# Patient Record
Sex: FEMALE | Race: WHITE | Employment: FULL TIME | URBAN - METROPOLITAN AREA
[De-identification: names, ages, dates, MRNs, and addresses within clinical notes are randomized per-mention and may not be internally consistent; named-entity substitution may affect disease eponyms.]

---

## 2017-04-10 ENCOUNTER — HOSPITAL ENCOUNTER (OUTPATIENT)
Dept: OTHER | Age: 32
Discharge: HOME | End: 2017-04-10
Attending: INTERNAL MEDICINE | Admitting: INTERNAL MEDICINE

## 2017-04-10 LAB
A/G RATIO: 0.8 (ref 1.1–2.2)
ALBUMIN SERPL-MCNC: 3.2 G/DL (ref 3.4–5)
ALP BLD-CCNC: 447 U/L (ref 40–129)
ALT SERPL-CCNC: 35 U/L (ref 10–40)
ANION GAP SERPL CALCULATED.3IONS-SCNC: 13 MMOL/L (ref 3–16)
AST SERPL-CCNC: 61 U/L (ref 15–37)
BASOPHILS ABSOLUTE: 0.1 K/UL (ref 0–0.2)
BASOPHILS RELATIVE PERCENT: 0.7 %
BILIRUB SERPL-MCNC: 0.4 MG/DL (ref 0–1)
BUN BLDV-MCNC: 7 MG/DL (ref 7–20)
CALCIUM SERPL-MCNC: 8.4 MG/DL (ref 8.3–10.6)
CHLORIDE BLD-SCNC: 104 MMOL/L (ref 99–110)
CO2: 21 MMOL/L (ref 21–32)
CREAT SERPL-MCNC: <0.5 MG/DL (ref 0.6–1.1)
EOSINOPHILS ABSOLUTE: 0.2 K/UL (ref 0–0.6)
EOSINOPHILS RELATIVE PERCENT: 1.7 %
GFR AFRICAN AMERICAN: >60
GFR NON-AFRICAN AMERICAN: >60
GLOBULIN: 4 G/DL
GLUCOSE BLD-MCNC: 163 MG/DL (ref 70–99)
HCT VFR BLD CALC: 18.6 % (ref 36–48)
HEMOGLOBIN: 5.4 G/DL (ref 12–16)
LYMPHOCYTES ABSOLUTE: 4.7 K/UL (ref 1–5.1)
LYMPHOCYTES RELATIVE PERCENT: 33.5 %
MCH RBC QN AUTO: 20.9 PG (ref 26–34)
MCHC RBC AUTO-ENTMCNC: 28.9 G/DL (ref 31–36)
MCV RBC AUTO: 72.3 FL (ref 80–100)
MONOCYTES ABSOLUTE: 1.2 K/UL (ref 0–1.3)
MONOCYTES RELATIVE PERCENT: 8.8 %
NEUTROPHILS ABSOLUTE: 7.7 K/UL (ref 1.7–7.7)
NEUTROPHILS RELATIVE PERCENT: 55.3 %
PDW BLD-RTO: 19.9 % (ref 12.4–15.4)
PLATELET # BLD: 528 K/UL (ref 135–450)
PMV BLD AUTO: 8.5 FL (ref 5–10.5)
POTASSIUM SERPL-SCNC: 4.1 MMOL/L (ref 3.5–5.1)
RBC # BLD: 2.57 M/UL (ref 4–5.2)
SODIUM BLD-SCNC: 138 MMOL/L (ref 136–145)
T4 TOTAL: 5.8 UG/DL (ref 4.5–10.9)
TOTAL PROTEIN: 7.2 G/DL (ref 6.4–8.2)
TSH SERPL DL<=0.05 MIU/L-ACNC: 1.91 UIU/ML (ref 0.27–4.2)
WBC # BLD: 14 K/UL (ref 4–11)

## 2017-04-11 ENCOUNTER — HOSPITAL ENCOUNTER (OUTPATIENT)
Dept: ONCOLOGY | Age: 32
Discharge: HOME | End: 2017-04-30
Attending: INTERNAL MEDICINE | Admitting: INTERNAL MEDICINE

## 2017-04-11 VITALS
DIASTOLIC BLOOD PRESSURE: 59 MMHG | SYSTOLIC BLOOD PRESSURE: 98 MMHG | HEART RATE: 77 BPM | TEMPERATURE: 98.6 F | RESPIRATION RATE: 15 BRPM

## 2017-04-11 LAB
ABO/RH: NORMAL
ANTIBODY SCREEN: NORMAL
BLOOD BANK DISPENSE STATUS: NORMAL
BLOOD BANK DISPENSE STATUS: NORMAL
BLOOD BANK PRODUCT CODE: NORMAL
BLOOD BANK PRODUCT CODE: NORMAL
BPU ID: NORMAL
BPU ID: NORMAL
DESCRIPTION BLOOD BANK: NORMAL
DESCRIPTION BLOOD BANK: NORMAL
FERRITIN: 12 NG/ML (ref 15–150)
FOLATE: >20 NG/ML (ref 4.78–24.2)
IRON SATURATION: 5 % (ref 15–50)
IRON: 21 UG/DL (ref 37–145)
TOTAL IRON BINDING CAPACITY: 394 UG/DL (ref 260–445)
VITAMIN B-12: 1035 PG/ML (ref 211–911)

## 2017-04-11 RX ORDER — SODIUM CHLORIDE 0.9 % (FLUSH) 0.9 %
10 SYRINGE (ML) INJECTION PRN
Status: DISCONTINUED | OUTPATIENT
Start: 2017-04-11 | End: 2017-04-30 | Stop reason: HOSPADM

## 2017-04-11 RX ORDER — 0.9 % SODIUM CHLORIDE 0.9 %
250 INTRAVENOUS SOLUTION INTRAVENOUS ONCE
Status: DISCONTINUED | OUTPATIENT
Start: 2017-04-11 | End: 2017-04-30 | Stop reason: HOSPADM

## 2017-04-11 RX ORDER — METRONIDAZOLE 250 MG/1
250 TABLET ORAL SEE ADMIN INSTRUCTIONS
COMMUNITY
End: 2017-10-28

## 2017-04-11 RX ORDER — PROMETHAZINE HYDROCHLORIDE 12.5 MG/1
12.5 TABLET ORAL EVERY 6 HOURS PRN
COMMUNITY
End: 2017-10-28

## 2017-04-11 RX ORDER — INSULIN GLARGINE 100 [IU]/ML
5 INJECTION, SOLUTION SUBCUTANEOUS NIGHTLY
Status: ON HOLD | COMMUNITY
End: 2020-05-30 | Stop reason: ALTCHOICE

## 2017-04-11 RX ORDER — ONDANSETRON 4 MG/1
4 TABLET, FILM COATED ORAL EVERY 6 HOURS PRN
COMMUNITY

## 2017-04-11 RX ORDER — ZONISAMIDE 100 MG/1
100 CAPSULE ORAL NIGHTLY
COMMUNITY
End: 2017-11-03

## 2017-04-11 RX ORDER — POLYETHYLENE GLYCOL 3350 17 G/17G
17 POWDER, FOR SOLUTION ORAL NIGHTLY
Status: ON HOLD | COMMUNITY
End: 2017-11-08

## 2017-04-11 RX ORDER — ERGOCALCIFEROL 1.25 MG/1
50000 CAPSULE ORAL WEEKLY
COMMUNITY
End: 2019-11-04

## 2017-04-11 RX ORDER — LAMOTRIGINE 25 MG/1
75 TABLET ORAL 2 TIMES DAILY
COMMUNITY

## 2017-04-11 RX ORDER — METHADONE HYDROCHLORIDE 10 MG/1
10 TABLET ORAL 2 TIMES DAILY
COMMUNITY

## 2017-04-13 ENCOUNTER — HOSPITAL ENCOUNTER (OUTPATIENT)
Dept: OTHER | Age: 32
Discharge: HOME | End: 2017-04-13
Attending: INTERNAL MEDICINE | Admitting: INTERNAL MEDICINE

## 2017-04-13 LAB
BASOPHILS ABSOLUTE: 0.1 K/UL (ref 0–0.2)
BASOPHILS RELATIVE PERCENT: 0.5 %
EOSINOPHILS ABSOLUTE: 0.2 K/UL (ref 0–0.6)
EOSINOPHILS RELATIVE PERCENT: 1.9 %
HCT VFR BLD CALC: 28.4 % (ref 36–48)
HEMOGLOBIN: 8.3 G/DL (ref 12–16)
LYMPHOCYTES ABSOLUTE: 4.6 K/UL (ref 1–5.1)
LYMPHOCYTES RELATIVE PERCENT: 40.7 %
MCH RBC QN AUTO: 22.9 PG (ref 26–34)
MCHC RBC AUTO-ENTMCNC: 29.3 G/DL (ref 31–36)
MCV RBC AUTO: 78.4 FL (ref 80–100)
MONOCYTES ABSOLUTE: 1.2 K/UL (ref 0–1.3)
MONOCYTES RELATIVE PERCENT: 10.3 %
NEUTROPHILS ABSOLUTE: 5.3 K/UL (ref 1.7–7.7)
NEUTROPHILS RELATIVE PERCENT: 46.6 %
PDW BLD-RTO: 22.5 % (ref 12.4–15.4)
PLATELET # BLD: 538 K/UL (ref 135–450)
PMV BLD AUTO: 8.6 FL (ref 5–10.5)
RBC # BLD: 3.62 M/UL (ref 4–5.2)
WBC # BLD: 11.3 K/UL (ref 4–11)

## 2017-04-26 ENCOUNTER — HOSPITAL ENCOUNTER (OUTPATIENT)
Dept: ENDOSCOPY | Age: 32
Discharge: HOME | End: 2017-04-26
Attending: INTERNAL MEDICINE | Admitting: INTERNAL MEDICINE

## 2017-04-26 LAB
GLUCOSE BLD-MCNC: 126 MG/DL (ref 70–99)
GLUCOSE BLD-MCNC: 92 MG/DL (ref 70–99)
HCG(URINE) PREGNANCY TEST: NEGATIVE
PERFORMED ON: ABNORMAL
PERFORMED ON: NORMAL

## 2017-04-26 RX ORDER — SODIUM CHLORIDE 0.9 % (FLUSH) 0.9 %
10 SYRINGE (ML) INJECTION EVERY 12 HOURS SCHEDULED
Status: DISCONTINUED | OUTPATIENT
Start: 2017-04-26 | End: 2017-04-27 | Stop reason: HOSPADM

## 2017-04-26 RX ORDER — MIDAZOLAM HYDROCHLORIDE 1 MG/ML
2 INJECTION INTRAMUSCULAR; INTRAVENOUS ONCE
Status: COMPLETED | OUTPATIENT
Start: 2017-04-26 | End: 2017-04-26

## 2017-04-26 RX ORDER — SODIUM CHLORIDE 9 MG/ML
INJECTION, SOLUTION INTRAVENOUS CONTINUOUS
Status: DISCONTINUED | OUTPATIENT
Start: 2017-04-26 | End: 2017-04-27 | Stop reason: HOSPADM

## 2017-04-26 RX ORDER — SODIUM CHLORIDE 0.9 % (FLUSH) 0.9 %
10 SYRINGE (ML) INJECTION PRN
Status: DISCONTINUED | OUTPATIENT
Start: 2017-04-26 | End: 2017-04-27 | Stop reason: HOSPADM

## 2017-04-26 RX ADMIN — MIDAZOLAM HYDROCHLORIDE 2 MG: 1 INJECTION INTRAMUSCULAR; INTRAVENOUS at 11:50

## 2017-05-17 ENCOUNTER — OFFICE VISIT (OUTPATIENT)
Dept: CARDIOLOGY CLINIC | Age: 32
End: 2017-05-17

## 2017-05-17 VITALS
BODY MASS INDEX: 18.16 KG/M2 | HEART RATE: 86 BPM | WEIGHT: 113 LBS | DIASTOLIC BLOOD PRESSURE: 64 MMHG | SYSTOLIC BLOOD PRESSURE: 122 MMHG | HEIGHT: 66 IN

## 2017-05-17 DIAGNOSIS — R60.1 GENERALIZED EDEMA: Primary | ICD-10-CM

## 2017-05-17 DIAGNOSIS — R77.0 ABNORMALITY OF ALBUMIN: ICD-10-CM

## 2017-05-17 PROBLEM — Z82.49 FAMILY HISTORY OF PREMATURE CAD: Status: ACTIVE | Noted: 2017-05-17

## 2017-05-17 PROCEDURE — G8419 CALC BMI OUT NRM PARAM NOF/U: HCPCS | Performed by: INTERNAL MEDICINE

## 2017-05-17 PROCEDURE — G8427 DOCREV CUR MEDS BY ELIG CLIN: HCPCS | Performed by: INTERNAL MEDICINE

## 2017-05-17 PROCEDURE — 99203 OFFICE O/P NEW LOW 30 MIN: CPT | Performed by: INTERNAL MEDICINE

## 2017-05-17 PROCEDURE — 1036F TOBACCO NON-USER: CPT | Performed by: INTERNAL MEDICINE

## 2017-05-19 ENCOUNTER — HOSPITAL ENCOUNTER (OUTPATIENT)
Dept: OTHER | Age: 32
Discharge: HOME | End: 2017-05-19
Attending: INTERNAL MEDICINE | Admitting: INTERNAL MEDICINE

## 2017-05-19 DIAGNOSIS — R50.9 FEVER AND CHILLS: ICD-10-CM

## 2017-05-19 LAB
A/G RATIO: 0.8 (ref 1.1–2.2)
ALBUMIN SERPL-MCNC: 3.5 G/DL (ref 3.4–5)
ALP BLD-CCNC: 366 U/L (ref 40–129)
ALT SERPL-CCNC: 41 U/L (ref 10–40)
ANION GAP SERPL CALCULATED.3IONS-SCNC: 10 MMOL/L (ref 3–16)
AST SERPL-CCNC: 74 U/L (ref 15–37)
BASOPHILS ABSOLUTE: 0.1 K/UL (ref 0–0.2)
BASOPHILS RELATIVE PERCENT: 0.5 %
BILIRUB SERPL-MCNC: 0.8 MG/DL (ref 0–1)
BUN BLDV-MCNC: 9 MG/DL (ref 7–20)
CALCIUM SERPL-MCNC: 8.6 MG/DL (ref 8.3–10.6)
CHLORIDE BLD-SCNC: 103 MMOL/L (ref 99–110)
CO2: 24 MMOL/L (ref 21–32)
CREAT SERPL-MCNC: <0.5 MG/DL (ref 0.6–1.1)
EOSINOPHILS ABSOLUTE: 0.1 K/UL (ref 0–0.6)
EOSINOPHILS RELATIVE PERCENT: 0.9 %
GFR AFRICAN AMERICAN: >60
GFR NON-AFRICAN AMERICAN: >60
GLOBULIN: 4.2 G/DL
GLUCOSE BLD-MCNC: 170 MG/DL (ref 70–99)
HCT VFR BLD CALC: 27.5 % (ref 36–48)
HEMOGLOBIN: 8.7 G/DL (ref 12–16)
LYMPHOCYTES ABSOLUTE: 2.6 K/UL (ref 1–5.1)
LYMPHOCYTES RELATIVE PERCENT: 20.1 %
MCH RBC QN AUTO: 25.5 PG (ref 26–34)
MCHC RBC AUTO-ENTMCNC: 31.6 G/DL (ref 31–36)
MCV RBC AUTO: 80.5 FL (ref 80–100)
MONOCYTES ABSOLUTE: 1.1 K/UL (ref 0–1.3)
MONOCYTES RELATIVE PERCENT: 8.1 %
NEUTROPHILS ABSOLUTE: 9.2 K/UL (ref 1.7–7.7)
NEUTROPHILS RELATIVE PERCENT: 70.4 %
PDW BLD-RTO: 25.4 % (ref 12.4–15.4)
PLATELET # BLD: 335 K/UL (ref 135–450)
PMV BLD AUTO: 9.1 FL (ref 5–10.5)
POTASSIUM SERPL-SCNC: 3.7 MMOL/L (ref 3.5–5.1)
RAPID INFLUENZA  B AGN: NEGATIVE
RAPID INFLUENZA A AGN: NEGATIVE
RBC # BLD: 3.42 M/UL (ref 4–5.2)
SODIUM BLD-SCNC: 137 MMOL/L (ref 136–145)
TOTAL PROTEIN: 7.7 G/DL (ref 6.4–8.2)
WBC # BLD: 13 K/UL (ref 4–11)

## 2017-05-30 ENCOUNTER — HOSPITAL ENCOUNTER (OUTPATIENT)
Dept: OTHER | Age: 32
Discharge: HOME | End: 2017-05-30
Attending: INTERNAL MEDICINE | Admitting: INTERNAL MEDICINE

## 2017-05-30 LAB
ANION GAP SERPL CALCULATED.3IONS-SCNC: 12 MMOL/L (ref 3–16)
BASOPHILS ABSOLUTE: 0.1 K/UL (ref 0–0.2)
BASOPHILS RELATIVE PERCENT: 0.5 %
BUN BLDV-MCNC: 8 MG/DL (ref 7–20)
CALCIUM SERPL-MCNC: 8.8 MG/DL (ref 8.3–10.6)
CHLORIDE BLD-SCNC: 103 MMOL/L (ref 99–110)
CO2: 25 MMOL/L (ref 21–32)
CREAT SERPL-MCNC: 0.5 MG/DL (ref 0.6–1.1)
EOSINOPHILS ABSOLUTE: 0.2 K/UL (ref 0–0.6)
EOSINOPHILS RELATIVE PERCENT: 2.2 %
GFR AFRICAN AMERICAN: >60
GFR NON-AFRICAN AMERICAN: >60
GLUCOSE BLD-MCNC: 117 MG/DL (ref 70–99)
HCT VFR BLD CALC: 29.2 % (ref 36–48)
HEMOGLOBIN: 9.2 G/DL (ref 12–16)
LYMPHOCYTES ABSOLUTE: 3.6 K/UL (ref 1–5.1)
LYMPHOCYTES RELATIVE PERCENT: 34.4 %
MCH RBC QN AUTO: 28.1 PG (ref 26–34)
MCHC RBC AUTO-ENTMCNC: 31.5 G/DL (ref 31–36)
MCV RBC AUTO: 89 FL (ref 80–100)
MONOCYTES ABSOLUTE: 0.9 K/UL (ref 0–1.3)
MONOCYTES RELATIVE PERCENT: 8.3 %
NEUTROPHILS ABSOLUTE: 5.7 K/UL (ref 1.7–7.7)
NEUTROPHILS RELATIVE PERCENT: 54.6 %
PDW BLD-RTO: 30.6 % (ref 12.4–15.4)
PLATELET # BLD: 410 K/UL (ref 135–450)
PMV BLD AUTO: 9.3 FL (ref 5–10.5)
POTASSIUM SERPL-SCNC: 4.1 MMOL/L (ref 3.5–5.1)
RBC # BLD: 3.28 M/UL (ref 4–5.2)
SODIUM BLD-SCNC: 140 MMOL/L (ref 136–145)
WBC # BLD: 10.5 K/UL (ref 4–11)

## 2017-06-01 ENCOUNTER — HOSPITAL ENCOUNTER (OUTPATIENT)
Dept: OTHER | Age: 32
Discharge: HOME | End: 2017-06-30
Attending: INTERNAL MEDICINE | Admitting: INTERNAL MEDICINE

## 2017-06-08 ENCOUNTER — HOSPITAL ENCOUNTER (OUTPATIENT)
Dept: VASCULAR LAB | Age: 32
Discharge: HOME | End: 2017-06-08
Attending: INTERNAL MEDICINE | Admitting: INTERNAL MEDICINE

## 2017-06-08 ENCOUNTER — HOSPITAL ENCOUNTER (OUTPATIENT)
Dept: NON INVASIVE DIAGNOSTICS | Age: 32
Discharge: HOME | End: 2017-06-08
Attending: INTERNAL MEDICINE | Admitting: INTERNAL MEDICINE

## 2017-06-08 DIAGNOSIS — R60.1 GENERALIZED EDEMA: ICD-10-CM

## 2017-06-08 DIAGNOSIS — R60.0 LOCALIZED EDEMA: ICD-10-CM

## 2017-06-08 LAB
BASOPHILS ABSOLUTE: 0.1 K/UL (ref 0–0.2)
BASOPHILS RELATIVE PERCENT: 1.3 %
EOSINOPHILS ABSOLUTE: 0.1 K/UL (ref 0–0.6)
EOSINOPHILS RELATIVE PERCENT: 1 %
HCT VFR BLD CALC: 33.6 % (ref 36–48)
HEMOGLOBIN: 10.8 G/DL (ref 12–16)
LV EF: 55 %
LVEF MODALITY: NORMAL
LYMPHOCYTES ABSOLUTE: 3.8 K/UL (ref 1–5.1)
LYMPHOCYTES RELATIVE PERCENT: 37.2 %
MCH RBC QN AUTO: 29.3 PG (ref 26–34)
MCHC RBC AUTO-ENTMCNC: 32.2 G/DL (ref 31–36)
MCV RBC AUTO: 90.9 FL (ref 80–100)
MONOCYTES ABSOLUTE: 1 K/UL (ref 0–1.3)
MONOCYTES RELATIVE PERCENT: 9.4 %
NEUTROPHILS ABSOLUTE: 5.2 K/UL (ref 1.7–7.7)
NEUTROPHILS RELATIVE PERCENT: 51.1 %
PDW BLD-RTO: 28.4 % (ref 12.4–15.4)
PLATELET # BLD: 361 K/UL (ref 135–450)
PMV BLD AUTO: 9.8 FL (ref 5–10.5)
RBC # BLD: 3.7 M/UL (ref 4–5.2)
WBC # BLD: 10.2 K/UL (ref 4–11)

## 2017-08-26 ENCOUNTER — HOSPITAL ENCOUNTER (OUTPATIENT)
Dept: OTHER | Age: 32
Discharge: HOME | End: 2017-08-26
Attending: INTERNAL MEDICINE | Admitting: INTERNAL MEDICINE

## 2017-08-26 LAB
A/G RATIO: 0.9 (ref 1.1–2.2)
ALBUMIN SERPL-MCNC: 3.9 G/DL (ref 3.4–5)
ALP BLD-CCNC: 291 U/L (ref 40–129)
ALT SERPL-CCNC: 62 U/L (ref 10–40)
ANION GAP SERPL CALCULATED.3IONS-SCNC: 14 MMOL/L (ref 3–16)
AST SERPL-CCNC: 107 U/L (ref 15–37)
BILIRUB SERPL-MCNC: 0.9 MG/DL (ref 0–1)
BUN BLDV-MCNC: 8 MG/DL (ref 7–20)
CALCIUM SERPL-MCNC: 9.6 MG/DL (ref 8.3–10.6)
CHLORIDE BLD-SCNC: 99 MMOL/L (ref 99–110)
CHOLESTEROL, TOTAL: 98 MG/DL (ref 0–199)
CO2: 25 MMOL/L (ref 21–32)
CREAT SERPL-MCNC: <0.5 MG/DL (ref 0.6–1.1)
FERRITIN: 441.1 NG/ML (ref 15–150)
GFR AFRICAN AMERICAN: >60
GFR NON-AFRICAN AMERICAN: >60
GLOBULIN: 4.2 G/DL
GLUCOSE BLD-MCNC: 120 MG/DL (ref 70–99)
HDLC SERPL-MCNC: 33 MG/DL (ref 40–60)
IRON SATURATION: 53 % (ref 15–50)
IRON: 134 UG/DL (ref 37–145)
LDL CHOLESTEROL CALCULATED: 53 MG/DL
POTASSIUM SERPL-SCNC: 4.4 MMOL/L (ref 3.5–5.1)
SODIUM BLD-SCNC: 138 MMOL/L (ref 136–145)
TOTAL IRON BINDING CAPACITY: 251 UG/DL (ref 260–445)
TOTAL PROTEIN: 8.1 G/DL (ref 6.4–8.2)
TRIGL SERPL-MCNC: 62 MG/DL (ref 0–150)
TSH SERPL DL<=0.05 MIU/L-ACNC: 2.17 UIU/ML (ref 0.27–4.2)
VLDLC SERPL CALC-MCNC: 12 MG/DL

## 2017-10-29 PROBLEM — N20.1 URETEROLITHIASIS: Status: ACTIVE | Noted: 2017-10-29

## 2017-10-29 PROBLEM — R10.12 ABDOMINAL PAIN, LEFT UPPER QUADRANT: Status: ACTIVE | Noted: 2017-10-29

## 2017-10-29 PROBLEM — R74.01 TRANSAMINITIS: Status: ACTIVE | Noted: 2017-10-29

## 2017-10-29 PROBLEM — N39.0 UTI (URINARY TRACT INFECTION): Status: ACTIVE | Noted: 2017-10-29

## 2017-10-29 PROBLEM — E10.9 CONTROLLED DIABETES MELLITUS TYPE 1 WITHOUT COMPLICATIONS (HCC): Status: ACTIVE | Noted: 2017-10-29

## 2017-11-03 PROBLEM — R09.02 HYPOXEMIA: Status: ACTIVE | Noted: 2017-11-03

## 2018-01-12 ENCOUNTER — HOSPITAL ENCOUNTER (OUTPATIENT)
Dept: OTHER | Age: 33
Discharge: HOME | End: 2018-01-12
Attending: INTERNAL MEDICINE | Admitting: INTERNAL MEDICINE

## 2018-01-12 LAB
FERRITIN: 86.8 NG/ML (ref 15–150)
HEPATITIS B SURFACE ANTIGEN INTERPRETATION: NORMAL
HEPATITIS C ANTIBODY INTERPRETATION: NORMAL
IRON SATURATION: 50 % (ref 15–50)
IRON: 135 UG/DL (ref 37–145)
TOTAL IRON BINDING CAPACITY: 271 UG/DL (ref 260–445)

## 2018-01-14 LAB
F-ACTIN AB IGG: 7 UNITS (ref 0–19)
MITOCHONDRIAL M2 AB, IGG: 6.3 UNITS (ref 0–20)

## 2018-02-09 ENCOUNTER — HOSPITAL ENCOUNTER (OUTPATIENT)
Dept: CT IMAGING | Age: 33
Discharge: HOME | End: 2018-02-09
Admitting: INTERNAL MEDICINE

## 2018-02-09 DIAGNOSIS — K76.9 LIVER LESION: ICD-10-CM

## 2018-02-09 DIAGNOSIS — K76.89 OTHER SPECIFIED DISEASES OF LIVER (CODE): ICD-10-CM

## 2018-02-09 LAB
BUN BLDV-MCNC: 6 MG/DL (ref 7–20)
CREAT SERPL-MCNC: <0.5 MG/DL (ref 0.6–1.1)
GFR AFRICAN AMERICAN: >60
GFR NON-AFRICAN AMERICAN: >60

## 2018-02-27 ENCOUNTER — OFFICE VISIT (OUTPATIENT)
Dept: SURGERY | Age: 33
End: 2018-02-27

## 2018-02-27 VITALS
WEIGHT: 128 LBS | SYSTOLIC BLOOD PRESSURE: 109 MMHG | DIASTOLIC BLOOD PRESSURE: 70 MMHG | HEIGHT: 66 IN | BODY MASS INDEX: 20.57 KG/M2

## 2018-02-27 DIAGNOSIS — K85.90 RECURRENT ACUTE PANCREATITIS: ICD-10-CM

## 2018-02-27 DIAGNOSIS — I87.8 POOR VENOUS ACCESS: Primary | ICD-10-CM

## 2018-02-27 PROCEDURE — 99203 OFFICE O/P NEW LOW 30 MIN: CPT | Performed by: SURGERY

## 2018-02-27 PROCEDURE — G8420 CALC BMI NORM PARAMETERS: HCPCS | Performed by: SURGERY

## 2018-02-27 PROCEDURE — G8482 FLU IMMUNIZE ORDER/ADMIN: HCPCS | Performed by: SURGERY

## 2018-02-27 PROCEDURE — G8428 CUR MEDS NOT DOCUMENT: HCPCS | Performed by: SURGERY

## 2018-02-27 RX ORDER — PROMETHAZINE HYDROCHLORIDE 25 MG/1
12.5 TABLET ORAL EVERY 6 HOURS PRN
COMMUNITY

## 2018-02-27 ASSESSMENT — ENCOUNTER SYMPTOMS
EYES NEGATIVE: 1
ALLERGIC/IMMUNOLOGIC NEGATIVE: 1
VOMITING: 1
CONSTIPATION: 1
CHEST TIGHTNESS: 1

## 2018-02-27 NOTE — PROGRESS NOTES
[facility] GENERAL AND LAPAROSCOPIC SURGERY                       PATIENT NAME: Thony Vidal DATE: 2/27/2018    Reason for Consult:  Poor venous access    Requesting Physician:  Dr. Varghese Backbone:              The patient is a 28 y.o. female who presents with poor venous access. Has recurrent abd pain, had recurrent acute pancreatitis, had pancreatic resection, still has pain, N/V, poor weight, malnutrition. No F/C. Had prior port X 2, had infection in one, and leak of a second. Needs IV access for med tx plan. Past Medical History:        Diagnosis Date    Abdominal pain     with nausea and weight loss    Carbapenem-resistant bacterial infection 11/08/2017    urine    Chronic pancreatitis ([address]     Diabetes mellitus ([address] 2006    since pancreas removed    Digestive disorder     Gastroparesis     History of blood transfusion 04/11/2017    Kidney stone     Kidney stone 11/08/2017    PICC (peripherally inserted central catheter) in place     L arm 6/10       Past Surgical History:        Procedure Laterality Date    CHOLECYSTECTOMY      CYSTOSCOPY  10/31/2017    CYSTOSCOPY, LEFT URETERAL STENT PLACEMENT, LEFT URETEROSCOPY, LEFT RETROGRADE PYELOGRAM    CYSTOSCOPY  11/09/2017    CYSTOSCOPY, LEFT URETEROSCOPY, LEFT RETROGRADE, STENT REMOVAL    HERNIA REPAIR      PANCREAS SURGERY  8/06    removal with islet cell transplant    SMALL INTESTINE SURGERY      SPLENECTOMY      8/06    STOMACH SURGERY  12/06    half of stomach removed    UPPER GASTROINTESTINAL ENDOSCOPY  11/30/2010    nasojejunostomy tube placement       Current Medications:   No current facility-administered medications for this visit. Prior to Admission medications    Medication Sig Start Date End Date Taking?  Authorizing Provider   promethazine (PHENERGAN) 25 MG tablet Take by mouth   Yes Historical Provider, MD   polyethylene glycol (GLYCOLAX) packet Take 102 g by mouth daily as needed for Constipation Pt normally takes 6 bags of 17 g every night. Yes Historical Provider, MD   tamsulosin (FLOMAX) 0.4 MG capsule Take 0.4 mg by mouth daily   Yes Historical Provider, MD   insulin glargine (LANTUS) 100 UNIT/ML injection vial Inject 7 Units into the skin nightly    Yes Historical Provider, MD   lamoTRIgine (LAMICTAL) 25 MG tablet Take 75 mg by mouth 2 times daily    Yes Historical Provider, MD   methadone (DOLOPHINE) 10 MG tablet Take 10 mg by mouth 2 times daily . Yes Historical Provider, MD   Pancrelipase, Lip-Prot-Amyl, (CREON) 47347 UNITS CPEP Take 1 tablet by mouth 3 times daily Takes 1 tablets with each meal   Yes Historical Provider, MD   ondansetron (ZOFRAN) 4 MG tablet Take 4 mg by mouth every 6 hours as needed for Nausea or Vomiting   Yes Historical Provider, MD   vitamin D (ERGOCALCIFEROL) 91245 UNITS CAPS capsule Take 50,000 Units by mouth once a week   Yes Historical Provider, MD   insulin aspart (NOVOLOG) 100 UNIT/ML injection Inject  into the skin. 1 unit for every 50 of sugar above 150 qac 10/25/10  Yes Florence Contreras MD        Allergies:  Adhesive tape    Social History:    reports that she has never smoked. She has never used smokeless tobacco. She reports that she does not drink alcohol or use drugs. Family History:        Problem Relation Age of Onset    Heart Disease Maternal Grandfather     High Blood Pressure Maternal Grandmother     Cancer Neg Hx     Diabetes Neg Hx     Stroke Neg Hx     Osteoporosis Neg Hx     Thyroid Disease Neg Hx        REVIEW OF SYSTEMS:  Review of Systems   Constitutional: Positive for fatigue. HENT: Positive for dental problem. Eyes: Negative. Respiratory: Positive for chest tightness. Cardiovascular: Positive for leg swelling. Gastrointestinal: Positive for constipation and vomiting. Endocrine: Negative. Genitourinary: Negative. Musculoskeletal: Negative. Skin: Negative. Allergic/Immunologic: Negative.

## 2018-03-09 ENCOUNTER — HOSPITAL ENCOUNTER (OUTPATIENT)
Dept: SURGERY | Age: 33
Discharge: HOME | End: 2018-03-09
Attending: SURGERY | Admitting: SURGERY

## 2018-03-09 VITALS
BODY MASS INDEX: 19.84 KG/M2 | SYSTOLIC BLOOD PRESSURE: 110 MMHG | OXYGEN SATURATION: 93 % | HEIGHT: 66 IN | TEMPERATURE: 97 F | HEART RATE: 74 BPM | WEIGHT: 123.46 LBS | DIASTOLIC BLOOD PRESSURE: 74 MMHG | RESPIRATION RATE: 20 BRPM

## 2018-03-09 DIAGNOSIS — Z95.828 PORTACATH IN PLACE: ICD-10-CM

## 2018-03-09 DIAGNOSIS — G89.18 PAIN AT SURGICAL SITE: Primary | ICD-10-CM

## 2018-03-09 LAB
ANION GAP SERPL CALCULATED.3IONS-SCNC: 10 MMOL/L (ref 3–16)
BUN BLDV-MCNC: 6 MG/DL (ref 7–20)
CALCIUM SERPL-MCNC: 9 MG/DL (ref 8.3–10.6)
CHLORIDE BLD-SCNC: 99 MMOL/L (ref 99–110)
CO2: 30 MMOL/L (ref 21–32)
CREAT SERPL-MCNC: 0.6 MG/DL (ref 0.6–1.1)
GFR AFRICAN AMERICAN: >60
GFR NON-AFRICAN AMERICAN: >60
GLUCOSE BLD-MCNC: 160 MG/DL (ref 70–99)
GLUCOSE BLD-MCNC: 169 MG/DL (ref 70–99)
HCG(URINE) PREGNANCY TEST: NEGATIVE
HCT VFR BLD CALC: 34 % (ref 36–48)
HEMOGLOBIN: 11.3 G/DL (ref 12–16)
MCH RBC QN AUTO: 31.5 PG (ref 26–34)
MCHC RBC AUTO-ENTMCNC: 33.3 G/DL (ref 31–36)
MCV RBC AUTO: 94.6 FL (ref 80–100)
PDW BLD-RTO: 14.1 % (ref 12.4–15.4)
PERFORMED ON: ABNORMAL
PLATELET # BLD: 285 K/UL (ref 135–450)
PMV BLD AUTO: 8.9 FL (ref 5–10.5)
POTASSIUM SERPL-SCNC: 3.9 MMOL/L (ref 3.5–5.1)
RBC # BLD: 3.59 M/UL (ref 4–5.2)
SODIUM BLD-SCNC: 139 MMOL/L (ref 136–145)
WBC # BLD: 12.3 K/UL (ref 4–11)

## 2018-03-09 PROCEDURE — 36561 INSERT TUNNELED CV CATH: CPT | Performed by: SURGERY

## 2018-03-09 PROCEDURE — 77001 FLUOROGUIDE FOR VEIN DEVICE: CPT | Performed by: SURGERY

## 2018-03-09 RX ORDER — SODIUM CHLORIDE 9 MG/ML
INJECTION, SOLUTION INTRAVENOUS CONTINUOUS
Status: DISCONTINUED | OUTPATIENT
Start: 2018-03-09 | End: 2018-03-10 | Stop reason: HOSPADM

## 2018-03-09 RX ORDER — LIDOCAINE 50 MG/G
1 PATCH TOPICAL ONCE
Status: DISCONTINUED | OUTPATIENT
Start: 2018-03-09 | End: 2018-03-10 | Stop reason: HOSPADM

## 2018-03-09 RX ORDER — LABETALOL HYDROCHLORIDE 5 MG/ML
5 INJECTION, SOLUTION INTRAVENOUS EVERY 10 MIN PRN
Status: DISCONTINUED | OUTPATIENT
Start: 2018-03-09 | End: 2018-03-10 | Stop reason: HOSPADM

## 2018-03-09 RX ORDER — HYDROMORPHONE HCL 110MG/55ML
1 PATIENT CONTROLLED ANALGESIA SYRINGE INTRAVENOUS ONCE
Status: COMPLETED | OUTPATIENT
Start: 2018-03-09 | End: 2018-03-09

## 2018-03-09 RX ORDER — ACETAMINOPHEN 325 MG/1
TABLET ORAL
Status: COMPLETED
Start: 2018-03-09 | End: 2018-03-09

## 2018-03-09 RX ORDER — DIPHENHYDRAMINE HYDROCHLORIDE 50 MG/ML
12.5 INJECTION INTRAMUSCULAR; INTRAVENOUS
Status: ACTIVE | OUTPATIENT
Start: 2018-03-09 | End: 2018-03-09

## 2018-03-09 RX ORDER — HYDROMORPHONE HCL 110MG/55ML
0.5 PATIENT CONTROLLED ANALGESIA SYRINGE INTRAVENOUS EVERY 5 MIN PRN
Status: DISCONTINUED | OUTPATIENT
Start: 2018-03-09 | End: 2018-03-10 | Stop reason: HOSPADM

## 2018-03-09 RX ORDER — KETOROLAC TROMETHAMINE 30 MG/ML
INJECTION, SOLUTION INTRAMUSCULAR; INTRAVENOUS
Status: COMPLETED
Start: 2018-03-09 | End: 2018-03-09

## 2018-03-09 RX ORDER — ONDANSETRON 2 MG/ML
4 INJECTION INTRAMUSCULAR; INTRAVENOUS
Status: ACTIVE | OUTPATIENT
Start: 2018-03-09 | End: 2018-03-09

## 2018-03-09 RX ORDER — HYDROCODONE BITARTRATE AND ACETAMINOPHEN 5; 325 MG/1; MG/1
1 TABLET ORAL ONCE
Status: COMPLETED | OUTPATIENT
Start: 2018-03-09 | End: 2018-03-09

## 2018-03-09 RX ORDER — HYDROCODONE BITARTRATE AND ACETAMINOPHEN 5; 325 MG/1; MG/1
1 TABLET ORAL EVERY 4 HOURS PRN
Qty: 20 TABLET | Refills: 0 | Status: SHIPPED | OUTPATIENT
Start: 2018-03-09 | End: 2018-03-16

## 2018-03-09 RX ORDER — OXYCODONE HYDROCHLORIDE AND ACETAMINOPHEN 5; 325 MG/1; MG/1
2 TABLET ORAL PRN
Status: ACTIVE | OUTPATIENT
Start: 2018-03-09 | End: 2018-03-09

## 2018-03-09 RX ORDER — FENTANYL CITRATE 50 UG/ML
25 INJECTION, SOLUTION INTRAMUSCULAR; INTRAVENOUS EVERY 5 MIN PRN
Status: DISCONTINUED | OUTPATIENT
Start: 2018-03-09 | End: 2018-03-10 | Stop reason: HOSPADM

## 2018-03-09 RX ORDER — ACETAMINOPHEN 325 MG/1
325 TABLET ORAL ONCE
Status: COMPLETED | OUTPATIENT
Start: 2018-03-09 | End: 2018-03-09

## 2018-03-09 RX ORDER — HYDROCODONE BITARTRATE AND ACETAMINOPHEN 5; 325 MG/1; MG/1
TABLET ORAL
Status: DISCONTINUED
Start: 2018-03-09 | End: 2018-03-10 | Stop reason: HOSPADM

## 2018-03-09 RX ORDER — CEFAZOLIN SODIUM 2 G/100ML
2 INJECTION, SOLUTION INTRAVENOUS
Status: COMPLETED | OUTPATIENT
Start: 2018-03-09 | End: 2018-03-09

## 2018-03-09 RX ORDER — MEPERIDINE HYDROCHLORIDE 25 MG/ML
12.5 INJECTION INTRAMUSCULAR; INTRAVENOUS; SUBCUTANEOUS EVERY 5 MIN PRN
Status: DISCONTINUED | OUTPATIENT
Start: 2018-03-09 | End: 2018-03-10 | Stop reason: HOSPADM

## 2018-03-09 RX ORDER — SODIUM CHLORIDE 9 MG/ML
INJECTION, SOLUTION INTRAVENOUS
Status: COMPLETED
Start: 2018-03-09 | End: 2018-03-09

## 2018-03-09 RX ORDER — KETOROLAC TROMETHAMINE 30 MG/ML
30 INJECTION, SOLUTION INTRAMUSCULAR; INTRAVENOUS ONCE
Status: COMPLETED | OUTPATIENT
Start: 2018-03-09 | End: 2018-03-09

## 2018-03-09 RX ORDER — OXYCODONE HYDROCHLORIDE AND ACETAMINOPHEN 5; 325 MG/1; MG/1
1 TABLET ORAL PRN
Status: ACTIVE | OUTPATIENT
Start: 2018-03-09 | End: 2018-03-09

## 2018-03-09 RX ADMIN — ACETAMINOPHEN 325 MG: 325 TABLET ORAL at 13:53

## 2018-03-09 RX ADMIN — CEFAZOLIN SODIUM 2 G: 2 INJECTION, SOLUTION INTRAVENOUS at 11:23

## 2018-03-09 RX ADMIN — KETOROLAC TROMETHAMINE 30 MG: 30 INJECTION, SOLUTION INTRAMUSCULAR; INTRAVENOUS at 13:31

## 2018-03-09 RX ADMIN — SODIUM CHLORIDE: 9 INJECTION, SOLUTION INTRAVENOUS at 10:30

## 2018-03-09 RX ADMIN — Medication 1 MG: at 14:28

## 2018-03-09 RX ADMIN — HYDROCODONE BITARTRATE AND ACETAMINOPHEN 1 TABLET: 5; 325 TABLET ORAL at 13:52

## 2018-03-09 ASSESSMENT — PAIN - FUNCTIONAL ASSESSMENT: PAIN_FUNCTIONAL_ASSESSMENT: 0-10

## 2018-03-09 ASSESSMENT — PAIN SCALES - GENERAL
PAINLEVEL_OUTOF10: 6

## 2018-03-09 NOTE — PROGRESS NOTES
cxr resulted, vss, pt resting comfortably, incision intact- ice pack in place, weaned to RA- O2 saturations stable. Pain controlled. Plan for pain pill after getting some food. Phase 1 discharge criteria met.

## 2018-03-09 NOTE — PROGRESS NOTES
Pt arrived to me from PACU to a same day surgery bay for phase 2 recovery monitoring and care prior to d/c in god condition. Pt is alert and oriented X4. Report received from [facility], phase 1 recovery nurse.       Robert Ann RN, BSN, [facility]  Pre-Op/Recovery   Same Day Surgery

## 2018-03-09 NOTE — BRIEF OP NOTE
Brief Postoperative Note    Luana Samuels  YOB: 1985  [id number]    Pre-operative Diagnosis: Poor venous access    Post-operative Diagnosis: Same    Procedure: Power port    Anesthesia: General and Local    Surgeons/Assistants: Joaquim    Estimated Blood Loss: less than 50     Complications: None    Specimens: Was Not Obtained    Findings: Left access site - 8 Fr power port    Electronically signed by Becca Meredith MD on 3/9/2018 at 12:28 PM

## 2018-03-09 NOTE — PROGRESS NOTES
Having significant pain; crying. Dr. Mercedez Self to the bedside. He has spoken with Dr. Quin Lowe who said the insertion was routine. Dr. Mercedez Self ordered 1mg IV dilaudid. Given.

## 2018-03-09 NOTE — ANESTHESIA PRE-OP
Department of Anesthesiology  Preprocedure Note       Name:  Tatiana Boxer   Age:  28 y.o.  :  1985                                          MRN:  [id number]         Date:  3/9/2018      Surgeon:    Procedure:    Medications prior to admission:   Prior to Admission medications    Medication Sig Start Date End Date Taking? Authorizing Provider   promethazine (PHENERGAN) 25 MG tablet Take 12.5 mg by mouth every 6 hours as needed    Yes Historical Provider, MD   polyethylene glycol (GLYCOLAX) packet Take 102 g by mouth daily Pt normally takes 6 bags of 17 g every night. Yes Historical Provider, MD   insulin glargine (LANTUS) 100 UNIT/ML injection vial Inject 6 Units into the skin nightly    Yes Historical Provider, MD   lamoTRIgine (LAMICTAL) 25 MG tablet Take 75 mg by mouth 2 times daily    Yes Historical Provider, MD   methadone (DOLOPHINE) 10 MG tablet Take 10 mg by mouth 2 times daily . Yes Historical Provider, MD   Pancrelipase, Lip-Prot-Amyl, (CREON) 84345 UNITS CPEP Take 1 tablet by mouth 3 times daily Takes 1 tablets with each meal   Yes Historical Provider, MD   ondansetron (ZOFRAN) 4 MG tablet Take 4 mg by mouth every 6 hours as needed for Nausea or Vomiting   Yes Historical Provider, MD   vitamin D (ERGOCALCIFEROL) 31364 UNITS CAPS capsule Take 50,000 Units by mouth once a week    Historical Provider, MD   insulin aspart (NOVOLOG) 100 UNIT/ML injection Inject  into the skin. 1 unit for every 50 of sugar above 150 qac 10/25/10   Sohan Flor MD       Current medications:    Current Outpatient Prescriptions   Medication Sig Dispense Refill    promethazine (PHENERGAN) 25 MG tablet Take 12.5 mg by mouth every 6 hours as needed       polyethylene glycol (GLYCOLAX) packet Take 102 g by mouth daily Pt normally takes 6 bags of 17 g every night.        insulin glargine (LANTUS) 100 UNIT/ML injection vial Inject 6 Units into the skin nightly       lamoTRIgine (LAMICTAL) 25 MG tablet Take 75 mg by (+) DiabetesType I DM, , .                 Abdominal:           Vascular:                                        Anesthesia Plan      general     ASA 3     (Chronic pancreatitis  On DOLOPHINE)  Induction: intravenous. MIPS: Postoperative opioids intended and Prophylactic antiemetics administered. Anesthetic plan and risks discussed with patient. Plan discussed with CRNA.                 Royce Sweet MD   3/9/2018

## 2018-03-16 ENCOUNTER — HOSPITAL ENCOUNTER (OUTPATIENT)
Dept: ONCOLOGY | Age: 33
Discharge: HOME | End: 2018-03-31
Attending: INTERNAL MEDICINE | Admitting: INTERNAL MEDICINE

## 2018-03-16 VITALS — SYSTOLIC BLOOD PRESSURE: 104 MMHG | TEMPERATURE: 98.7 F | DIASTOLIC BLOOD PRESSURE: 61 MMHG | HEART RATE: 83 BPM

## 2018-03-16 DIAGNOSIS — R11.0 NAUSEA: ICD-10-CM

## 2018-03-16 NOTE — PROGRESS NOTES
To clinic for port accessed, tolerated well. TRC next Friday to have port needle and dressing changed.

## 2018-03-30 ENCOUNTER — HOSPITAL ENCOUNTER (OUTPATIENT)
Dept: ONCOLOGY | Age: 33
Discharge: HOME | End: 2018-03-31
Admitting: INTERNAL MEDICINE

## 2018-03-30 DIAGNOSIS — R11.0 NAUSEA: ICD-10-CM

## 2018-03-30 RX ORDER — SODIUM CHLORIDE 0.9 % (FLUSH) 0.9 %
SYRINGE (ML) INJECTION
Status: DISPENSED
Start: 2018-03-30 | End: 2018-03-30

## 2018-04-01 ENCOUNTER — HOSPITAL ENCOUNTER (OUTPATIENT)
Dept: ONCOLOGY | Age: 33
Discharge: HOME | End: 2018-04-30
Attending: INTERNAL MEDICINE | Admitting: INTERNAL MEDICINE

## 2018-04-04 PROBLEM — R11.0 NAUSEA: Status: ACTIVE | Noted: 2018-04-04

## 2018-04-06 ENCOUNTER — HOSPITAL ENCOUNTER (OUTPATIENT)
Dept: ONCOLOGY | Age: 33
Discharge: HOME | End: 2018-04-07
Admitting: INTERNAL MEDICINE

## 2018-04-06 DIAGNOSIS — R11.0 NAUSEA: ICD-10-CM

## 2018-04-06 RX ORDER — SODIUM CHLORIDE 0.9 % (FLUSH) 0.9 %
20 SYRINGE (ML) INJECTION PRN
Status: DISPENSED | OUTPATIENT
Start: 2018-04-06 | End: 2018-04-07

## 2018-04-06 RX ORDER — SODIUM CHLORIDE 0.9 % (FLUSH) 0.9 %
20 SYRINGE (ML) INJECTION PRN
Status: CANCELLED | OUTPATIENT
Start: 2018-04-06

## 2018-04-12 PROBLEM — N39.0 UTI (URINARY TRACT INFECTION): Status: RESOLVED | Noted: 2017-10-29 | Resolved: 2018-04-12

## 2018-04-13 ENCOUNTER — HOSPITAL ENCOUNTER (OUTPATIENT)
Dept: ONCOLOGY | Age: 33
Discharge: HOME | End: 2018-04-14
Admitting: INTERNAL MEDICINE

## 2018-04-13 VITALS — HEART RATE: 83 BPM | TEMPERATURE: 98.6 F | SYSTOLIC BLOOD PRESSURE: 111 MMHG | DIASTOLIC BLOOD PRESSURE: 77 MMHG

## 2018-04-13 DIAGNOSIS — R11.0 NAUSEA: ICD-10-CM

## 2018-04-13 RX ORDER — SODIUM CHLORIDE 0.9 % (FLUSH) 0.9 %
20 SYRINGE (ML) INJECTION PRN
Status: ACTIVE | OUTPATIENT
Start: 2018-04-13 | End: 2018-04-14

## 2018-04-13 RX ORDER — SODIUM CHLORIDE 0.9 % (FLUSH) 0.9 %
20 SYRINGE (ML) INJECTION PRN
Status: CANCELLED | OUTPATIENT
Start: 2018-04-13

## 2018-04-13 RX ORDER — SODIUM CHLORIDE 0.9 % (FLUSH) 0.9 %
SYRINGE (ML) INJECTION
Status: DISPENSED
Start: 2018-04-13 | End: 2018-04-13

## 2018-04-20 ENCOUNTER — HOSPITAL ENCOUNTER (OUTPATIENT)
Dept: ONCOLOGY | Age: 33
Discharge: HOME | End: 2018-04-21
Admitting: INTERNAL MEDICINE

## 2018-04-20 VITALS — SYSTOLIC BLOOD PRESSURE: 111 MMHG | DIASTOLIC BLOOD PRESSURE: 72 MMHG | HEART RATE: 81 BPM

## 2018-04-20 DIAGNOSIS — R11.0 NAUSEA: ICD-10-CM

## 2018-04-20 RX ORDER — SODIUM CHLORIDE 0.9 % (FLUSH) 0.9 %
20 SYRINGE (ML) INJECTION PRN
Status: CANCELLED | OUTPATIENT
Start: 2018-04-20

## 2018-04-20 RX ORDER — SODIUM CHLORIDE 0.9 % (FLUSH) 0.9 %
20 SYRINGE (ML) INJECTION PRN
Status: DISPENSED | OUTPATIENT
Start: 2018-04-20 | End: 2018-04-21

## 2018-04-30 ENCOUNTER — HOSPITAL ENCOUNTER (OUTPATIENT)
Dept: ONCOLOGY | Age: 33
Discharge: HOME | End: 2018-05-01
Admitting: INTERNAL MEDICINE

## 2018-04-30 VITALS — SYSTOLIC BLOOD PRESSURE: 114 MMHG | DIASTOLIC BLOOD PRESSURE: 75 MMHG | HEART RATE: 88 BPM

## 2018-04-30 DIAGNOSIS — R11.0 NAUSEA: ICD-10-CM

## 2018-04-30 RX ORDER — SODIUM CHLORIDE 0.9 % (FLUSH) 0.9 %
20 SYRINGE (ML) INJECTION PRN
Status: CANCELLED | OUTPATIENT
Start: 2018-04-30

## 2018-04-30 RX ORDER — SODIUM CHLORIDE 0.9 % (FLUSH) 0.9 %
20 SYRINGE (ML) INJECTION PRN
Status: DISPENSED | OUTPATIENT
Start: 2018-04-30 | End: 2018-05-01

## 2018-05-01 ENCOUNTER — HOSPITAL ENCOUNTER (OUTPATIENT)
Dept: ONCOLOGY | Age: 33
Discharge: HOME | End: 2018-05-31
Attending: INTERNAL MEDICINE | Admitting: INTERNAL MEDICINE

## 2018-05-04 ENCOUNTER — HOSPITAL ENCOUNTER (OUTPATIENT)
Dept: ONCOLOGY | Age: 33
Discharge: HOME | End: 2018-05-05
Admitting: INTERNAL MEDICINE

## 2018-05-04 DIAGNOSIS — R11.0 NAUSEA: ICD-10-CM

## 2018-05-04 RX ORDER — SODIUM CHLORIDE 0.9 % (FLUSH) 0.9 %
SYRINGE (ML) INJECTION
Status: DISPENSED
Start: 2018-05-04 | End: 2018-05-04

## 2018-05-11 ENCOUNTER — HOSPITAL ENCOUNTER (OUTPATIENT)
Dept: ONCOLOGY | Age: 33
Discharge: HOME | End: 2018-05-12
Admitting: INTERNAL MEDICINE

## 2018-05-11 DIAGNOSIS — R11.0 NAUSEA: ICD-10-CM

## 2018-05-11 RX ORDER — SODIUM CHLORIDE 0.9 % (FLUSH) 0.9 %
20 SYRINGE (ML) INJECTION PRN
Status: DISPENSED | OUTPATIENT
Start: 2018-05-11 | End: 2018-05-12

## 2018-05-11 RX ORDER — SODIUM CHLORIDE 0.9 % (FLUSH) 0.9 %
20 SYRINGE (ML) INJECTION PRN
Status: CANCELLED | OUTPATIENT
Start: 2018-05-11

## 2018-05-18 ENCOUNTER — HOSPITAL ENCOUNTER (OUTPATIENT)
Dept: ONCOLOGY | Age: 33
Discharge: HOME | End: 2018-05-19
Admitting: INTERNAL MEDICINE

## 2018-05-18 DIAGNOSIS — R11.0 NAUSEA: ICD-10-CM

## 2018-05-18 RX ORDER — SODIUM CHLORIDE 0.9 % (FLUSH) 0.9 %
20 SYRINGE (ML) INJECTION PRN
Status: CANCELLED | OUTPATIENT
Start: 2018-05-18

## 2018-05-18 RX ORDER — SODIUM CHLORIDE 0.9 % (FLUSH) 0.9 %
SYRINGE (ML) INJECTION
Status: DISPENSED
Start: 2018-05-18 | End: 2018-05-18

## 2018-05-18 RX ORDER — SODIUM CHLORIDE 0.9 % (FLUSH) 0.9 %
20 SYRINGE (ML) INJECTION PRN
Status: ACTIVE | OUTPATIENT
Start: 2018-05-18 | End: 2018-05-19

## 2018-05-29 ENCOUNTER — HOSPITAL ENCOUNTER (OUTPATIENT)
Dept: OTHER | Age: 33
Discharge: HOME | End: 2018-05-31
Attending: INTERNAL MEDICINE | Admitting: INTERNAL MEDICINE

## 2018-05-29 LAB
A/G RATIO: 1 (ref 1.1–2.2)
ALBUMIN SERPL-MCNC: 3.9 G/DL (ref 3.4–5)
ALP BLD-CCNC: 242 U/L (ref 40–129)
ALT SERPL-CCNC: 28 U/L (ref 10–40)
ANION GAP SERPL CALCULATED.3IONS-SCNC: 14 MMOL/L (ref 3–16)
AST SERPL-CCNC: 41 U/L (ref 15–37)
BASOPHILS ABSOLUTE: 0 K/UL (ref 0–0.2)
BASOPHILS RELATIVE PERCENT: 0 %
BILIRUB SERPL-MCNC: 0.4 MG/DL (ref 0–1)
BUN BLDV-MCNC: 7 MG/DL (ref 7–20)
CALCIUM SERPL-MCNC: 9 MG/DL (ref 8.3–10.6)
CHLORIDE BLD-SCNC: 95 MMOL/L (ref 99–110)
CO2: 28 MMOL/L (ref 21–32)
CREAT SERPL-MCNC: 0.8 MG/DL (ref 0.6–1.1)
EOSINOPHILS ABSOLUTE: 0.4 K/UL (ref 0–0.6)
EOSINOPHILS RELATIVE PERCENT: 3 %
GFR AFRICAN AMERICAN: >60
GFR NON-AFRICAN AMERICAN: >60
GLOBULIN: 4.1 G/DL
GLUCOSE BLD-MCNC: 331 MG/DL (ref 70–99)
HCT VFR BLD CALC: 34.5 % (ref 36–48)
HEMATOLOGY PATH CONSULT: YES
HEMOGLOBIN: 11.6 G/DL (ref 12–16)
LYMPHOCYTES ABSOLUTE: 6 K/UL (ref 1–5.1)
LYMPHOCYTES RELATIVE PERCENT: 51 %
MCH RBC QN AUTO: 31.3 PG (ref 26–34)
MCHC RBC AUTO-ENTMCNC: 33.8 G/DL (ref 31–36)
MCV RBC AUTO: 92.8 FL (ref 80–100)
MONOCYTES ABSOLUTE: 0.9 K/UL (ref 0–1.3)
MONOCYTES RELATIVE PERCENT: 8 %
NEUTROPHILS ABSOLUTE: 4.5 K/UL (ref 1.7–7.7)
NEUTROPHILS RELATIVE PERCENT: 38 %
PDW BLD-RTO: 13.7 % (ref 12.4–15.4)
PLATELET # BLD: 335 K/UL (ref 135–450)
PLATELET SLIDE REVIEW: ADEQUATE
PMV BLD AUTO: 9.6 FL (ref 5–10.5)
POTASSIUM SERPL-SCNC: 4.4 MMOL/L (ref 3.5–5.1)
RBC # BLD: 3.72 M/UL (ref 4–5.2)
RBC # BLD: NORMAL 10*6/UL
SLIDE REVIEW: ABNORMAL
SODIUM BLD-SCNC: 137 MMOL/L (ref 136–145)
TOTAL PROTEIN: 8 G/DL (ref 6.4–8.2)
WBC # BLD: 11.8 K/UL (ref 4–11)

## 2018-05-30 LAB — HEMATOLOGY PATH CONSULT: NORMAL

## 2018-06-01 ENCOUNTER — HOSPITAL ENCOUNTER (OUTPATIENT)
Dept: OTHER | Age: 33
Discharge: HOME | End: 2018-06-30
Attending: INTERNAL MEDICINE | Admitting: INTERNAL MEDICINE

## 2018-06-01 ENCOUNTER — HOSPITAL ENCOUNTER (OUTPATIENT)
Dept: ONCOLOGY | Age: 33
Discharge: HOME | End: 2018-06-30
Attending: INTERNAL MEDICINE | Admitting: INTERNAL MEDICINE

## 2018-06-07 ENCOUNTER — HOSPITAL ENCOUNTER (OUTPATIENT)
Dept: ONCOLOGY | Age: 33
Discharge: HOME | End: 2018-06-08
Admitting: INTERNAL MEDICINE

## 2018-06-07 DIAGNOSIS — R11.0 NAUSEA: ICD-10-CM

## 2018-06-07 LAB
A/G RATIO: 0.9 (ref 1.1–2.2)
ALBUMIN SERPL-MCNC: 3.8 G/DL (ref 3.4–5)
ALP BLD-CCNC: 241 U/L (ref 40–129)
ALT SERPL-CCNC: 27 U/L (ref 10–40)
ANION GAP SERPL CALCULATED.3IONS-SCNC: 11 MMOL/L (ref 3–16)
AST SERPL-CCNC: 53 U/L (ref 15–37)
BASOPHILS ABSOLUTE: 0 K/UL (ref 0–0.2)
BASOPHILS RELATIVE PERCENT: 0.5 %
BILIRUB SERPL-MCNC: 0.7 MG/DL (ref 0–1)
BUN BLDV-MCNC: 8 MG/DL (ref 7–20)
C-REACTIVE PROTEIN: 1.4 MG/L (ref 0–5.1)
CALCIUM SERPL-MCNC: 9.3 MG/DL (ref 8.3–10.6)
CHLORIDE BLD-SCNC: 99 MMOL/L (ref 99–110)
CO2: 29 MMOL/L (ref 21–32)
CREAT SERPL-MCNC: 0.7 MG/DL (ref 0.6–1.1)
EOSINOPHILS ABSOLUTE: 0.2 K/UL (ref 0–0.6)
EOSINOPHILS RELATIVE PERCENT: 2.7 %
GFR AFRICAN AMERICAN: >60
GFR NON-AFRICAN AMERICAN: >60
GLOBULIN: 4.4 G/DL
GLUCOSE BLD-MCNC: 223 MG/DL (ref 70–99)
HCT VFR BLD CALC: 34.7 % (ref 36–48)
HEMOGLOBIN: 11.4 G/DL (ref 12–16)
LYMPHOCYTES ABSOLUTE: 3.2 K/UL (ref 1–5.1)
LYMPHOCYTES RELATIVE PERCENT: 38.5 %
MCH RBC QN AUTO: 30.7 PG (ref 26–34)
MCHC RBC AUTO-ENTMCNC: 32.9 G/DL (ref 31–36)
MCV RBC AUTO: 93.4 FL (ref 80–100)
MONOCYTES ABSOLUTE: 0.6 K/UL (ref 0–1.3)
MONOCYTES RELATIVE PERCENT: 7.1 %
NEUTROPHILS ABSOLUTE: 4.3 K/UL (ref 1.7–7.7)
NEUTROPHILS RELATIVE PERCENT: 51.2 %
PDW BLD-RTO: 14 % (ref 12.4–15.4)
PLATELET # BLD: 361 K/UL (ref 135–450)
PMV BLD AUTO: 8.9 FL (ref 5–10.5)
POTASSIUM SERPL-SCNC: 4.4 MMOL/L (ref 3.5–5.1)
RBC # BLD: 3.72 M/UL (ref 4–5.2)
RHEUMATOID FACTOR: <10 IU/ML
SEDIMENTATION RATE, ERYTHROCYTE: 75 MM/HR (ref 0–20)
SODIUM BLD-SCNC: 139 MMOL/L (ref 136–145)
TOTAL PROTEIN: 8.2 G/DL (ref 6.4–8.2)
WBC # BLD: 8.4 K/UL (ref 4–11)

## 2018-06-07 RX ORDER — SODIUM CHLORIDE 0.9 % (FLUSH) 0.9 %
SYRINGE (ML) INJECTION
Status: DISPENSED
Start: 2018-06-07 | End: 2018-06-07

## 2018-06-07 RX ORDER — SODIUM CHLORIDE 0.9 % (FLUSH) 0.9 %
20 SYRINGE (ML) INJECTION PRN
Status: CANCELLED | OUTPATIENT
Start: 2018-06-07

## 2018-06-07 RX ORDER — SODIUM CHLORIDE 0.9 % (FLUSH) 0.9 %
20 SYRINGE (ML) INJECTION PRN
Status: ACTIVE | OUTPATIENT
Start: 2018-06-07 | End: 2018-06-08

## 2018-06-08 LAB
ANA INTERPRETATION: ABNORMAL
ANA TITER: ABNORMAL
ANTI-NUCLEAR ANTIBODY (ANA): POSITIVE

## 2018-06-15 ENCOUNTER — HOSPITAL ENCOUNTER (OUTPATIENT)
Dept: OTHER | Age: 33
Discharge: HOME | End: 2018-06-15
Attending: INTERNAL MEDICINE | Admitting: INTERNAL MEDICINE

## 2018-06-15 DIAGNOSIS — K59.00 CONSTIPATION, UNSPECIFIED CONSTIPATION TYPE: ICD-10-CM

## 2018-07-01 ENCOUNTER — HOSPITAL ENCOUNTER (OUTPATIENT)
Dept: ONCOLOGY | Age: 33
Discharge: HOME | End: 2018-07-31
Attending: INTERNAL MEDICINE | Admitting: INTERNAL MEDICINE

## 2018-07-06 ENCOUNTER — HOSPITAL ENCOUNTER (OUTPATIENT)
Dept: ONCOLOGY | Age: 33
Discharge: HOME | End: 2018-07-07
Admitting: INTERNAL MEDICINE

## 2018-07-06 VITALS — TEMPERATURE: 96.9 F | DIASTOLIC BLOOD PRESSURE: 71 MMHG | SYSTOLIC BLOOD PRESSURE: 115 MMHG | HEART RATE: 74 BPM

## 2018-07-06 DIAGNOSIS — R11.0 NAUSEA: ICD-10-CM

## 2018-07-06 LAB
A/G RATIO: 1 (ref 1.1–2.2)
ALBUMIN SERPL-MCNC: 3.5 G/DL (ref 3.4–5)
ALP BLD-CCNC: 322 U/L (ref 40–129)
ALT SERPL-CCNC: 59 U/L (ref 10–40)
ANION GAP SERPL CALCULATED.3IONS-SCNC: 9 MMOL/L (ref 3–16)
AST SERPL-CCNC: 72 U/L (ref 15–37)
BASOPHILS ABSOLUTE: 0 K/UL (ref 0–0.2)
BASOPHILS RELATIVE PERCENT: 0.5 %
BILIRUB SERPL-MCNC: 0.4 MG/DL (ref 0–1)
BUN BLDV-MCNC: 10 MG/DL (ref 7–20)
CALCIUM SERPL-MCNC: 8.9 MG/DL (ref 8.3–10.6)
CHLORIDE BLD-SCNC: 98 MMOL/L (ref 99–110)
CO2: 28 MMOL/L (ref 21–32)
CREAT SERPL-MCNC: 0.6 MG/DL (ref 0.6–1.1)
EOSINOPHILS ABSOLUTE: 0.3 K/UL (ref 0–0.6)
EOSINOPHILS RELATIVE PERCENT: 2.6 %
GFR AFRICAN AMERICAN: >60
GFR NON-AFRICAN AMERICAN: >60
GLOBULIN: 3.6 G/DL
GLUCOSE BLD-MCNC: 312 MG/DL (ref 70–99)
HCT VFR BLD CALC: 31.6 % (ref 36–48)
HEMOGLOBIN: 10.3 G/DL (ref 12–16)
LYMPHOCYTES ABSOLUTE: 4.4 K/UL (ref 1–5.1)
LYMPHOCYTES RELATIVE PERCENT: 45.1 %
MCH RBC QN AUTO: 30.6 PG (ref 26–34)
MCHC RBC AUTO-ENTMCNC: 32.7 G/DL (ref 31–36)
MCV RBC AUTO: 93.8 FL (ref 80–100)
MONOCYTES ABSOLUTE: 1.1 K/UL (ref 0–1.3)
MONOCYTES RELATIVE PERCENT: 11 %
NEUTROPHILS ABSOLUTE: 4 K/UL (ref 1.7–7.7)
NEUTROPHILS RELATIVE PERCENT: 40.8 %
PDW BLD-RTO: 14.5 % (ref 12.4–15.4)
PLATELET # BLD: 313 K/UL (ref 135–450)
PMV BLD AUTO: 8.9 FL (ref 5–10.5)
POTASSIUM SERPL-SCNC: 3.9 MMOL/L (ref 3.5–5.1)
RBC # BLD: 3.37 M/UL (ref 4–5.2)
SODIUM BLD-SCNC: 135 MMOL/L (ref 136–145)
TOTAL PROTEIN: 7.1 G/DL (ref 6.4–8.2)
WBC # BLD: 9.7 K/UL (ref 4–11)

## 2018-07-06 RX ORDER — SODIUM CHLORIDE 0.9 % (FLUSH) 0.9 %
20 SYRINGE (ML) INJECTION PRN
Status: DISPENSED | OUTPATIENT
Start: 2018-07-06 | End: 2018-07-07

## 2018-07-06 RX ORDER — SODIUM CHLORIDE 0.9 % (FLUSH) 0.9 %
20 SYRINGE (ML) INJECTION PRN
Status: CANCELLED | OUTPATIENT
Start: 2018-07-06

## 2018-08-01 ENCOUNTER — HOSPITAL ENCOUNTER (OUTPATIENT)
Dept: ONCOLOGY | Age: 33
Discharge: HOME | End: 2018-08-31
Attending: INTERNAL MEDICINE | Admitting: INTERNAL MEDICINE

## 2018-08-16 ENCOUNTER — HOSPITAL ENCOUNTER (OUTPATIENT)
Dept: ONCOLOGY | Age: 33
Discharge: HOME | End: 2018-08-17
Admitting: INTERNAL MEDICINE

## 2018-08-16 DIAGNOSIS — R11.0 NAUSEA: ICD-10-CM

## 2018-08-16 LAB
A/G RATIO: 1 (ref 1.1–2.2)
ALBUMIN SERPL-MCNC: 3.6 G/DL (ref 3.4–5)
ALP BLD-CCNC: 334 U/L (ref 40–129)
ALT SERPL-CCNC: 44 U/L (ref 10–40)
ANION GAP SERPL CALCULATED.3IONS-SCNC: 11 MMOL/L (ref 3–16)
AST SERPL-CCNC: 70 U/L (ref 15–37)
ATYPICAL LYMPHOCYTE RELATIVE PERCENT: 2 % (ref 0–6)
BASOPHILS ABSOLUTE: 0 K/UL (ref 0–0.2)
BASOPHILS RELATIVE PERCENT: 0 %
BILIRUB SERPL-MCNC: 0.4 MG/DL (ref 0–1)
BUN BLDV-MCNC: 14 MG/DL (ref 7–20)
CALCIUM SERPL-MCNC: 8.9 MG/DL (ref 8.3–10.6)
CHLORIDE BLD-SCNC: 100 MMOL/L (ref 99–110)
CO2: 28 MMOL/L (ref 21–32)
CREAT SERPL-MCNC: 0.6 MG/DL (ref 0.6–1.1)
EOSINOPHILS ABSOLUTE: 0.1 K/UL (ref 0–0.6)
EOSINOPHILS RELATIVE PERCENT: 1 %
GFR AFRICAN AMERICAN: >60
GFR NON-AFRICAN AMERICAN: >60
GLOBULIN: 3.7 G/DL
GLUCOSE BLD-MCNC: 249 MG/DL (ref 70–99)
HCT VFR BLD CALC: 28.8 % (ref 36–48)
HEMATOLOGY PATH CONSULT: NO
HEMOGLOBIN: 9.7 G/DL (ref 12–16)
LYMPHOCYTES ABSOLUTE: 6.8 K/UL (ref 1–5.1)
LYMPHOCYTES RELATIVE PERCENT: 55 %
MCH RBC QN AUTO: 32.2 PG (ref 26–34)
MCHC RBC AUTO-ENTMCNC: 33.6 G/DL (ref 31–36)
MCV RBC AUTO: 96 FL (ref 80–100)
MONOCYTES ABSOLUTE: 0.8 K/UL (ref 0–1.3)
MONOCYTES RELATIVE PERCENT: 7 %
NEUTROPHILS ABSOLUTE: 4.2 K/UL (ref 1.7–7.7)
NEUTROPHILS RELATIVE PERCENT: 35 %
PDW BLD-RTO: 15.3 % (ref 12.4–15.4)
PLATELET # BLD: 292 K/UL (ref 135–450)
PLATELET SLIDE REVIEW: ADEQUATE
PMV BLD AUTO: 9.4 FL (ref 5–10.5)
POTASSIUM SERPL-SCNC: 4.1 MMOL/L (ref 3.5–5.1)
RBC # BLD: 3 M/UL (ref 4–5.2)
RBC # BLD: NORMAL 10*6/UL
SLIDE REVIEW: ABNORMAL
SODIUM BLD-SCNC: 139 MMOL/L (ref 136–145)
TOTAL PROTEIN: 7.3 G/DL (ref 6.4–8.2)
WBC # BLD: 12 K/UL (ref 4–11)

## 2018-08-16 RX ORDER — SODIUM CHLORIDE 0.9 % (FLUSH) 0.9 %
20 SYRINGE (ML) INJECTION PRN
Status: CANCELLED | OUTPATIENT
Start: 2018-08-16

## 2018-08-16 RX ORDER — SODIUM CHLORIDE 0.9 % (FLUSH) 0.9 %
20 SYRINGE (ML) INJECTION PRN
Status: DISPENSED | OUTPATIENT
Start: 2018-08-16 | End: 2018-08-17

## 2018-08-20 ENCOUNTER — HOSPITAL ENCOUNTER (OUTPATIENT)
Dept: ONCOLOGY | Age: 33
Discharge: HOME | End: 2018-08-21
Admitting: INTERNAL MEDICINE

## 2018-08-20 RX ORDER — SODIUM CHLORIDE 0.9 % (FLUSH) 0.9 %
SYRINGE (ML) INJECTION
Status: DISPENSED
Start: 2018-08-20 | End: 2018-08-20

## 2018-09-01 ENCOUNTER — HOSPITAL ENCOUNTER (OUTPATIENT)
Dept: ONCOLOGY | Age: 33
Discharge: HOME | End: 2018-09-01
Attending: INTERNAL MEDICINE | Admitting: INTERNAL MEDICINE

## 2018-09-13 ENCOUNTER — HOSPITAL ENCOUNTER (OUTPATIENT)
Dept: ONCOLOGY | Age: 33
Discharge: HOME | End: 2018-09-14
Admitting: INTERNAL MEDICINE

## 2018-09-13 DIAGNOSIS — R11.0 NAUSEA: ICD-10-CM

## 2018-09-13 LAB
A/G RATIO: 0.9 (ref 1.1–2.2)
ALBUMIN SERPL-MCNC: 3.3 G/DL (ref 3.4–5)
ALP BLD-CCNC: 265 U/L (ref 40–129)
ALT SERPL-CCNC: 31 U/L (ref 10–40)
ANION GAP SERPL CALCULATED.3IONS-SCNC: 12 MMOL/L (ref 3–16)
AST SERPL-CCNC: 66 U/L (ref 15–37)
BASOPHILS ABSOLUTE: 0.4 K/UL (ref 0–0.2)
BASOPHILS RELATIVE PERCENT: 4 %
BILIRUB SERPL-MCNC: 0.4 MG/DL (ref 0–1)
BUN BLDV-MCNC: 6 MG/DL (ref 7–20)
CALCIUM SERPL-MCNC: 8.4 MG/DL (ref 8.3–10.6)
CHLORIDE BLD-SCNC: 97 MMOL/L (ref 99–110)
CO2: 25 MMOL/L (ref 21–32)
CREAT SERPL-MCNC: 0.7 MG/DL (ref 0.6–1.1)
EOSINOPHILS ABSOLUTE: 0 K/UL (ref 0–0.6)
EOSINOPHILS RELATIVE PERCENT: 0 %
GFR AFRICAN AMERICAN: >60
GFR NON-AFRICAN AMERICAN: >60
GLOBULIN: 3.8 G/DL
GLUCOSE BLD-MCNC: 477 MG/DL (ref 70–99)
HCT VFR BLD CALC: 28.3 % (ref 36–48)
HEMATOLOGY PATH CONSULT: YES
HEMOGLOBIN: 9.3 G/DL (ref 12–16)
LYMPHOCYTES ABSOLUTE: 5.1 K/UL (ref 1–5.1)
LYMPHOCYTES RELATIVE PERCENT: 48 %
MCH RBC QN AUTO: 31.4 PG (ref 26–34)
MCHC RBC AUTO-ENTMCNC: 32.9 G/DL (ref 31–36)
MCV RBC AUTO: 95.5 FL (ref 80–100)
METAMYELOCYTES RELATIVE PERCENT: 1 %
MONOCYTES ABSOLUTE: 0.5 K/UL (ref 0–1.3)
MONOCYTES RELATIVE PERCENT: 5 %
NEUTROPHILS ABSOLUTE: 4.6 K/UL (ref 1.7–7.7)
NEUTROPHILS RELATIVE PERCENT: 42 %
PDW BLD-RTO: 14.4 % (ref 12.4–15.4)
PLATELET # BLD: 379 K/UL (ref 135–450)
PLATELET SLIDE REVIEW: ADEQUATE
PMV BLD AUTO: 8.6 FL (ref 5–10.5)
POTASSIUM SERPL-SCNC: 4.1 MMOL/L (ref 3.5–5.1)
RBC # BLD: 2.96 M/UL (ref 4–5.2)
RBC # BLD: NORMAL 10*6/UL
SLIDE REVIEW: ABNORMAL
SODIUM BLD-SCNC: 134 MMOL/L (ref 136–145)
TOTAL PROTEIN: 7.1 G/DL (ref 6.4–8.2)
WBC # BLD: 10.7 K/UL (ref 4–11)

## 2018-09-13 RX ORDER — SODIUM CHLORIDE 0.9 % (FLUSH) 0.9 %
20 SYRINGE (ML) INJECTION PRN
Status: ACTIVE | OUTPATIENT
Start: 2018-09-13 | End: 2018-09-14

## 2018-09-13 RX ORDER — SODIUM CHLORIDE 0.9 % (FLUSH) 0.9 %
SYRINGE (ML) INJECTION
Status: COMPLETED
Start: 2018-09-13 | End: 2018-09-13

## 2018-09-13 RX ORDER — SODIUM CHLORIDE 0.9 % (FLUSH) 0.9 %
20 SYRINGE (ML) INJECTION PRN
Status: CANCELLED | OUTPATIENT
Start: 2018-09-13

## 2018-09-13 RX ADMIN — Medication 20 ML: at 08:53

## 2018-09-13 NOTE — PROGRESS NOTES
Patient ambulatory to department for monthly lab draw and port a cath flush. Port flushes easily with immediate brisk blood return present. Port then flushed with 10 ml saline and deaccessed. To return in one month for the same procedure.

## 2018-09-14 LAB — HEMATOLOGY PATH CONSULT: NORMAL

## 2018-10-12 ENCOUNTER — HOSPITAL ENCOUNTER (OUTPATIENT)
Dept: ONCOLOGY | Age: 33
Setting detail: INFUSION SERIES
Discharge: HOME | End: 2018-10-12
Payer: MEDICARE

## 2018-10-12 DIAGNOSIS — R11.0 NAUSEA: ICD-10-CM

## 2018-10-12 LAB
A/G RATIO: 0.9 (ref 1.1–2.2)
ALBUMIN SERPL-MCNC: 3.5 G/DL (ref 3.4–5)
ALP BLD-CCNC: 377 U/L (ref 40–129)
ALT SERPL-CCNC: 35 U/L (ref 10–40)
ANION GAP SERPL CALCULATED.3IONS-SCNC: 11 MMOL/L (ref 3–16)
AST SERPL-CCNC: 66 U/L (ref 15–37)
BASOPHILS ABSOLUTE: 0.1 K/UL (ref 0–0.2)
BASOPHILS RELATIVE PERCENT: 0.9 %
BILIRUB SERPL-MCNC: 0.4 MG/DL (ref 0–1)
BUN BLDV-MCNC: 11 MG/DL (ref 7–20)
CALCIUM SERPL-MCNC: 8.5 MG/DL (ref 8.3–10.6)
CHLORIDE BLD-SCNC: 100 MMOL/L (ref 99–110)
CO2: 26 MMOL/L (ref 21–32)
CREAT SERPL-MCNC: 0.6 MG/DL (ref 0.6–1.1)
EOSINOPHILS ABSOLUTE: 0.2 K/UL (ref 0–0.6)
EOSINOPHILS RELATIVE PERCENT: 1.6 %
GFR AFRICAN AMERICAN: >60
GFR NON-AFRICAN AMERICAN: >60
GLOBULIN: 3.8 G/DL
GLUCOSE BLD-MCNC: 415 MG/DL (ref 70–99)
HCT VFR BLD CALC: 25.2 % (ref 36–48)
HEMATOLOGY PATH CONSULT: NO
HEMOGLOBIN: 8.1 G/DL (ref 12–16)
LYMPHOCYTES ABSOLUTE: 5.4 K/UL (ref 1–5.1)
LYMPHOCYTES RELATIVE PERCENT: 45.5 %
MCH RBC QN AUTO: 30 PG (ref 26–34)
MCHC RBC AUTO-ENTMCNC: 32 G/DL (ref 31–36)
MCV RBC AUTO: 93.7 FL (ref 80–100)
MONOCYTES ABSOLUTE: 1.2 K/UL (ref 0–1.3)
MONOCYTES RELATIVE PERCENT: 10.2 %
NEUTROPHILS ABSOLUTE: 5 K/UL (ref 1.7–7.7)
NEUTROPHILS RELATIVE PERCENT: 41.8 %
PDW BLD-RTO: 14.6 % (ref 12.4–15.4)
PLATELET # BLD: 388 K/UL (ref 135–450)
PMV BLD AUTO: 8.6 FL (ref 5–10.5)
POTASSIUM SERPL-SCNC: 3.9 MMOL/L (ref 3.5–5.1)
RBC # BLD: 2.69 M/UL (ref 4–5.2)
SODIUM BLD-SCNC: 137 MMOL/L (ref 136–145)
TOTAL PROTEIN: 7.3 G/DL (ref 6.4–8.2)
WBC # BLD: 11.9 K/UL (ref 4–11)

## 2018-10-12 PROCEDURE — 85025 COMPLETE CBC W/AUTO DIFF WBC: CPT

## 2018-10-12 PROCEDURE — 80053 COMPREHEN METABOLIC PANEL: CPT

## 2018-10-12 PROCEDURE — 36591 DRAW BLOOD OFF VENOUS DEVICE: CPT

## 2018-10-12 RX ORDER — SODIUM CHLORIDE 0.9 % (FLUSH) 0.9 %
20 SYRINGE (ML) INJECTION PRN
Status: DISCONTINUED | OUTPATIENT
Start: 2018-10-12 | End: 2018-10-13 | Stop reason: HOSPADM

## 2018-10-12 RX ORDER — SODIUM CHLORIDE 0.9 % (FLUSH) 0.9 %
20 SYRINGE (ML) INJECTION PRN
Status: CANCELLED | OUTPATIENT
Start: 2018-10-12

## 2018-10-12 NOTE — PROGRESS NOTES
Ambulated into infusion clinic with steady gait, no assistive device. Left SC IVAD accessed with 19ga 1\" [place] with aseptic technique. Excellent blood return, lab specimens obtained. Device flushed with 20cc NS, flushes easily. Device de-accessed: insertion site without red/swell/bleed/exudate/hematoma/ecchymosis, dry dressing applied. Pt states she is to receive iron infusions at [facility] x6 weeks starting next month. Instructed patient to call us for access/flush appointment a month after last iron infusion: pt states understanding to call for next appointment.

## 2018-11-05 ENCOUNTER — HOSPITAL ENCOUNTER (OUTPATIENT)
Dept: ONCOLOGY | Age: 33
Setting detail: INFUSION SERIES
Discharge: HOME | End: 2018-11-05
Payer: MEDICARE

## 2018-11-05 LAB
C3 COMPLEMENT: 103.3 MG/DL (ref 90–180)
C4 COMPLEMENT: 11.4 MG/DL (ref 10–40)
SEDIMENTATION RATE, ERYTHROCYTE: 27 MM/HR (ref 0–20)

## 2018-11-05 PROCEDURE — 86225 DNA ANTIBODY NATIVE: CPT

## 2018-11-05 PROCEDURE — 86160 COMPLEMENT ANTIGEN: CPT

## 2018-11-05 PROCEDURE — 86235 NUCLEAR ANTIGEN ANTIBODY: CPT

## 2018-11-05 PROCEDURE — 86038 ANTINUCLEAR ANTIBODIES: CPT

## 2018-11-05 PROCEDURE — 83516 IMMUNOASSAY NONANTIBODY: CPT

## 2018-11-05 PROCEDURE — 36591 DRAW BLOOD OFF VENOUS DEVICE: CPT

## 2018-11-05 PROCEDURE — 85652 RBC SED RATE AUTOMATED: CPT

## 2018-11-05 PROCEDURE — 86039 ANTINUCLEAR ANTIBODIES (ANA): CPT

## 2018-11-05 NOTE — PROGRESS NOTES
To clinic for lab draw via port. Port with brisk blood return and flushed easily. To F/U with Dr. Zoë Vance office with results.

## 2018-11-06 LAB
ANA INTERPRETATION: ABNORMAL
ANA PATTERN: ABNORMAL
ANA TITER: ABNORMAL
ANTI-NUCLEAR ANTIBODY (ANA): POSITIVE

## 2018-11-07 LAB
CENTROMERE AB IGG: 1 AU/ML (ref 0–40)
DOUBLE STRANDED DNA AB, IGG: NORMAL
ENA TO RNP ANTIBODY: 0 AU/ML (ref 0–40)
ENA TO SMITH (SM) ANTIBODY: 0 AU/ML (ref 0–40)
ENA TO SSB (LA) ANTIBODY: 0 AU/ML (ref 0–40)
MISCELLANEOUS LAB TEST ORDER: NORMAL
SCLERODERMA (SCL-70) AB: 1 AU/ML (ref 0–40)
SSA 52 (RO) (ENA) AB, IGG: 15 AU/ML (ref 0–40)
SSA 60 (RO) (ENA) AB, IGG: 2 AU/ML (ref 0–40)

## 2019-01-14 ENCOUNTER — HOSPITAL ENCOUNTER (OUTPATIENT)
Dept: ONCOLOGY | Age: 34
Setting detail: INFUSION SERIES
Discharge: HOME | End: 2019-01-14
Payer: MEDICARE

## 2019-01-14 DIAGNOSIS — R11.0 NAUSEA: ICD-10-CM

## 2019-01-14 LAB
A/G RATIO: 0.9 (ref 1.1–2.2)
ALBUMIN SERPL-MCNC: 3.3 G/DL (ref 3.4–5)
ALP BLD-CCNC: 305 U/L (ref 40–129)
ALT SERPL-CCNC: 29 U/L (ref 10–40)
ANION GAP SERPL CALCULATED.3IONS-SCNC: 10 MMOL/L (ref 3–16)
AST SERPL-CCNC: 61 U/L (ref 15–37)
BASOPHILS ABSOLUTE: 0.1 K/UL (ref 0–0.2)
BASOPHILS RELATIVE PERCENT: 0.7 %
BILIRUB SERPL-MCNC: 0.5 MG/DL (ref 0–1)
BUN BLDV-MCNC: 5 MG/DL (ref 7–20)
CALCIUM SERPL-MCNC: 8.6 MG/DL (ref 8.3–10.6)
CHLORIDE BLD-SCNC: 96 MMOL/L (ref 99–110)
CO2: 28 MMOL/L (ref 21–32)
CREAT SERPL-MCNC: 0.7 MG/DL (ref 0.6–1.1)
EOSINOPHILS ABSOLUTE: 0.2 K/UL (ref 0–0.6)
EOSINOPHILS RELATIVE PERCENT: 2.5 %
GFR AFRICAN AMERICAN: >60
GFR NON-AFRICAN AMERICAN: >60
GLOBULIN: 3.8 G/DL
GLUCOSE BLD-MCNC: 555 MG/DL (ref 70–99)
HCT VFR BLD CALC: 29.6 % (ref 36–48)
HEMOGLOBIN: 9.8 G/DL (ref 12–16)
LYMPHOCYTES ABSOLUTE: 5.2 K/UL (ref 1–5.1)
LYMPHOCYTES RELATIVE PERCENT: 56.8 %
MCH RBC QN AUTO: 31.9 PG (ref 26–34)
MCHC RBC AUTO-ENTMCNC: 33 G/DL (ref 31–36)
MCV RBC AUTO: 96.6 FL (ref 80–100)
MONOCYTES ABSOLUTE: 0.8 K/UL (ref 0–1.3)
MONOCYTES RELATIVE PERCENT: 9 %
NEUTROPHILS ABSOLUTE: 2.8 K/UL (ref 1.7–7.7)
NEUTROPHILS RELATIVE PERCENT: 31 %
PDW BLD-RTO: 15.4 % (ref 12.4–15.4)
PLATELET # BLD: 309 K/UL (ref 135–450)
PMV BLD AUTO: 9.5 FL (ref 5–10.5)
POTASSIUM SERPL-SCNC: 4.4 MMOL/L (ref 3.5–5.1)
RBC # BLD: 3.07 M/UL (ref 4–5.2)
SODIUM BLD-SCNC: 134 MMOL/L (ref 136–145)
TOTAL PROTEIN: 7.1 G/DL (ref 6.4–8.2)
WBC # BLD: 9.1 K/UL (ref 4–11)

## 2019-01-14 PROCEDURE — 85025 COMPLETE CBC W/AUTO DIFF WBC: CPT

## 2019-01-14 PROCEDURE — 36591 DRAW BLOOD OFF VENOUS DEVICE: CPT

## 2019-01-14 PROCEDURE — 80053 COMPREHEN METABOLIC PANEL: CPT

## 2019-01-14 RX ORDER — SODIUM CHLORIDE 0.9 % (FLUSH) 0.9 %
20 SYRINGE (ML) INJECTION PRN
Status: DISCONTINUED | OUTPATIENT
Start: 2019-01-14 | End: 2019-01-15 | Stop reason: HOSPADM

## 2019-01-14 RX ORDER — SODIUM CHLORIDE 0.9 % (FLUSH) 0.9 %
20 SYRINGE (ML) INJECTION PRN
Status: CANCELLED | OUTPATIENT
Start: 2019-01-14

## 2019-01-14 NOTE — PROGRESS NOTES
Ambulatory to department for lab draw from port a cath. Port flushes easily with immediate brisk blood return present. Tolerated procedure well. To return in one month for same procedure.

## 2019-02-11 ENCOUNTER — HOSPITAL ENCOUNTER (OUTPATIENT)
Dept: ONCOLOGY | Age: 34
Setting detail: INFUSION SERIES
Discharge: HOME | End: 2019-02-11
Payer: MEDICARE

## 2019-02-11 DIAGNOSIS — R11.0 NAUSEA: ICD-10-CM

## 2019-02-11 LAB
A/G RATIO: 1 (ref 1.1–2.2)
ALBUMIN SERPL-MCNC: 3.7 G/DL (ref 3.4–5)
ALP BLD-CCNC: 248 U/L (ref 40–129)
ALT SERPL-CCNC: 43 U/L (ref 10–40)
ANION GAP SERPL CALCULATED.3IONS-SCNC: 11 MMOL/L (ref 3–16)
ANISOCYTOSIS: ABNORMAL
AST SERPL-CCNC: 60 U/L (ref 15–37)
BASOPHILS ABSOLUTE: 0 K/UL (ref 0–0.2)
BASOPHILS RELATIVE PERCENT: 0 %
BILIRUB SERPL-MCNC: 0.8 MG/DL (ref 0–1)
BUN BLDV-MCNC: 7 MG/DL (ref 7–20)
CALCIUM SERPL-MCNC: 8.6 MG/DL (ref 8.3–10.6)
CHLORIDE BLD-SCNC: 100 MMOL/L (ref 99–110)
CO2: 26 MMOL/L (ref 21–32)
CREAT SERPL-MCNC: 0.6 MG/DL (ref 0.6–1.1)
EOSINOPHILS ABSOLUTE: 0.3 K/UL (ref 0–0.6)
EOSINOPHILS RELATIVE PERCENT: 2 %
GFR AFRICAN AMERICAN: >60
GFR NON-AFRICAN AMERICAN: >60
GLOBULIN: 3.8 G/DL
GLUCOSE BLD-MCNC: 175 MG/DL (ref 70–99)
HCT VFR BLD CALC: 32.3 % (ref 36–48)
HEMOGLOBIN: 10.6 G/DL (ref 12–16)
LYMPHOCYTES ABSOLUTE: 8.2 K/UL (ref 1–5.1)
LYMPHOCYTES RELATIVE PERCENT: 56 %
MCH RBC QN AUTO: 31.4 PG (ref 26–34)
MCHC RBC AUTO-ENTMCNC: 32.7 G/DL (ref 31–36)
MCV RBC AUTO: 96.1 FL (ref 80–100)
MONOCYTES ABSOLUTE: 1 K/UL (ref 0–1.3)
MONOCYTES RELATIVE PERCENT: 7 %
NEUTROPHILS ABSOLUTE: 5.1 K/UL (ref 1.7–7.7)
NEUTROPHILS RELATIVE PERCENT: 35 %
PDW BLD-RTO: 15.5 % (ref 12.4–15.4)
PLATELET # BLD: 291 K/UL (ref 135–450)
PLATELET SLIDE REVIEW: ADEQUATE
PMV BLD AUTO: 9.5 FL (ref 5–10.5)
POLYCHROMASIA: ABNORMAL
POTASSIUM SERPL-SCNC: 4.1 MMOL/L (ref 3.5–5.1)
RBC # BLD: 3.36 M/UL (ref 4–5.2)
SLIDE REVIEW: ABNORMAL
SODIUM BLD-SCNC: 137 MMOL/L (ref 136–145)
TARGET CELLS: ABNORMAL
TOTAL PROTEIN: 7.5 G/DL (ref 6.4–8.2)
WBC # BLD: 14.7 K/UL (ref 4–11)

## 2019-02-11 PROCEDURE — 36591 DRAW BLOOD OFF VENOUS DEVICE: CPT

## 2019-02-11 PROCEDURE — 85025 COMPLETE CBC W/AUTO DIFF WBC: CPT

## 2019-02-11 PROCEDURE — 80053 COMPREHEN METABOLIC PANEL: CPT

## 2019-02-11 RX ORDER — SODIUM CHLORIDE 0.9 % (FLUSH) 0.9 %
20 SYRINGE (ML) INJECTION PRN
Status: CANCELLED | OUTPATIENT
Start: 2019-02-11

## 2019-02-11 RX ORDER — SODIUM CHLORIDE 0.9 % (FLUSH) 0.9 %
20 SYRINGE (ML) INJECTION PRN
Status: DISCONTINUED | OUTPATIENT
Start: 2019-02-11 | End: 2019-02-12 | Stop reason: HOSPADM

## 2019-02-11 NOTE — PROGRESS NOTES
To department for blood draw from port a cath. Port accessed. Flushes easily with brisk blood return present. Labs drawn. Port then flushed and deaccessed.  To return in one month

## 2019-03-11 ENCOUNTER — HOSPITAL ENCOUNTER (OUTPATIENT)
Dept: ONCOLOGY | Age: 34
Setting detail: INFUSION SERIES
Discharge: HOME | End: 2019-03-11
Payer: MEDICARE

## 2019-03-11 DIAGNOSIS — R11.0 NAUSEA: Primary | ICD-10-CM

## 2019-03-11 LAB
A/G RATIO: 0.9 (ref 1.1–2.2)
ALBUMIN SERPL-MCNC: 3.5 G/DL (ref 3.4–5)
ALP BLD-CCNC: 323 U/L (ref 40–129)
ALT SERPL-CCNC: 31 U/L (ref 10–40)
ANION GAP SERPL CALCULATED.3IONS-SCNC: 9 MMOL/L (ref 3–16)
AST SERPL-CCNC: 68 U/L (ref 15–37)
BASOPHILS ABSOLUTE: 0.3 K/UL (ref 0–0.2)
BASOPHILS RELATIVE PERCENT: 3 %
BILIRUB SERPL-MCNC: 0.7 MG/DL (ref 0–1)
BUN BLDV-MCNC: 8 MG/DL (ref 7–20)
CALCIUM SERPL-MCNC: 8.8 MG/DL (ref 8.3–10.6)
CHLORIDE BLD-SCNC: 102 MMOL/L (ref 99–110)
CO2: 28 MMOL/L (ref 21–32)
CREAT SERPL-MCNC: 0.6 MG/DL (ref 0.6–1.1)
EOSINOPHILS ABSOLUTE: 0.2 K/UL (ref 0–0.6)
EOSINOPHILS RELATIVE PERCENT: 2 %
GFR AFRICAN AMERICAN: >60
GFR NON-AFRICAN AMERICAN: >60
GLOBULIN: 3.9 G/DL
GLUCOSE BLD-MCNC: 171 MG/DL (ref 70–99)
HCT VFR BLD CALC: 29.3 % (ref 36–48)
HEMATOLOGY PATH CONSULT: YES
HEMOGLOBIN: 9.8 G/DL (ref 12–16)
LYMPHOCYTES ABSOLUTE: 6.7 K/UL (ref 1–5.1)
LYMPHOCYTES RELATIVE PERCENT: 64 %
MCH RBC QN AUTO: 32.1 PG (ref 26–34)
MCHC RBC AUTO-ENTMCNC: 33.6 G/DL (ref 31–36)
MCV RBC AUTO: 95.8 FL (ref 80–100)
MONOCYTES ABSOLUTE: 0 K/UL (ref 0–1.3)
MONOCYTES RELATIVE PERCENT: 0 %
MONONUCLEAR UNIDENTIFIED CELLS: 1 %
NEUTROPHILS ABSOLUTE: 3.1 K/UL (ref 1.7–7.7)
NEUTROPHILS RELATIVE PERCENT: 30 %
PDW BLD-RTO: 15.3 % (ref 12.4–15.4)
PLATELET # BLD: 264 K/UL (ref 135–450)
PLATELET SLIDE REVIEW: ADEQUATE
PMV BLD AUTO: 9.5 FL (ref 5–10.5)
POTASSIUM SERPL-SCNC: 3.8 MMOL/L (ref 3.5–5.1)
RBC # BLD: 3.06 M/UL (ref 4–5.2)
RBC # BLD: NORMAL 10*6/UL
SLIDE REVIEW: ABNORMAL
SODIUM BLD-SCNC: 139 MMOL/L (ref 136–145)
TOTAL PROTEIN: 7.4 G/DL (ref 6.4–8.2)
WBC # BLD: 10.4 K/UL (ref 4–11)

## 2019-03-11 PROCEDURE — 80053 COMPREHEN METABOLIC PANEL: CPT

## 2019-03-11 PROCEDURE — 36591 DRAW BLOOD OFF VENOUS DEVICE: CPT

## 2019-03-11 PROCEDURE — 85025 COMPLETE CBC W/AUTO DIFF WBC: CPT

## 2019-03-11 RX ORDER — SODIUM CHLORIDE 0.9 % (FLUSH) 0.9 %
20 SYRINGE (ML) INJECTION PRN
Status: CANCELLED | OUTPATIENT
Start: 2019-03-11

## 2019-03-11 RX ORDER — SODIUM CHLORIDE 0.9 % (FLUSH) 0.9 %
20 SYRINGE (ML) INJECTION PRN
Status: DISCONTINUED | OUTPATIENT
Start: 2019-03-11 | End: 2019-03-12 | Stop reason: HOSPADM

## 2019-03-11 NOTE — PROGRESS NOTES
To clinic for lab draw via port. Port with brisk blood return and flushed easily. To F/U with Dr. Sam Warner' office with results.

## 2019-03-12 LAB — HEMATOLOGY PATH CONSULT: NORMAL

## 2019-04-12 ENCOUNTER — HOSPITAL ENCOUNTER (OUTPATIENT)
Dept: ONCOLOGY | Age: 34
End: 2019-04-12

## 2019-07-09 ENCOUNTER — ANESTHESIA EVENT (OUTPATIENT)
Dept: ENDOSCOPY | Age: 34
End: 2019-07-09
Payer: MEDICARE

## 2019-07-25 ENCOUNTER — ANESTHESIA (OUTPATIENT)
Dept: ENDOSCOPY | Age: 34
End: 2019-07-25
Payer: MEDICARE

## 2019-07-25 ENCOUNTER — HOSPITAL ENCOUNTER (OUTPATIENT)
Age: 34
Setting detail: OUTPATIENT SURGERY
Discharge: HOME | End: 2019-07-25
Attending: INTERNAL MEDICINE | Admitting: INTERNAL MEDICINE
Payer: MEDICARE

## 2019-07-25 VITALS
BODY MASS INDEX: 20.89 KG/M2 | OXYGEN SATURATION: 100 % | DIASTOLIC BLOOD PRESSURE: 64 MMHG | TEMPERATURE: 97.5 F | WEIGHT: 130 LBS | RESPIRATION RATE: 18 BRPM | HEART RATE: 65 BPM | SYSTOLIC BLOOD PRESSURE: 98 MMHG | HEIGHT: 66 IN

## 2019-07-25 VITALS
SYSTOLIC BLOOD PRESSURE: 104 MMHG | RESPIRATION RATE: 17 BRPM | DIASTOLIC BLOOD PRESSURE: 64 MMHG | OXYGEN SATURATION: 100 %

## 2019-07-25 LAB
GLUCOSE BLD-MCNC: 121 MG/DL (ref 70–99)
GLUCOSE BLD-MCNC: 147 MG/DL (ref 70–99)
HCG(URINE) PREGNANCY TEST: NEGATIVE
PERFORMED ON: ABNORMAL
PERFORMED ON: ABNORMAL

## 2019-07-25 PROCEDURE — 6360000002 HC RX W HCPCS: Performed by: NURSE ANESTHETIST, CERTIFIED REGISTERED

## 2019-07-25 PROCEDURE — 3609009500 HC COLONOSCOPY DIAGNOSTIC OR SCREENING: Performed by: INTERNAL MEDICINE

## 2019-07-25 PROCEDURE — 88305 TISSUE EXAM BY PATHOLOGIST: CPT

## 2019-07-25 PROCEDURE — 2709999900 HC NON-CHARGEABLE SUPPLY: Performed by: INTERNAL MEDICINE

## 2019-07-25 PROCEDURE — 3700000000 HC ANESTHESIA ATTENDED CARE: Performed by: INTERNAL MEDICINE

## 2019-07-25 PROCEDURE — 3700000001 HC ADD 15 MINUTES (ANESTHESIA): Performed by: INTERNAL MEDICINE

## 2019-07-25 PROCEDURE — 3609012400 HC EGD TRANSORAL BIOPSY SINGLE/MULTIPLE: Performed by: INTERNAL MEDICINE

## 2019-07-25 PROCEDURE — 7100000010 HC PHASE II RECOVERY - FIRST 15 MIN: Performed by: INTERNAL MEDICINE

## 2019-07-25 PROCEDURE — 84703 CHORIONIC GONADOTROPIN ASSAY: CPT

## 2019-07-25 PROCEDURE — 2580000003 HC RX 258: Performed by: NURSE ANESTHETIST, CERTIFIED REGISTERED

## 2019-07-25 PROCEDURE — 2500000003 HC RX 250 WO HCPCS: Performed by: NURSE ANESTHETIST, CERTIFIED REGISTERED

## 2019-07-25 PROCEDURE — 2580000003 HC RX 258: Performed by: ANESTHESIOLOGY

## 2019-07-25 PROCEDURE — 6370000000 HC RX 637 (ALT 250 FOR IP): Performed by: INTERNAL MEDICINE

## 2019-07-25 PROCEDURE — 7100000011 HC PHASE II RECOVERY - ADDTL 15 MIN: Performed by: INTERNAL MEDICINE

## 2019-07-25 RX ORDER — SODIUM CHLORIDE 0.9 % (FLUSH) 0.9 %
10 SYRINGE (ML) INJECTION PRN
Status: DISCONTINUED | OUTPATIENT
Start: 2019-07-25 | End: 2019-07-25 | Stop reason: HOSPADM

## 2019-07-25 RX ORDER — SODIUM CHLORIDE 9 MG/ML
INJECTION, SOLUTION INTRAVENOUS CONTINUOUS
Status: DISCONTINUED | OUTPATIENT
Start: 2019-07-25 | End: 2019-07-25 | Stop reason: HOSPADM

## 2019-07-25 RX ORDER — SODIUM CHLORIDE 0.9 % (FLUSH) 0.9 %
10 SYRINGE (ML) INJECTION EVERY 12 HOURS SCHEDULED
Status: DISCONTINUED | OUTPATIENT
Start: 2019-07-25 | End: 2019-07-25 | Stop reason: HOSPADM

## 2019-07-25 RX ORDER — KETAMINE HCL IN NACL, ISO-OSM 100MG/10ML
SYRINGE (ML) INJECTION PRN
Status: DISCONTINUED | OUTPATIENT
Start: 2019-07-25 | End: 2019-07-25 | Stop reason: SDUPTHER

## 2019-07-25 RX ORDER — ACETAMINOPHEN 500 MG
500 TABLET ORAL EVERY 6 HOURS PRN
COMMUNITY

## 2019-07-25 RX ORDER — LIDOCAINE HYDROCHLORIDE 10 MG/ML
1 INJECTION, SOLUTION EPIDURAL; INFILTRATION; INTRACAUDAL; PERINEURAL
Status: DISCONTINUED | OUTPATIENT
Start: 2019-07-25 | End: 2019-07-25 | Stop reason: HOSPADM

## 2019-07-25 RX ORDER — MIDAZOLAM HYDROCHLORIDE 1 MG/ML
INJECTION INTRAMUSCULAR; INTRAVENOUS PRN
Status: DISCONTINUED | OUTPATIENT
Start: 2019-07-25 | End: 2019-07-25 | Stop reason: SDUPTHER

## 2019-07-25 RX ORDER — SODIUM CHLORIDE 9 MG/ML
INJECTION, SOLUTION INTRAVENOUS CONTINUOUS PRN
Status: DISCONTINUED | OUTPATIENT
Start: 2019-07-25 | End: 2019-07-25 | Stop reason: SDUPTHER

## 2019-07-25 RX ORDER — GLYCOPYRROLATE 1 MG/5 ML
SYRINGE (ML) INTRAVENOUS PRN
Status: DISCONTINUED | OUTPATIENT
Start: 2019-07-25 | End: 2019-07-25 | Stop reason: SDUPTHER

## 2019-07-25 RX ORDER — PROPOFOL 10 MG/ML
INJECTION, EMULSION INTRAVENOUS PRN
Status: DISCONTINUED | OUTPATIENT
Start: 2019-07-25 | End: 2019-07-25 | Stop reason: SDUPTHER

## 2019-07-25 RX ORDER — LIDOCAINE HYDROCHLORIDE 20 MG/ML
INJECTION, SOLUTION EPIDURAL; INFILTRATION; INTRACAUDAL; PERINEURAL PRN
Status: DISCONTINUED | OUTPATIENT
Start: 2019-07-25 | End: 2019-07-25 | Stop reason: SDUPTHER

## 2019-07-25 RX ORDER — FUROSEMIDE 40 MG/1
40 TABLET ORAL DAILY
COMMUNITY

## 2019-07-25 RX ADMIN — SODIUM CHLORIDE: 9 INJECTION, SOLUTION INTRAVENOUS at 07:37

## 2019-07-25 RX ADMIN — Medication 10 ML: at 09:20

## 2019-07-25 RX ADMIN — SODIUM CHLORIDE: 9 INJECTION, SOLUTION INTRAVENOUS at 07:36

## 2019-07-25 RX ADMIN — MIDAZOLAM HYDROCHLORIDE 1 MG: 1 INJECTION, SOLUTION INTRAMUSCULAR; INTRAVENOUS at 08:03

## 2019-07-25 RX ADMIN — Medication 0.2 MG: at 07:58

## 2019-07-25 RX ADMIN — MIDAZOLAM HYDROCHLORIDE 1 MG: 1 INJECTION, SOLUTION INTRAMUSCULAR; INTRAVENOUS at 07:59

## 2019-07-25 RX ADMIN — Medication 30 MG: at 07:59

## 2019-07-25 RX ADMIN — PROPOFOL 200 MG: 10 INJECTION, EMULSION INTRAVENOUS at 07:59

## 2019-07-25 RX ADMIN — LIDOCAINE HYDROCHLORIDE 25 MG: 20 INJECTION, SOLUTION EPIDURAL; INFILTRATION; INTRACAUDAL; PERINEURAL at 07:59

## 2019-07-25 RX ADMIN — Medication 10 ML: at 09:17

## 2019-07-25 ASSESSMENT — PAIN SCALES - GENERAL
PAINLEVEL_OUTOF10: 0

## 2019-07-25 ASSESSMENT — PAIN - FUNCTIONAL ASSESSMENT: PAIN_FUNCTIONAL_ASSESSMENT: 0-10

## 2019-07-25 ASSESSMENT — PAIN DESCRIPTION - DESCRIPTORS: DESCRIPTORS: SHARP;DULL

## 2019-07-25 NOTE — PROGRESS NOTES
J tube intact. .Patient comfortable. Port-a-cath intact and fluids infusing.
Teaching / education initiated regarding perioperative experience, expectations, and pain management during stay. Patient verbalized understanding.
differently: Inject into the skin Sliding scale)  acetaminophen (TYLENOL) 500 MG tablet, Take 500 mg by mouth every 6 hours as needed for Pain  polyethylene glycol (GLYCOLAX) packet, Take 102 g by mouth daily Pt normally takes 6 bags of 17 g every night.    Pancrelipase, Lip-Prot-Amyl, (CREON) 32327 UNITS CPEP, Take 1 tablet by mouth 3 times daily Takes 1 tablets with each meal  ondansetron (ZOFRAN) 4 MG tablet, Take 4 mg by mouth every 6 hours as needed for Nausea or Vomiting  vitamin D (ERGOCALCIFEROL) 59361 UNITS CAPS capsule, Take 50,000 Units by mouth once a week    Allergies:  Adhesive tape; Iron glycinate [iron]; and Pepcid [famotidine]    Social History:  Social History     Socioeconomic History    Marital status: Single     Spouse name: Not on file    Number of children: Not on file    Years of education: Not on file    Highest education level: Not on file   Occupational History    Not on file   Social Needs    Financial resource strain: Not on file    Food insecurity:     Worry: Not on file     Inability: Not on file    Transportation needs:     Medical: Not on file     Non-medical: Not on file   Tobacco Use    Smoking status: Never Smoker    Smokeless tobacco: Never Used   Substance and Sexual Activity    Alcohol use: No    Drug use: No    Sexual activity: Not on file   Lifestyle    Physical activity:     Days per week: Not on file     Minutes per session: Not on file    Stress: Not on file   Relationships    Social connections:     Talks on phone: Not on file     Gets together: Not on file     Attends Yarsanism service: Not on file     Active member of club or organization: Not on file     Attends meetings of clubs or organizations: Not on file     Relationship status: Not on file    Intimate partner violence:     Fear of current or ex partner: Not on file     Emotionally abused: Not on file     Physically abused: Not on file     Forced sexual activity: Not on file   Other Topics Concern

## 2019-07-25 NOTE — ANESTHESIA POSTPROCEDURE EVALUATION
Department of Anesthesiology  Postprocedure Note    Patient: Myesha Melendez  MRN: [id number]  YOB: 1985  Date of evaluation: 7/25/2019  Time:  8:55 AM     Procedure Summary     Date:  07/25/19 Room / Location:  [facility] ENDO 01 / [facility] ENDOSCOPY    Anesthesia Start:  4199 Anesthesia Stop:  0845    Procedures:       COLONOSCOPY (N/A )      EGD BIOPSY (N/A Abdomen) Diagnosis:  (D64.9 - ANEMIA)    Surgeon:  Reynaldo El MD Responsible Provider:  Gwen Sánchez MD    Anesthesia Type:  MAC ASA Status:  2          Anesthesia Type: MAC    Barry Phase I: Barry Score: 10    Barry Phase II:      Last vitals: Reviewed and per EMR flowsheets.        Anesthesia Post Evaluation    Patient location during evaluation: PACU  Patient participation: complete - patient participated  Level of consciousness: awake  Airway patency: patent  Nausea & Vomiting: no vomiting and no nausea  Complications: no  Cardiovascular status: hemodynamically stable  Respiratory status: acceptable  Hydration status: stable

## 2019-10-09 DIAGNOSIS — E43 PROTEIN-CALORIE MALNUTRITION, SEVERE (HCC): ICD-10-CM

## 2019-10-09 RX ORDER — SODIUM CHLORIDE 0.9 % (FLUSH) 0.9 %
20 SYRINGE (ML) INJECTION ONCE
Status: CANCELLED | OUTPATIENT
Start: 2019-11-01

## 2019-10-11 ENCOUNTER — HOSPITAL ENCOUNTER (OUTPATIENT)
Dept: ONCOLOGY | Age: 34
Setting detail: INFUSION SERIES
Discharge: HOME | End: 2019-10-11
Payer: MEDICARE

## 2019-10-11 LAB
A/G RATIO: 0.9 (ref 1.1–2.2)
ALBUMIN SERPL-MCNC: 3.4 G/DL (ref 3.4–5)
ALP BLD-CCNC: 190 U/L (ref 40–129)
ALT SERPL-CCNC: 33 U/L (ref 10–40)
ANION GAP SERPL CALCULATED.3IONS-SCNC: 8 MMOL/L (ref 3–16)
AST SERPL-CCNC: 57 U/L (ref 15–37)
BASOPHILS ABSOLUTE: 0.1 K/UL (ref 0–0.2)
BASOPHILS RELATIVE PERCENT: 0.7 %
BILIRUB SERPL-MCNC: 0.6 MG/DL (ref 0–1)
BUN BLDV-MCNC: 9 MG/DL (ref 7–20)
CALCIUM SERPL-MCNC: 8.7 MG/DL (ref 8.3–10.6)
CHLORIDE BLD-SCNC: 102 MMOL/L (ref 99–110)
CO2: 29 MMOL/L (ref 21–32)
CREAT SERPL-MCNC: 0.6 MG/DL (ref 0.6–1.1)
EOSINOPHILS ABSOLUTE: 0.1 K/UL (ref 0–0.6)
EOSINOPHILS RELATIVE PERCENT: 1.4 %
FERRITIN: 331.6 NG/ML (ref 15–150)
GFR AFRICAN AMERICAN: >60
GFR NON-AFRICAN AMERICAN: >60
GLOBULIN: 4 G/DL
GLUCOSE BLD-MCNC: 250 MG/DL (ref 70–99)
HCT VFR BLD CALC: 32.9 % (ref 36–48)
HEMOGLOBIN: 11.2 G/DL (ref 12–16)
IRON SATURATION: 60 % (ref 15–50)
IRON: 98 UG/DL (ref 37–145)
LYMPHOCYTES ABSOLUTE: 4.5 K/UL (ref 1–5.1)
LYMPHOCYTES RELATIVE PERCENT: 53.8 %
MCH RBC QN AUTO: 33.3 PG (ref 26–34)
MCHC RBC AUTO-ENTMCNC: 33.9 G/DL (ref 31–36)
MCV RBC AUTO: 98.2 FL (ref 80–100)
MONOCYTES ABSOLUTE: 0.7 K/UL (ref 0–1.3)
MONOCYTES RELATIVE PERCENT: 8.5 %
NEUTROPHILS ABSOLUTE: 3 K/UL (ref 1.7–7.7)
NEUTROPHILS RELATIVE PERCENT: 35.6 %
PDW BLD-RTO: 13.7 % (ref 12.4–15.4)
PLATELET # BLD: 198 K/UL (ref 135–450)
PMV BLD AUTO: 9.5 FL (ref 5–10.5)
POTASSIUM SERPL-SCNC: 3.7 MMOL/L (ref 3.5–5.1)
RBC # BLD: 3.35 M/UL (ref 4–5.2)
SODIUM BLD-SCNC: 139 MMOL/L (ref 136–145)
TOTAL IRON BINDING CAPACITY: 163 UG/DL (ref 260–445)
TOTAL PROTEIN: 7.4 G/DL (ref 6.4–8.2)
WBC # BLD: 8.4 K/UL (ref 4–11)

## 2019-10-11 PROCEDURE — 83550 IRON BINDING TEST: CPT

## 2019-10-11 PROCEDURE — 82728 ASSAY OF FERRITIN: CPT

## 2019-10-11 PROCEDURE — 80053 COMPREHEN METABOLIC PANEL: CPT

## 2019-10-11 PROCEDURE — 85025 COMPLETE CBC W/AUTO DIFF WBC: CPT

## 2019-10-11 PROCEDURE — 36591 DRAW BLOOD OFF VENOUS DEVICE: CPT

## 2019-10-11 PROCEDURE — 83540 ASSAY OF IRON: CPT

## 2019-11-04 ENCOUNTER — HOSPITAL ENCOUNTER (INPATIENT)
Age: 34
LOS: 3 days | Discharge: HOME | DRG: 391 | End: 2019-11-07
Attending: EMERGENCY MEDICINE | Admitting: HOSPITALIST
Payer: MEDICARE

## 2019-11-04 ENCOUNTER — APPOINTMENT (OUTPATIENT)
Dept: CT IMAGING | Age: 34
DRG: 391 | End: 2019-11-04
Payer: MEDICARE

## 2019-11-04 DIAGNOSIS — K56.609 SBO (SMALL BOWEL OBSTRUCTION) (HCC): Primary | ICD-10-CM

## 2019-11-04 LAB
A/G RATIO: 0.8 (ref 1.1–2.2)
ALBUMIN SERPL-MCNC: 3.7 G/DL (ref 3.4–5)
ALP BLD-CCNC: 201 U/L (ref 40–129)
ALT SERPL-CCNC: 46 U/L (ref 10–40)
ANION GAP SERPL CALCULATED.3IONS-SCNC: 12 MMOL/L (ref 3–16)
AST SERPL-CCNC: 74 U/L (ref 15–37)
BASOPHILS ABSOLUTE: 0.1 K/UL (ref 0–0.2)
BASOPHILS RELATIVE PERCENT: 1 %
BILIRUB SERPL-MCNC: 0.7 MG/DL (ref 0–1)
BILIRUBIN URINE: NEGATIVE
BLOOD, URINE: NEGATIVE
BUN BLDV-MCNC: 7 MG/DL (ref 7–20)
CALCIUM SERPL-MCNC: 9.3 MG/DL (ref 8.3–10.6)
CHLORIDE BLD-SCNC: 99 MMOL/L (ref 99–110)
CLARITY: CLEAR
CO2: 28 MMOL/L (ref 21–32)
COLOR: YELLOW
CREAT SERPL-MCNC: 0.6 MG/DL (ref 0.6–1.1)
EOSINOPHILS ABSOLUTE: 0.1 K/UL (ref 0–0.6)
EOSINOPHILS RELATIVE PERCENT: 1.4 %
GFR AFRICAN AMERICAN: >60
GFR NON-AFRICAN AMERICAN: >60
GLOBULIN: 4.4 G/DL
GLUCOSE BLD-MCNC: 107 MG/DL (ref 70–99)
GLUCOSE URINE: 500 MG/DL
HCG QUALITATIVE: NEGATIVE
HCT VFR BLD CALC: 34.4 % (ref 36–48)
HEMOGLOBIN: 11.5 G/DL (ref 12–16)
KETONES, URINE: NEGATIVE MG/DL
LEUKOCYTE ESTERASE, URINE: NEGATIVE
LIPASE: <3 U/L (ref 13–60)
LYMPHOCYTES ABSOLUTE: 4.2 K/UL (ref 1–5.1)
LYMPHOCYTES RELATIVE PERCENT: 40.4 %
MCH RBC QN AUTO: 33.2 PG (ref 26–34)
MCHC RBC AUTO-ENTMCNC: 33.4 G/DL (ref 31–36)
MCV RBC AUTO: 99.3 FL (ref 80–100)
MICROSCOPIC EXAMINATION: ABNORMAL
MONOCYTES ABSOLUTE: 1 K/UL (ref 0–1.3)
MONOCYTES RELATIVE PERCENT: 9.3 %
NEUTROPHILS ABSOLUTE: 5 K/UL (ref 1.7–7.7)
NEUTROPHILS RELATIVE PERCENT: 47.9 %
NITRITE, URINE: NEGATIVE
PDW BLD-RTO: 14.3 % (ref 12.4–15.4)
PH UA: 8 (ref 5–8)
PLATELET # BLD: 235 K/UL (ref 135–450)
PMV BLD AUTO: 9.5 FL (ref 5–10.5)
POTASSIUM SERPL-SCNC: 3.9 MMOL/L (ref 3.5–5.1)
PROTEIN UA: NEGATIVE MG/DL
RBC # BLD: 3.46 M/UL (ref 4–5.2)
SODIUM BLD-SCNC: 139 MMOL/L (ref 136–145)
SPECIFIC GRAVITY UA: 1.02 (ref 1–1.03)
TOTAL PROTEIN: 8.1 G/DL (ref 6.4–8.2)
URINE REFLEX TO CULTURE: ABNORMAL
URINE TYPE: ABNORMAL
UROBILINOGEN, URINE: 1 E.U./DL
WBC # BLD: 10.4 K/UL (ref 4–11)

## 2019-11-04 PROCEDURE — 83036 HEMOGLOBIN GLYCOSYLATED A1C: CPT

## 2019-11-04 PROCEDURE — 6360000002 HC RX W HCPCS: Performed by: NURSE PRACTITIONER

## 2019-11-04 PROCEDURE — 96374 THER/PROPH/DIAG INJ IV PUSH: CPT

## 2019-11-04 PROCEDURE — 2580000003 HC RX 258: Performed by: NURSE PRACTITIONER

## 2019-11-04 PROCEDURE — 6360000004 HC RX CONTRAST MEDICATION: Performed by: NURSE PRACTITIONER

## 2019-11-04 PROCEDURE — 80053 COMPREHEN METABOLIC PANEL: CPT

## 2019-11-04 PROCEDURE — 81003 URINALYSIS AUTO W/O SCOPE: CPT

## 2019-11-04 PROCEDURE — 96375 TX/PRO/DX INJ NEW DRUG ADDON: CPT

## 2019-11-04 PROCEDURE — 85025 COMPLETE CBC W/AUTO DIFF WBC: CPT

## 2019-11-04 PROCEDURE — 83690 ASSAY OF LIPASE: CPT

## 2019-11-04 PROCEDURE — 96361 HYDRATE IV INFUSION ADD-ON: CPT

## 2019-11-04 PROCEDURE — 99285 EMERGENCY DEPT VISIT HI MDM: CPT

## 2019-11-04 PROCEDURE — 6370000000 HC RX 637 (ALT 250 FOR IP): Performed by: NURSE PRACTITIONER

## 2019-11-04 PROCEDURE — 74177 CT ABD & PELVIS W/CONTRAST: CPT

## 2019-11-04 PROCEDURE — 1200000000 HC SEMI PRIVATE

## 2019-11-04 PROCEDURE — 84703 CHORIONIC GONADOTROPIN ASSAY: CPT

## 2019-11-04 RX ORDER — 0.9 % SODIUM CHLORIDE 0.9 %
1000 INTRAVENOUS SOLUTION INTRAVENOUS ONCE
Status: COMPLETED | OUTPATIENT
Start: 2019-11-04 | End: 2019-11-05

## 2019-11-04 RX ORDER — MORPHINE SULFATE 4 MG/ML
4 INJECTION, SOLUTION INTRAMUSCULAR; INTRAVENOUS ONCE
Status: COMPLETED | OUTPATIENT
Start: 2019-11-04 | End: 2019-11-04

## 2019-11-04 RX ORDER — DIPHENHYDRAMINE HCL 25 MG
25 TABLET ORAL ONCE
Status: COMPLETED | OUTPATIENT
Start: 2019-11-04 | End: 2019-11-04

## 2019-11-04 RX ORDER — ONDANSETRON 2 MG/ML
4 INJECTION INTRAMUSCULAR; INTRAVENOUS EVERY 30 MIN PRN
Status: DISCONTINUED | OUTPATIENT
Start: 2019-11-04 | End: 2019-11-05 | Stop reason: HOSPADM

## 2019-11-04 RX ORDER — HYDROMORPHONE HYDROCHLORIDE 1 MG/ML
1 INJECTION, SOLUTION INTRAMUSCULAR; INTRAVENOUS; SUBCUTANEOUS ONCE
Status: COMPLETED | OUTPATIENT
Start: 2019-11-04 | End: 2019-11-04

## 2019-11-04 RX ADMIN — MORPHINE SULFATE 4 MG: 4 INJECTION INTRAVENOUS at 21:35

## 2019-11-04 RX ADMIN — HYDROMORPHONE HYDROCHLORIDE 1 MG: 1 INJECTION, SOLUTION INTRAMUSCULAR; INTRAVENOUS; SUBCUTANEOUS at 22:45

## 2019-11-04 RX ADMIN — ONDANSETRON 4 MG: 2 INJECTION INTRAMUSCULAR; INTRAVENOUS at 21:35

## 2019-11-04 RX ADMIN — IOPAMIDOL 75 ML: 755 INJECTION, SOLUTION INTRAVENOUS at 21:49

## 2019-11-04 RX ADMIN — SODIUM CHLORIDE 1000 ML: 9 INJECTION, SOLUTION INTRAVENOUS at 21:35

## 2019-11-04 RX ADMIN — DIPHENHYDRAMINE HCL 25 MG: 25 TABLET ORAL at 21:35

## 2019-11-04 ASSESSMENT — PAIN SCALES - GENERAL
PAINLEVEL_OUTOF10: 9
PAINLEVEL_OUTOF10: 8
PAINLEVEL_OUTOF10: 7
PAINLEVEL_OUTOF10: 7

## 2019-11-04 ASSESSMENT — PAIN DESCRIPTION - LOCATION: LOCATION: ABDOMEN

## 2019-11-04 ASSESSMENT — ENCOUNTER SYMPTOMS
NAUSEA: 1
DIARRHEA: 0
CHEST TIGHTNESS: 0
ABDOMINAL PAIN: 1
VOMITING: 0
SHORTNESS OF BREATH: 0

## 2019-11-04 ASSESSMENT — PAIN DESCRIPTION - DESCRIPTORS: DESCRIPTORS: CONSTANT;STABBING

## 2019-11-04 ASSESSMENT — PAIN DESCRIPTION - PAIN TYPE: TYPE: ACUTE PAIN

## 2019-11-05 ENCOUNTER — APPOINTMENT (OUTPATIENT)
Dept: GENERAL RADIOLOGY | Age: 34
DRG: 391 | End: 2019-11-05
Payer: MEDICARE

## 2019-11-05 LAB
A/G RATIO: 0.8 (ref 1.1–2.2)
ALBUMIN SERPL-MCNC: 3.2 G/DL (ref 3.4–5)
ALP BLD-CCNC: 170 U/L (ref 40–129)
ALT SERPL-CCNC: 40 U/L (ref 10–40)
ANION GAP SERPL CALCULATED.3IONS-SCNC: 8 MMOL/L (ref 3–16)
AST SERPL-CCNC: 62 U/L (ref 15–37)
BASOPHILS ABSOLUTE: 0 K/UL (ref 0–0.2)
BASOPHILS RELATIVE PERCENT: 0.4 %
BILIRUB SERPL-MCNC: 0.8 MG/DL (ref 0–1)
BUN BLDV-MCNC: 7 MG/DL (ref 7–20)
C-REACTIVE PROTEIN: 1.5 MG/L (ref 0–5.1)
CALCIUM SERPL-MCNC: 8.8 MG/DL (ref 8.3–10.6)
CHLORIDE BLD-SCNC: 104 MMOL/L (ref 99–110)
CO2: 27 MMOL/L (ref 21–32)
CREAT SERPL-MCNC: 0.5 MG/DL (ref 0.6–1.1)
EOSINOPHILS ABSOLUTE: 0.1 K/UL (ref 0–0.6)
EOSINOPHILS RELATIVE PERCENT: 1.4 %
ESTIMATED AVERAGE GLUCOSE: 217.3 MG/DL
GFR AFRICAN AMERICAN: >60
GFR NON-AFRICAN AMERICAN: >60
GLOBULIN: 3.8 G/DL
GLUCOSE BLD-MCNC: 101 MG/DL (ref 70–99)
GLUCOSE BLD-MCNC: 122 MG/DL (ref 70–99)
GLUCOSE BLD-MCNC: 136 MG/DL (ref 70–99)
GLUCOSE BLD-MCNC: 157 MG/DL (ref 70–99)
GLUCOSE BLD-MCNC: 160 MG/DL (ref 70–99)
GLUCOSE BLD-MCNC: 238 MG/DL (ref 70–99)
GLUCOSE BLD-MCNC: 62 MG/DL (ref 70–99)
GLUCOSE BLD-MCNC: 64 MG/DL (ref 70–99)
GLUCOSE BLD-MCNC: 65 MG/DL (ref 70–99)
HBA1C MFR BLD: 9.2 %
HCT VFR BLD CALC: 32.9 % (ref 36–48)
HEMOGLOBIN: 11.2 G/DL (ref 12–16)
LYMPHOCYTES ABSOLUTE: 3.9 K/UL (ref 1–5.1)
LYMPHOCYTES RELATIVE PERCENT: 39.1 %
MAGNESIUM: 1.9 MG/DL (ref 1.8–2.4)
MCH RBC QN AUTO: 33.3 PG (ref 26–34)
MCHC RBC AUTO-ENTMCNC: 34 G/DL (ref 31–36)
MCV RBC AUTO: 98 FL (ref 80–100)
MONOCYTES ABSOLUTE: 0.6 K/UL (ref 0–1.3)
MONOCYTES RELATIVE PERCENT: 6.3 %
NEUTROPHILS ABSOLUTE: 5.3 K/UL (ref 1.7–7.7)
NEUTROPHILS RELATIVE PERCENT: 52.8 %
PDW BLD-RTO: 13.6 % (ref 12.4–15.4)
PERFORMED ON: ABNORMAL
PLATELET # BLD: 222 K/UL (ref 135–450)
PMV BLD AUTO: 9.3 FL (ref 5–10.5)
POTASSIUM SERPL-SCNC: 4 MMOL/L (ref 3.5–5.1)
RBC # BLD: 3.36 M/UL (ref 4–5.2)
SODIUM BLD-SCNC: 139 MMOL/L (ref 136–145)
TOTAL PROTEIN: 7 G/DL (ref 6.4–8.2)
WBC # BLD: 10 K/UL (ref 4–11)

## 2019-11-05 PROCEDURE — 6360000002 HC RX W HCPCS: Performed by: HOSPITALIST

## 2019-11-05 PROCEDURE — 80053 COMPREHEN METABOLIC PANEL: CPT

## 2019-11-05 PROCEDURE — APPSS60 APP SPLIT SHARED TIME 46-60 MINUTES: Performed by: NURSE PRACTITIONER

## 2019-11-05 PROCEDURE — G0008 ADMIN INFLUENZA VIRUS VAC: HCPCS | Performed by: HOSPITALIST

## 2019-11-05 PROCEDURE — 74019 RADEX ABDOMEN 2 VIEWS: CPT

## 2019-11-05 PROCEDURE — 90686 IIV4 VACC NO PRSV 0.5 ML IM: CPT | Performed by: HOSPITALIST

## 2019-11-05 PROCEDURE — 94760 N-INVAS EAR/PLS OXIMETRY 1: CPT

## 2019-11-05 PROCEDURE — 96376 TX/PRO/DX INJ SAME DRUG ADON: CPT

## 2019-11-05 PROCEDURE — 85025 COMPLETE CBC W/AUTO DIFF WBC: CPT

## 2019-11-05 PROCEDURE — 6370000000 HC RX 637 (ALT 250 FOR IP): Performed by: INTERNAL MEDICINE

## 2019-11-05 PROCEDURE — 6360000002 HC RX W HCPCS

## 2019-11-05 PROCEDURE — 2580000003 HC RX 258: Performed by: HOSPITALIST

## 2019-11-05 PROCEDURE — 86140 C-REACTIVE PROTEIN: CPT

## 2019-11-05 PROCEDURE — 99222 1ST HOSP IP/OBS MODERATE 55: CPT | Performed by: SURGERY

## 2019-11-05 PROCEDURE — APPNB30 APP NON BILLABLE TIME 0-30 MINS: Performed by: NURSE PRACTITIONER

## 2019-11-05 PROCEDURE — 1200000000 HC SEMI PRIVATE

## 2019-11-05 PROCEDURE — 2580000003 HC RX 258

## 2019-11-05 PROCEDURE — 82525 ASSAY OF COPPER: CPT

## 2019-11-05 PROCEDURE — 36415 COLL VENOUS BLD VENIPUNCTURE: CPT

## 2019-11-05 PROCEDURE — 6370000000 HC RX 637 (ALT 250 FOR IP): Performed by: HOSPITALIST

## 2019-11-05 PROCEDURE — 83735 ASSAY OF MAGNESIUM: CPT

## 2019-11-05 RX ORDER — SODIUM CHLORIDE 9 MG/ML
INJECTION, SOLUTION INTRAVENOUS CONTINUOUS
Status: DISCONTINUED | OUTPATIENT
Start: 2019-11-05 | End: 2019-11-05

## 2019-11-05 RX ORDER — INSULIN LISPRO 100 [IU]/ML
0-6 INJECTION, SOLUTION INTRAVENOUS; SUBCUTANEOUS EVERY 4 HOURS
Status: DISCONTINUED | OUTPATIENT
Start: 2019-11-05 | End: 2019-11-06

## 2019-11-05 RX ORDER — PEG-3350, SODIUM SULFATE, SODIUM CHLORIDE, POTASSIUM CHLORIDE, SODIUM ASCORBATE AND ASCORBIC ACID 7.5-2.691G
100 KIT ORAL ONCE
Status: COMPLETED | OUTPATIENT
Start: 2019-11-05 | End: 2019-11-05

## 2019-11-05 RX ORDER — POTASSIUM CHLORIDE 7.45 MG/ML
10 INJECTION INTRAVENOUS PRN
Status: DISCONTINUED | OUTPATIENT
Start: 2019-11-05 | End: 2019-11-07 | Stop reason: HOSPADM

## 2019-11-05 RX ORDER — DEXTROSE AND SODIUM CHLORIDE 5; .9 G/100ML; G/100ML
INJECTION, SOLUTION INTRAVENOUS CONTINUOUS
Status: DISCONTINUED | OUTPATIENT
Start: 2019-11-05 | End: 2019-11-06

## 2019-11-05 RX ORDER — LAMOTRIGINE 25 MG/1
75 TABLET ORAL 2 TIMES DAILY
Status: DISCONTINUED | OUTPATIENT
Start: 2019-11-05 | End: 2019-11-07 | Stop reason: HOSPADM

## 2019-11-05 RX ORDER — DEXTROSE MONOHYDRATE 50 MG/ML
100 INJECTION, SOLUTION INTRAVENOUS PRN
Status: DISCONTINUED | OUTPATIENT
Start: 2019-11-05 | End: 2019-11-07 | Stop reason: HOSPADM

## 2019-11-05 RX ORDER — SODIUM CHLORIDE 0.9 % (FLUSH) 0.9 %
10 SYRINGE (ML) INJECTION PRN
Status: DISCONTINUED | OUTPATIENT
Start: 2019-11-05 | End: 2019-11-07 | Stop reason: HOSPADM

## 2019-11-05 RX ORDER — ONDANSETRON 2 MG/ML
4 INJECTION INTRAMUSCULAR; INTRAVENOUS EVERY 6 HOURS PRN
Status: DISCONTINUED | OUTPATIENT
Start: 2019-11-05 | End: 2019-11-07 | Stop reason: HOSPADM

## 2019-11-05 RX ORDER — MORPHINE SULFATE 4 MG/ML
4 INJECTION, SOLUTION INTRAMUSCULAR; INTRAVENOUS EVERY 4 HOURS PRN
Status: DISCONTINUED | OUTPATIENT
Start: 2019-11-05 | End: 2019-11-06

## 2019-11-05 RX ORDER — MORPHINE SULFATE 2 MG/ML
2 INJECTION, SOLUTION INTRAMUSCULAR; INTRAVENOUS EVERY 4 HOURS PRN
Status: DISCONTINUED | OUTPATIENT
Start: 2019-11-05 | End: 2019-11-06

## 2019-11-05 RX ORDER — NICOTINE POLACRILEX 4 MG
15 LOZENGE BUCCAL PRN
Status: DISCONTINUED | OUTPATIENT
Start: 2019-11-05 | End: 2019-11-07 | Stop reason: HOSPADM

## 2019-11-05 RX ORDER — DEXTROSE MONOHYDRATE 25 G/50ML
12.5 INJECTION, SOLUTION INTRAVENOUS PRN
Status: DISCONTINUED | OUTPATIENT
Start: 2019-11-05 | End: 2019-11-07 | Stop reason: HOSPADM

## 2019-11-05 RX ORDER — DEXTROSE AND SODIUM CHLORIDE 5; .9 G/100ML; G/100ML
INJECTION, SOLUTION INTRAVENOUS
Status: COMPLETED
Start: 2019-11-05 | End: 2019-11-05

## 2019-11-05 RX ORDER — DIPHENHYDRAMINE HYDROCHLORIDE 50 MG/ML
12.5 INJECTION INTRAMUSCULAR; INTRAVENOUS EVERY 6 HOURS PRN
Status: DISCONTINUED | OUTPATIENT
Start: 2019-11-05 | End: 2019-11-07 | Stop reason: HOSPADM

## 2019-11-05 RX ORDER — SODIUM CHLORIDE 0.9 % (FLUSH) 0.9 %
10 SYRINGE (ML) INJECTION EVERY 12 HOURS SCHEDULED
Status: DISCONTINUED | OUTPATIENT
Start: 2019-11-05 | End: 2019-11-07 | Stop reason: HOSPADM

## 2019-11-05 RX ORDER — MORPHINE SULFATE 4 MG/ML
INJECTION, SOLUTION INTRAMUSCULAR; INTRAVENOUS
Status: COMPLETED
Start: 2019-11-05 | End: 2019-11-05

## 2019-11-05 RX ORDER — POTASSIUM CHLORIDE 20 MEQ/1
40 TABLET, EXTENDED RELEASE ORAL PRN
Status: DISCONTINUED | OUTPATIENT
Start: 2019-11-05 | End: 2019-11-07 | Stop reason: HOSPADM

## 2019-11-05 RX ORDER — ACETAMINOPHEN 650 MG/1
650 SUPPOSITORY RECTAL EVERY 4 HOURS PRN
Status: DISCONTINUED | OUTPATIENT
Start: 2019-11-05 | End: 2019-11-07 | Stop reason: HOSPADM

## 2019-11-05 RX ORDER — MAGNESIUM SULFATE 1 G/100ML
1 INJECTION INTRAVENOUS PRN
Status: DISCONTINUED | OUTPATIENT
Start: 2019-11-05 | End: 2019-11-07 | Stop reason: HOSPADM

## 2019-11-05 RX ADMIN — DEXTROSE MONOHYDRATE 12.5 G: 500 INJECTION PARENTERAL at 02:11

## 2019-11-05 RX ADMIN — ONDANSETRON 4 MG: 2 INJECTION INTRAMUSCULAR; INTRAVENOUS at 14:40

## 2019-11-05 RX ADMIN — ONDANSETRON 4 MG: 2 INJECTION INTRAMUSCULAR; INTRAVENOUS at 05:36

## 2019-11-05 RX ADMIN — ONDANSETRON 4 MG: 2 INJECTION INTRAMUSCULAR; INTRAVENOUS at 23:54

## 2019-11-05 RX ADMIN — POLYETHYLENE GLYCOL 3350, SODIUM SULFATE, SODIUM CHLORIDE, POTASSIUM CHLORIDE, ASCORBIC ACID, SODIUM ASCORBATE 100 G: KIT at 22:15

## 2019-11-05 RX ADMIN — DIPHENHYDRAMINE HYDROCHLORIDE 12.5 MG: 50 INJECTION, SOLUTION INTRAMUSCULAR; INTRAVENOUS at 19:28

## 2019-11-05 RX ADMIN — INFLUENZA A VIRUS A/BRISBANE/02/2018 IVR-190 (H1N1) ANTIGEN (PROPIOLACTONE INACTIVATED), INFLUENZA A VIRUS A/KANSAS/14/2017 X-327 (H3N2) ANTIGEN (PROPIOLACTONE INACTIVATED), INFLUENZA B VIRUS B/MARYLAND/15/2016 ANTIGEN (PROPIOLACTONE INACTIVATED), INFLUENZA B VIRUS B/PHUKET/3073/2013 BVR-1B ANTIGEN (PROPIOLACTONE INACTIVATED) 0.5 ML: 15; 15; 15; 15 INJECTION, SUSPENSION INTRAMUSCULAR at 11:03

## 2019-11-05 RX ADMIN — SODIUM CHLORIDE: 9 INJECTION, SOLUTION INTRAVENOUS at 02:03

## 2019-11-05 RX ADMIN — DIPHENHYDRAMINE HYDROCHLORIDE 12.5 MG: 50 INJECTION, SOLUTION INTRAMUSCULAR; INTRAVENOUS at 10:26

## 2019-11-05 RX ADMIN — MORPHINE SULFATE 4 MG: 4 INJECTION INTRAVENOUS at 00:31

## 2019-11-05 RX ADMIN — MORPHINE SULFATE 4 MG: 4 INJECTION INTRAVENOUS at 10:26

## 2019-11-05 RX ADMIN — DEXTROSE MONOHYDRATE 12.5 G: 500 INJECTION PARENTERAL at 06:35

## 2019-11-05 RX ADMIN — MORPHINE SULFATE 4 MG: 4 INJECTION INTRAVENOUS at 05:37

## 2019-11-05 RX ADMIN — POLYETHYLENE GLYCOL 3350, SODIUM SULFATE, SODIUM CHLORIDE, POTASSIUM CHLORIDE, ASCORBIC ACID, SODIUM ASCORBATE 100 G: KIT at 16:47

## 2019-11-05 RX ADMIN — ENOXAPARIN SODIUM 40 MG: 40 INJECTION SUBCUTANEOUS at 10:11

## 2019-11-05 RX ADMIN — MORPHINE SULFATE 2 MG: 2 INJECTION, SOLUTION INTRAMUSCULAR; INTRAVENOUS at 19:28

## 2019-11-05 RX ADMIN — DEXTROSE AND SODIUM CHLORIDE 1000 ML: 5; 900 INJECTION, SOLUTION INTRAVENOUS at 06:35

## 2019-11-05 RX ADMIN — INSULIN LISPRO 1 UNITS: 100 INJECTION, SOLUTION INTRAVENOUS; SUBCUTANEOUS at 19:27

## 2019-11-05 RX ADMIN — MORPHINE SULFATE 2 MG: 2 INJECTION, SOLUTION INTRAMUSCULAR; INTRAVENOUS at 14:39

## 2019-11-05 RX ADMIN — LAMOTRIGINE 75 MG: 25 TABLET ORAL at 21:36

## 2019-11-05 RX ADMIN — INSULIN LISPRO 2 UNITS: 100 INJECTION, SOLUTION INTRAVENOUS; SUBCUTANEOUS at 22:15

## 2019-11-05 RX ADMIN — MORPHINE SULFATE 4 MG: 4 INJECTION INTRAVENOUS at 23:55

## 2019-11-05 ASSESSMENT — ENCOUNTER SYMPTOMS
VOMITING: 0
CHEST TIGHTNESS: 0
ABDOMINAL PAIN: 1
EYE DISCHARGE: 0
BACK PAIN: 0
COLOR CHANGE: 0
APNEA: 0
ABDOMINAL DISTENTION: 1
EYE ITCHING: 0

## 2019-11-05 ASSESSMENT — PAIN SCALES - GENERAL
PAINLEVEL_OUTOF10: 7
PAINLEVEL_OUTOF10: 7
PAINLEVEL_OUTOF10: 6
PAINLEVEL_OUTOF10: 7
PAINLEVEL_OUTOF10: 6
PAINLEVEL_OUTOF10: 6
PAINLEVEL_OUTOF10: 9
PAINLEVEL_OUTOF10: 5
PAINLEVEL_OUTOF10: 6
PAINLEVEL_OUTOF10: 7
PAINLEVEL_OUTOF10: 7
PAINLEVEL_OUTOF10: 0
PAINLEVEL_OUTOF10: 7

## 2019-11-05 ASSESSMENT — PAIN DESCRIPTION - ORIENTATION
ORIENTATION: LEFT

## 2019-11-05 ASSESSMENT — PAIN DESCRIPTION - ONSET
ONSET: ON-GOING

## 2019-11-05 ASSESSMENT — PAIN DESCRIPTION - FREQUENCY
FREQUENCY: CONTINUOUS

## 2019-11-05 ASSESSMENT — PAIN DESCRIPTION - PAIN TYPE
TYPE: ACUTE PAIN

## 2019-11-05 ASSESSMENT — PAIN DESCRIPTION - DESCRIPTORS
DESCRIPTORS: ACHING
DESCRIPTORS: SHARP
DESCRIPTORS: ACHING

## 2019-11-05 ASSESSMENT — PAIN DESCRIPTION - DIRECTION
RADIATING_TOWARDS: BACK

## 2019-11-05 ASSESSMENT — PAIN DESCRIPTION - LOCATION
LOCATION: ABDOMEN

## 2019-11-06 ENCOUNTER — ANESTHESIA EVENT (OUTPATIENT)
Dept: ENDOSCOPY | Age: 34
DRG: 391 | End: 2019-11-06
Payer: MEDICARE

## 2019-11-06 ENCOUNTER — ANESTHESIA (OUTPATIENT)
Dept: ENDOSCOPY | Age: 34
DRG: 391 | End: 2019-11-06
Payer: MEDICARE

## 2019-11-06 VITALS
DIASTOLIC BLOOD PRESSURE: 48 MMHG | RESPIRATION RATE: 15 BRPM | OXYGEN SATURATION: 98 % | SYSTOLIC BLOOD PRESSURE: 81 MMHG

## 2019-11-06 LAB
ANION GAP SERPL CALCULATED.3IONS-SCNC: 8 MMOL/L (ref 3–16)
BUN BLDV-MCNC: 5 MG/DL (ref 7–20)
C DIFF TOXIN/ANTIGEN: NORMAL
CALCIUM SERPL-MCNC: 8.3 MG/DL (ref 8.3–10.6)
CHLORIDE BLD-SCNC: 108 MMOL/L (ref 99–110)
CO2: 26 MMOL/L (ref 21–32)
CREAT SERPL-MCNC: <0.5 MG/DL (ref 0.6–1.1)
GFR AFRICAN AMERICAN: >60
GFR NON-AFRICAN AMERICAN: >60
GLUCOSE BLD-MCNC: 139 MG/DL (ref 70–99)
GLUCOSE BLD-MCNC: 146 MG/DL (ref 70–99)
GLUCOSE BLD-MCNC: 182 MG/DL (ref 70–99)
GLUCOSE BLD-MCNC: 182 MG/DL (ref 70–99)
GLUCOSE BLD-MCNC: 211 MG/DL (ref 70–99)
GLUCOSE BLD-MCNC: 246 MG/DL (ref 70–99)
GLUCOSE BLD-MCNC: 347 MG/DL (ref 70–99)
GLUCOSE BLD-MCNC: 350 MG/DL (ref 70–99)
HCG(URINE) PREGNANCY TEST: NEGATIVE
HCT VFR BLD CALC: 31.5 % (ref 36–48)
HEMOGLOBIN: 10.5 G/DL (ref 12–16)
MAGNESIUM: 1.7 MG/DL (ref 1.8–2.4)
MCH RBC QN AUTO: 33.3 PG (ref 26–34)
MCHC RBC AUTO-ENTMCNC: 33.4 G/DL (ref 31–36)
MCV RBC AUTO: 99.7 FL (ref 80–100)
PDW BLD-RTO: 13.5 % (ref 12.4–15.4)
PERFORMED ON: ABNORMAL
PHOSPHORUS: 2.6 MG/DL (ref 2.5–4.9)
PLATELET # BLD: 207 K/UL (ref 135–450)
PMV BLD AUTO: 9.6 FL (ref 5–10.5)
POTASSIUM SERPL-SCNC: 3.6 MMOL/L (ref 3.5–5.1)
RBC # BLD: 3.16 M/UL (ref 4–5.2)
SODIUM BLD-SCNC: 142 MMOL/L (ref 136–145)
WBC # BLD: 9 K/UL (ref 4–11)

## 2019-11-06 PROCEDURE — 3609027000 HC COLONOSCOPY: Performed by: INTERNAL MEDICINE

## 2019-11-06 PROCEDURE — 3700000000 HC ANESTHESIA ATTENDED CARE: Performed by: INTERNAL MEDICINE

## 2019-11-06 PROCEDURE — 2709999900 HC NON-CHARGEABLE SUPPLY: Performed by: INTERNAL MEDICINE

## 2019-11-06 PROCEDURE — 80048 BASIC METABOLIC PNL TOTAL CA: CPT

## 2019-11-06 PROCEDURE — APPNB30 APP NON BILLABLE TIME 0-30 MINS: Performed by: NURSE PRACTITIONER

## 2019-11-06 PROCEDURE — 7100000001 HC PACU RECOVERY - ADDTL 15 MIN: Performed by: INTERNAL MEDICINE

## 2019-11-06 PROCEDURE — 3609013900 HC ENTEROSCOPY: Performed by: INTERNAL MEDICINE

## 2019-11-06 PROCEDURE — 3700000001 HC ADD 15 MINUTES (ANESTHESIA): Performed by: INTERNAL MEDICINE

## 2019-11-06 PROCEDURE — 6360000002 HC RX W HCPCS: Performed by: HOSPITALIST

## 2019-11-06 PROCEDURE — 83735 ASSAY OF MAGNESIUM: CPT

## 2019-11-06 PROCEDURE — 2580000003 HC RX 258: Performed by: HOSPITALIST

## 2019-11-06 PROCEDURE — 6370000000 HC RX 637 (ALT 250 FOR IP): Performed by: INTERNAL MEDICINE

## 2019-11-06 PROCEDURE — 6360000002 HC RX W HCPCS: Performed by: INTERNAL MEDICINE

## 2019-11-06 PROCEDURE — 6360000002 HC RX W HCPCS: Performed by: NURSE ANESTHETIST, CERTIFIED REGISTERED

## 2019-11-06 PROCEDURE — 0DJD8ZZ INSPECTION OF LOWER INTESTINAL TRACT, VIA NATURAL OR ARTIFICIAL OPENING ENDOSCOPIC: ICD-10-PCS | Performed by: INTERNAL MEDICINE

## 2019-11-06 PROCEDURE — 87449 NOS EACH ORGANISM AG IA: CPT

## 2019-11-06 PROCEDURE — 1200000000 HC SEMI PRIVATE

## 2019-11-06 PROCEDURE — 0DJ08ZZ INSPECTION OF UPPER INTESTINAL TRACT, VIA NATURAL OR ARTIFICIAL OPENING ENDOSCOPIC: ICD-10-PCS | Performed by: INTERNAL MEDICINE

## 2019-11-06 PROCEDURE — 2500000003 HC RX 250 WO HCPCS: Performed by: NURSE ANESTHETIST, CERTIFIED REGISTERED

## 2019-11-06 PROCEDURE — 2580000003 HC RX 258: Performed by: INTERNAL MEDICINE

## 2019-11-06 PROCEDURE — 84703 CHORIONIC GONADOTROPIN ASSAY: CPT

## 2019-11-06 PROCEDURE — 87324 CLOSTRIDIUM AG IA: CPT

## 2019-11-06 PROCEDURE — 6370000000 HC RX 637 (ALT 250 FOR IP): Performed by: HOSPITALIST

## 2019-11-06 PROCEDURE — 99232 SBSQ HOSP IP/OBS MODERATE 35: CPT | Performed by: SURGERY

## 2019-11-06 PROCEDURE — 84100 ASSAY OF PHOSPHORUS: CPT

## 2019-11-06 PROCEDURE — 85027 COMPLETE CBC AUTOMATED: CPT

## 2019-11-06 PROCEDURE — 7100000000 HC PACU RECOVERY - FIRST 15 MIN: Performed by: INTERNAL MEDICINE

## 2019-11-06 PROCEDURE — 2580000003 HC RX 258: Performed by: NURSE ANESTHETIST, CERTIFIED REGISTERED

## 2019-11-06 PROCEDURE — 6370000000 HC RX 637 (ALT 250 FOR IP): Performed by: NURSE PRACTITIONER

## 2019-11-06 RX ORDER — SODIUM CHLORIDE 9 MG/ML
INJECTION, SOLUTION INTRAVENOUS CONTINUOUS
Status: DISCONTINUED | OUTPATIENT
Start: 2019-11-06 | End: 2019-11-07 | Stop reason: HOSPADM

## 2019-11-06 RX ORDER — INSULIN LISPRO 100 [IU]/ML
0-12 INJECTION, SOLUTION INTRAVENOUS; SUBCUTANEOUS
Status: DISCONTINUED | OUTPATIENT
Start: 2019-11-07 | End: 2019-11-07 | Stop reason: HOSPADM

## 2019-11-06 RX ORDER — INSULIN LISPRO 100 [IU]/ML
0-6 INJECTION, SOLUTION INTRAVENOUS; SUBCUTANEOUS NIGHTLY
Status: DISCONTINUED | OUTPATIENT
Start: 2019-11-06 | End: 2019-11-07 | Stop reason: HOSPADM

## 2019-11-06 RX ORDER — METHADONE HYDROCHLORIDE 10 MG/1
10 TABLET ORAL 2 TIMES DAILY
Status: DISCONTINUED | OUTPATIENT
Start: 2019-11-06 | End: 2019-11-07 | Stop reason: HOSPADM

## 2019-11-06 RX ORDER — SODIUM CHLORIDE 9 MG/ML
INJECTION, SOLUTION INTRAVENOUS CONTINUOUS PRN
Status: DISCONTINUED | OUTPATIENT
Start: 2019-11-06 | End: 2019-11-06 | Stop reason: SDUPTHER

## 2019-11-06 RX ORDER — MIDAZOLAM HYDROCHLORIDE 1 MG/ML
INJECTION INTRAMUSCULAR; INTRAVENOUS PRN
Status: DISCONTINUED | OUTPATIENT
Start: 2019-11-06 | End: 2019-11-06 | Stop reason: SDUPTHER

## 2019-11-06 RX ORDER — PROPOFOL 10 MG/ML
INJECTION, EMULSION INTRAVENOUS CONTINUOUS PRN
Status: DISCONTINUED | OUTPATIENT
Start: 2019-11-06 | End: 2019-11-06 | Stop reason: SDUPTHER

## 2019-11-06 RX ORDER — MAGNESIUM SULFATE 1 G/100ML
1 INJECTION INTRAVENOUS ONCE
Status: COMPLETED | OUTPATIENT
Start: 2019-11-06 | End: 2019-11-06

## 2019-11-06 RX ORDER — INSULIN LISPRO 100 [IU]/ML
0-6 INJECTION, SOLUTION INTRAVENOUS; SUBCUTANEOUS
Status: DISCONTINUED | OUTPATIENT
Start: 2019-11-07 | End: 2019-11-06

## 2019-11-06 RX ORDER — PROPOFOL 10 MG/ML
INJECTION, EMULSION INTRAVENOUS PRN
Status: DISCONTINUED | OUTPATIENT
Start: 2019-11-06 | End: 2019-11-06 | Stop reason: SDUPTHER

## 2019-11-06 RX ORDER — EPHEDRINE SULFATE/0.9% NACL/PF 50 MG/5 ML
SYRINGE (ML) INTRAVENOUS PRN
Status: DISCONTINUED | OUTPATIENT
Start: 2019-11-06 | End: 2019-11-06 | Stop reason: SDUPTHER

## 2019-11-06 RX ADMIN — INSULIN LISPRO 1 UNITS: 100 INJECTION, SOLUTION INTRAVENOUS; SUBCUTANEOUS at 11:20

## 2019-11-06 RX ADMIN — INSULIN LISPRO 4 UNITS: 100 INJECTION, SOLUTION INTRAVENOUS; SUBCUTANEOUS at 14:54

## 2019-11-06 RX ADMIN — Medication 5 MG: at 08:30

## 2019-11-06 RX ADMIN — DIPHENHYDRAMINE HYDROCHLORIDE 12.5 MG: 50 INJECTION, SOLUTION INTRAMUSCULAR; INTRAVENOUS at 06:05

## 2019-11-06 RX ADMIN — MAGNESIUM SULFATE HEPTAHYDRATE 1 G: 1 INJECTION, SOLUTION INTRAVENOUS at 09:55

## 2019-11-06 RX ADMIN — LAMOTRIGINE 75 MG: 25 TABLET ORAL at 20:27

## 2019-11-06 RX ADMIN — DIPHENHYDRAMINE HYDROCHLORIDE 12.5 MG: 50 INJECTION, SOLUTION INTRAMUSCULAR; INTRAVENOUS at 20:32

## 2019-11-06 RX ADMIN — INSULIN LISPRO 5 UNITS: 100 INJECTION, SOLUTION INTRAVENOUS; SUBCUTANEOUS at 20:48

## 2019-11-06 RX ADMIN — ONDANSETRON 4 MG: 2 INJECTION INTRAMUSCULAR; INTRAVENOUS at 20:32

## 2019-11-06 RX ADMIN — INSULIN GLARGINE 5 UNITS: 100 INJECTION, SOLUTION SUBCUTANEOUS at 20:49

## 2019-11-06 RX ADMIN — INSULIN LISPRO 2 UNITS: 100 INJECTION, SOLUTION INTRAVENOUS; SUBCUTANEOUS at 18:00

## 2019-11-06 RX ADMIN — SODIUM CHLORIDE: 9 INJECTION, SOLUTION INTRAVENOUS at 07:44

## 2019-11-06 RX ADMIN — PROPOFOL 140 MCG/KG/MIN: 10 INJECTION, EMULSION INTRAVENOUS at 07:56

## 2019-11-06 RX ADMIN — MORPHINE SULFATE 4 MG: 4 INJECTION INTRAVENOUS at 06:06

## 2019-11-06 RX ADMIN — DEXTROSE AND SODIUM CHLORIDE: 5; 900 INJECTION, SOLUTION INTRAVENOUS at 01:03

## 2019-11-06 RX ADMIN — Medication 5 MG: at 08:18

## 2019-11-06 RX ADMIN — METHADONE HYDROCHLORIDE 10 MG: 10 TABLET ORAL at 14:56

## 2019-11-06 RX ADMIN — ONDANSETRON 4 MG: 2 INJECTION INTRAMUSCULAR; INTRAVENOUS at 06:07

## 2019-11-06 RX ADMIN — PROPOFOL 60 MG: 10 INJECTION, EMULSION INTRAVENOUS at 07:55

## 2019-11-06 RX ADMIN — Medication 5 MG: at 08:00

## 2019-11-06 RX ADMIN — SODIUM CHLORIDE: 9 INJECTION, SOLUTION INTRAVENOUS at 07:35

## 2019-11-06 RX ADMIN — INSULIN LISPRO 1 UNITS: 100 INJECTION, SOLUTION INTRAVENOUS; SUBCUTANEOUS at 06:08

## 2019-11-06 RX ADMIN — METHADONE HYDROCHLORIDE 10 MG: 10 TABLET ORAL at 20:27

## 2019-11-06 RX ADMIN — INSULIN LISPRO 2 UNITS: 100 INJECTION, SOLUTION INTRAVENOUS; SUBCUTANEOUS at 02:11

## 2019-11-06 RX ADMIN — MIDAZOLAM 1 MG: 1 INJECTION INTRAMUSCULAR; INTRAVENOUS at 07:54

## 2019-11-06 ASSESSMENT — PAIN DESCRIPTION - ONSET
ONSET: ON-GOING
ONSET: ON-GOING

## 2019-11-06 ASSESSMENT — PULMONARY FUNCTION TESTS
PIF_VALUE: 1
PIF_VALUE: 4
PIF_VALUE: 1
PIF_VALUE: 2
PIF_VALUE: 1
PIF_VALUE: 2
PIF_VALUE: 1

## 2019-11-06 ASSESSMENT — PAIN SCALES - GENERAL
PAINLEVEL_OUTOF10: 7
PAINLEVEL_OUTOF10: 8
PAINLEVEL_OUTOF10: 7
PAINLEVEL_OUTOF10: 7

## 2019-11-06 ASSESSMENT — PAIN DESCRIPTION - PAIN TYPE
TYPE: ACUTE PAIN

## 2019-11-06 ASSESSMENT — PAIN DESCRIPTION - FREQUENCY
FREQUENCY: CONTINUOUS
FREQUENCY: CONTINUOUS

## 2019-11-06 ASSESSMENT — PAIN - FUNCTIONAL ASSESSMENT: PAIN_FUNCTIONAL_ASSESSMENT: 0-10

## 2019-11-06 ASSESSMENT — PAIN DESCRIPTION - ORIENTATION
ORIENTATION: LEFT

## 2019-11-06 ASSESSMENT — PAIN DESCRIPTION - DESCRIPTORS
DESCRIPTORS: ACHING
DESCRIPTORS: ACHING

## 2019-11-06 ASSESSMENT — PAIN DESCRIPTION - DIRECTION: RADIATING_TOWARDS: BACK

## 2019-11-06 ASSESSMENT — PAIN DESCRIPTION - LOCATION
LOCATION: ABDOMEN

## 2019-11-07 VITALS
RESPIRATION RATE: 16 BRPM | SYSTOLIC BLOOD PRESSURE: 115 MMHG | HEART RATE: 67 BPM | TEMPERATURE: 98.7 F | OXYGEN SATURATION: 96 % | WEIGHT: 130.3 LBS | HEIGHT: 66 IN | DIASTOLIC BLOOD PRESSURE: 77 MMHG | BODY MASS INDEX: 20.94 KG/M2

## 2019-11-07 LAB
GLUCOSE BLD-MCNC: 76 MG/DL (ref 70–99)
GLUCOSE BLD-MCNC: 80 MG/DL (ref 70–99)
PERFORMED ON: NORMAL
PERFORMED ON: NORMAL

## 2019-11-07 PROCEDURE — 6360000002 HC RX W HCPCS: Performed by: INTERNAL MEDICINE

## 2019-11-07 PROCEDURE — 94760 N-INVAS EAR/PLS OXIMETRY 1: CPT

## 2019-11-07 PROCEDURE — 6370000000 HC RX 637 (ALT 250 FOR IP): Performed by: INTERNAL MEDICINE

## 2019-11-07 PROCEDURE — 6370000000 HC RX 637 (ALT 250 FOR IP): Performed by: HOSPITALIST

## 2019-11-07 RX ADMIN — LAMOTRIGINE 75 MG: 25 TABLET ORAL at 08:27

## 2019-11-07 RX ADMIN — DIPHENHYDRAMINE HYDROCHLORIDE 12.5 MG: 50 INJECTION, SOLUTION INTRAMUSCULAR; INTRAVENOUS at 05:44

## 2019-11-07 RX ADMIN — METHADONE HYDROCHLORIDE 10 MG: 10 TABLET ORAL at 08:27

## 2019-11-07 RX ADMIN — ONDANSETRON 4 MG: 2 INJECTION INTRAMUSCULAR; INTRAVENOUS at 05:44

## 2019-11-07 ASSESSMENT — PAIN SCALES - GENERAL
PAINLEVEL_OUTOF10: 6
PAINLEVEL_OUTOF10: 7

## 2019-11-07 ASSESSMENT — PAIN DESCRIPTION - ORIENTATION: ORIENTATION: LEFT

## 2019-11-07 ASSESSMENT — PAIN DESCRIPTION - PAIN TYPE: TYPE: ACUTE PAIN

## 2019-11-07 ASSESSMENT — PAIN DESCRIPTION - LOCATION: LOCATION: ABDOMEN

## 2019-11-09 LAB — COPPER: 89.8 UG/DL (ref 80–155)

## 2020-03-23 ENCOUNTER — HOSPITAL ENCOUNTER (OUTPATIENT)
Age: 35
Discharge: HOME | End: 2020-03-23
Payer: MEDICARE

## 2020-03-23 LAB
ANION GAP SERPL CALCULATED.3IONS-SCNC: 10 MMOL/L (ref 3–16)
BUN BLDV-MCNC: 6 MG/DL (ref 7–20)
CALCIUM SERPL-MCNC: 8.7 MG/DL (ref 8.3–10.6)
CHLORIDE BLD-SCNC: 95 MMOL/L (ref 99–110)
CO2: 30 MMOL/L (ref 21–32)
CREAT SERPL-MCNC: 0.7 MG/DL (ref 0.6–1.1)
GFR AFRICAN AMERICAN: >60
GFR NON-AFRICAN AMERICAN: >60
GLUCOSE BLD-MCNC: 221 MG/DL (ref 70–99)
MAGNESIUM: 2 MG/DL (ref 1.8–2.4)
POTASSIUM SERPL-SCNC: 4.6 MMOL/L (ref 3.5–5.1)
SODIUM BLD-SCNC: 135 MMOL/L (ref 136–145)

## 2020-03-23 PROCEDURE — 80048 BASIC METABOLIC PNL TOTAL CA: CPT

## 2020-03-23 PROCEDURE — 83735 ASSAY OF MAGNESIUM: CPT

## 2020-03-23 PROCEDURE — 36415 COLL VENOUS BLD VENIPUNCTURE: CPT

## 2020-03-30 ENCOUNTER — HOSPITAL ENCOUNTER (OUTPATIENT)
Age: 35
Discharge: HOME | End: 2020-03-30
Payer: MEDICARE

## 2020-03-30 LAB
ALBUMIN SERPL-MCNC: 2.3 G/DL (ref 3.4–5)
ANION GAP SERPL CALCULATED.3IONS-SCNC: 8 MMOL/L (ref 3–16)
BASOPHILS ABSOLUTE: 0.1 K/UL (ref 0–0.2)
BASOPHILS RELATIVE PERCENT: 1 %
BUN BLDV-MCNC: 11 MG/DL (ref 7–20)
CALCIUM SERPL-MCNC: 7.9 MG/DL (ref 8.3–10.6)
CHLORIDE BLD-SCNC: 97 MMOL/L (ref 99–110)
CO2: 27 MMOL/L (ref 21–32)
CREAT SERPL-MCNC: 0.6 MG/DL (ref 0.6–1.1)
EOSINOPHILS ABSOLUTE: 0 K/UL (ref 0–0.6)
EOSINOPHILS RELATIVE PERCENT: 0.4 %
GFR AFRICAN AMERICAN: >60
GFR NON-AFRICAN AMERICAN: >60
GLUCOSE BLD-MCNC: 259 MG/DL (ref 70–99)
HCT VFR BLD CALC: 27.7 % (ref 36–48)
HEMOGLOBIN: 9.4 G/DL (ref 12–16)
LYMPHOCYTES ABSOLUTE: 3.5 K/UL (ref 1–5.1)
LYMPHOCYTES RELATIVE PERCENT: 41 %
MCH RBC QN AUTO: 33.2 PG (ref 26–34)
MCHC RBC AUTO-ENTMCNC: 33.7 G/DL (ref 31–36)
MCV RBC AUTO: 98.4 FL (ref 80–100)
MONOCYTES ABSOLUTE: 0.9 K/UL (ref 0–1.3)
MONOCYTES RELATIVE PERCENT: 10.4 %
NEUTROPHILS ABSOLUTE: 4 K/UL (ref 1.7–7.7)
NEUTROPHILS RELATIVE PERCENT: 47.2 %
PDW BLD-RTO: 16.8 % (ref 12.4–15.4)
PHOSPHORUS: 2.6 MG/DL (ref 2.5–4.9)
PLATELET # BLD: 267 K/UL (ref 135–450)
PMV BLD AUTO: 9.7 FL (ref 5–10.5)
POTASSIUM SERPL-SCNC: 4.3 MMOL/L (ref 3.5–5.1)
RBC # BLD: 2.82 M/UL (ref 4–5.2)
SODIUM BLD-SCNC: 132 MMOL/L (ref 136–145)
WBC # BLD: 8.6 K/UL (ref 4–11)

## 2020-03-30 PROCEDURE — 85025 COMPLETE CBC W/AUTO DIFF WBC: CPT

## 2020-03-30 PROCEDURE — 80069 RENAL FUNCTION PANEL: CPT

## 2020-03-30 PROCEDURE — 86708 HEPATITIS A ANTIBODY: CPT

## 2020-03-30 PROCEDURE — 36415 COLL VENOUS BLD VENIPUNCTURE: CPT

## 2020-03-31 LAB — HAV AB SERPL IA-ACNC: NEGATIVE

## 2020-04-06 ENCOUNTER — HOSPITAL ENCOUNTER (OUTPATIENT)
Age: 35
Discharge: HOME | End: 2020-04-06
Payer: MEDICARE

## 2020-04-06 LAB
A/G RATIO: 0.5 (ref 1.1–2.2)
ALBUMIN SERPL-MCNC: 2.4 G/DL (ref 3.4–5)
ALP BLD-CCNC: 195 U/L (ref 40–129)
ALT SERPL-CCNC: 29 U/L (ref 10–40)
ANION GAP SERPL CALCULATED.3IONS-SCNC: 14 MMOL/L (ref 3–16)
AST SERPL-CCNC: 52 U/L (ref 15–37)
BASOPHILS ABSOLUTE: 0.1 K/UL (ref 0–0.2)
BASOPHILS RELATIVE PERCENT: 1.5 %
BILIRUB SERPL-MCNC: 1.7 MG/DL (ref 0–1)
BUN BLDV-MCNC: 9 MG/DL (ref 7–20)
CALCIUM SERPL-MCNC: 8.6 MG/DL (ref 8.3–10.6)
CHLORIDE BLD-SCNC: 100 MMOL/L (ref 99–110)
CO2: 24 MMOL/L (ref 21–32)
CREAT SERPL-MCNC: 0.8 MG/DL (ref 0.6–1.1)
EOSINOPHILS ABSOLUTE: 0.1 K/UL (ref 0–0.6)
EOSINOPHILS RELATIVE PERCENT: 1.1 %
FERRITIN: 118.2 NG/ML (ref 15–150)
GFR AFRICAN AMERICAN: >60
GFR NON-AFRICAN AMERICAN: >60
GLOBULIN: 5 G/DL
GLUCOSE BLD-MCNC: 111 MG/DL (ref 70–99)
HCT VFR BLD CALC: 27.9 % (ref 36–48)
HEMOGLOBIN: 9.5 G/DL (ref 12–16)
IRON SATURATION: 47 % (ref 15–50)
IRON: 46 UG/DL (ref 37–145)
LYMPHOCYTES ABSOLUTE: 4.4 K/UL (ref 1–5.1)
LYMPHOCYTES RELATIVE PERCENT: 44.9 %
MCH RBC QN AUTO: 33.7 PG (ref 26–34)
MCHC RBC AUTO-ENTMCNC: 34 G/DL (ref 31–36)
MCV RBC AUTO: 98.8 FL (ref 80–100)
MONOCYTES ABSOLUTE: 1.2 K/UL (ref 0–1.3)
MONOCYTES RELATIVE PERCENT: 12.7 %
NEUTROPHILS ABSOLUTE: 3.9 K/UL (ref 1.7–7.7)
NEUTROPHILS RELATIVE PERCENT: 39.8 %
PDW BLD-RTO: 17.2 % (ref 12.4–15.4)
PLATELET # BLD: 282 K/UL (ref 135–450)
PMV BLD AUTO: 9.8 FL (ref 5–10.5)
POTASSIUM SERPL-SCNC: 4.4 MMOL/L (ref 3.5–5.1)
RBC # BLD: 2.82 M/UL (ref 4–5.2)
SODIUM BLD-SCNC: 138 MMOL/L (ref 136–145)
TOTAL IRON BINDING CAPACITY: 98 UG/DL (ref 260–445)
TOTAL PROTEIN: 7.4 G/DL (ref 6.4–8.2)
WBC # BLD: 9.7 K/UL (ref 4–11)

## 2020-04-06 PROCEDURE — 83540 ASSAY OF IRON: CPT

## 2020-04-06 PROCEDURE — 83550 IRON BINDING TEST: CPT

## 2020-04-06 PROCEDURE — 80053 COMPREHEN METABOLIC PANEL: CPT

## 2020-04-06 PROCEDURE — 85025 COMPLETE CBC W/AUTO DIFF WBC: CPT

## 2020-04-06 PROCEDURE — 36415 COLL VENOUS BLD VENIPUNCTURE: CPT

## 2020-04-06 PROCEDURE — 82728 ASSAY OF FERRITIN: CPT

## 2020-04-07 RX ORDER — SODIUM CHLORIDE 0.9 % (FLUSH) 0.9 %
30 SYRINGE (ML) INJECTION PRN
Status: CANCELLED | OUTPATIENT
Start: 2020-05-01

## 2020-04-29 ENCOUNTER — ANESTHESIA (OUTPATIENT)
Dept: ENDOSCOPY | Age: 35
End: 2020-04-29
Payer: MEDICARE

## 2020-04-29 ENCOUNTER — HOSPITAL ENCOUNTER (OUTPATIENT)
Age: 35
Setting detail: OUTPATIENT SURGERY
Discharge: HOME | End: 2020-04-29
Attending: INTERNAL MEDICINE | Admitting: INTERNAL MEDICINE
Payer: MEDICARE

## 2020-04-29 ENCOUNTER — ANESTHESIA EVENT (OUTPATIENT)
Dept: ENDOSCOPY | Age: 35
End: 2020-04-29
Payer: MEDICARE

## 2020-04-29 VITALS
HEART RATE: 89 BPM | BODY MASS INDEX: 17.36 KG/M2 | HEIGHT: 66 IN | OXYGEN SATURATION: 97 % | WEIGHT: 108 LBS | SYSTOLIC BLOOD PRESSURE: 101 MMHG | TEMPERATURE: 97.7 F | RESPIRATION RATE: 15 BRPM | DIASTOLIC BLOOD PRESSURE: 62 MMHG

## 2020-04-29 VITALS
DIASTOLIC BLOOD PRESSURE: 46 MMHG | SYSTOLIC BLOOD PRESSURE: 83 MMHG | OXYGEN SATURATION: 100 % | RESPIRATION RATE: 11 BRPM | TEMPERATURE: 96.8 F

## 2020-04-29 LAB
BASOPHILS ABSOLUTE: 0.1 K/UL (ref 0–0.2)
BASOPHILS RELATIVE PERCENT: 1.9 %
EOSINOPHILS ABSOLUTE: 0.1 K/UL (ref 0–0.6)
EOSINOPHILS RELATIVE PERCENT: 0.8 %
GLUCOSE BLD-MCNC: 222 MG/DL (ref 70–99)
GLUCOSE BLD-MCNC: 230 MG/DL (ref 70–99)
HCG(URINE) PREGNANCY TEST: NEGATIVE
HCT VFR BLD CALC: 20 % (ref 36–48)
HEMOGLOBIN: 6.8 G/DL (ref 12–16)
LYMPHOCYTES ABSOLUTE: 2.4 K/UL (ref 1–5.1)
LYMPHOCYTES RELATIVE PERCENT: 31.6 %
MCH RBC QN AUTO: 33.7 PG (ref 26–34)
MCHC RBC AUTO-ENTMCNC: 34.3 G/DL (ref 31–36)
MCV RBC AUTO: 98.1 FL (ref 80–100)
MONOCYTES ABSOLUTE: 0.6 K/UL (ref 0–1.3)
MONOCYTES RELATIVE PERCENT: 8.1 %
NEUTROPHILS ABSOLUTE: 4.3 K/UL (ref 1.7–7.7)
NEUTROPHILS RELATIVE PERCENT: 57.6 %
PDW BLD-RTO: 15.5 % (ref 12.4–15.4)
PERFORMED ON: ABNORMAL
PERFORMED ON: ABNORMAL
PLATELET # BLD: 189 K/UL (ref 135–450)
PMV BLD AUTO: 10.2 FL (ref 5–10.5)
RBC # BLD: 2.03 M/UL (ref 4–5.2)
WBC # BLD: 7.5 K/UL (ref 4–11)

## 2020-04-29 PROCEDURE — 7100000011 HC PHASE II RECOVERY - ADDTL 15 MIN: Performed by: INTERNAL MEDICINE

## 2020-04-29 PROCEDURE — 7100000000 HC PACU RECOVERY - FIRST 15 MIN: Performed by: INTERNAL MEDICINE

## 2020-04-29 PROCEDURE — 7100000010 HC PHASE II RECOVERY - FIRST 15 MIN: Performed by: INTERNAL MEDICINE

## 2020-04-29 PROCEDURE — 2709999900 HC NON-CHARGEABLE SUPPLY: Performed by: INTERNAL MEDICINE

## 2020-04-29 PROCEDURE — 3609008400 HC SIGMOIDOSCOPY DIAGNOSTIC: Performed by: INTERNAL MEDICINE

## 2020-04-29 PROCEDURE — 6360000002 HC RX W HCPCS: Performed by: NURSE ANESTHETIST, CERTIFIED REGISTERED

## 2020-04-29 PROCEDURE — 6360000002 HC RX W HCPCS: Performed by: INTERNAL MEDICINE

## 2020-04-29 PROCEDURE — 84703 CHORIONIC GONADOTROPIN ASSAY: CPT

## 2020-04-29 PROCEDURE — 36415 COLL VENOUS BLD VENIPUNCTURE: CPT

## 2020-04-29 PROCEDURE — 3700000000 HC ANESTHESIA ATTENDED CARE: Performed by: INTERNAL MEDICINE

## 2020-04-29 PROCEDURE — 85025 COMPLETE CBC W/AUTO DIFF WBC: CPT

## 2020-04-29 PROCEDURE — 7100000001 HC PACU RECOVERY - ADDTL 15 MIN: Performed by: INTERNAL MEDICINE

## 2020-04-29 PROCEDURE — 2580000003 HC RX 258: Performed by: NURSE ANESTHETIST, CERTIFIED REGISTERED

## 2020-04-29 RX ORDER — SODIUM CHLORIDE 9 MG/ML
INJECTION, SOLUTION INTRAVENOUS CONTINUOUS PRN
Status: DISCONTINUED | OUTPATIENT
Start: 2020-04-29 | End: 2020-04-29 | Stop reason: SDUPTHER

## 2020-04-29 RX ORDER — SPIRONOLACTONE 25 MG/1
25 TABLET ORAL DAILY
COMMUNITY

## 2020-04-29 RX ORDER — SODIUM CHLORIDE 9 MG/ML
INJECTION, SOLUTION INTRAVENOUS CONTINUOUS
Status: DISCONTINUED | OUTPATIENT
Start: 2020-04-29 | End: 2020-04-29 | Stop reason: HOSPADM

## 2020-04-29 RX ORDER — PROPOFOL 10 MG/ML
INJECTION, EMULSION INTRAVENOUS PRN
Status: DISCONTINUED | OUTPATIENT
Start: 2020-04-29 | End: 2020-04-29 | Stop reason: SDUPTHER

## 2020-04-29 RX ORDER — HEPARIN SODIUM (PORCINE) LOCK FLUSH IV SOLN 100 UNIT/ML 100 UNIT/ML
100 SOLUTION INTRAVENOUS PRN
Status: DISCONTINUED | OUTPATIENT
Start: 2020-04-29 | End: 2020-04-29 | Stop reason: HOSPADM

## 2020-04-29 RX ADMIN — PROPOFOL 20 MG: 10 INJECTION, EMULSION INTRAVENOUS at 12:46

## 2020-04-29 RX ADMIN — SODIUM CHLORIDE: 9 INJECTION, SOLUTION INTRAVENOUS at 12:37

## 2020-04-29 RX ADMIN — HEPARIN SODIUM (PORCINE) LOCK FLUSH IV SOLN 100 UNIT/ML: 100 SOLUTION at 13:16

## 2020-04-29 RX ADMIN — PROPOFOL 30 MG: 10 INJECTION, EMULSION INTRAVENOUS at 12:44

## 2020-04-29 RX ADMIN — PROPOFOL 50 MG: 10 INJECTION, EMULSION INTRAVENOUS at 12:40

## 2020-04-29 RX ADMIN — PROPOFOL 20 MG: 10 INJECTION, EMULSION INTRAVENOUS at 12:42

## 2020-04-29 RX ADMIN — PROPOFOL 30 MG: 10 INJECTION, EMULSION INTRAVENOUS at 12:48

## 2020-04-29 ASSESSMENT — LIFESTYLE VARIABLES: SMOKING_STATUS: 0

## 2020-04-29 NOTE — PROGRESS NOTES
CBC sent as ordered by Dr Karsten Alexander. Port flushed with 5ml and heparin and de accessed and bandaid applied.  Mom called for

## 2020-04-29 NOTE — H&P
[address] [place] [address] [place]    Pre-operative History and Physical    Patient: Bobby Boykin  : 1985  CSN:     History Obtained From:   Patient or guardian. HISTORY OF PRESENT ILLNESS:    The patient is a 29 y.o. female here for rectal bleeding.       Past Medical History:    Past Medical History:   Diagnosis Date    Abdominal pain     with nausea and weight loss    Amenorrhea     Carbapenem-resistant bacterial infection 2017    urine    Chronic pancreatitis (HCC)     R/T CFTR MUTATION    Diabetes mellitus ([facility]     since pancreas removed    Digestive disorder     Gastroparesis     Hepatic steatosis     History of blood transfusion 2017    MARIANA (iron deficiency anemia)     Kidney stone     Kidney stone 2017    PICC (peripherally inserted central catheter) in place     NOT CURRENT 3-2-18    Seizure ([facility]     ONE EPISODE IN      Past Surgical History:    Past Surgical History:   Procedure Laterality Date    CHOLECYSTECTOMY      COLECTOMY      COLONOSCOPY N/A 2019    COLONOSCOPY performed by Sherly Pacheco MD at [address] [facility] N/A 2019    COLONOSCOPY DIAGNOSTIC performed by Jamilah Ferreira MD at [address] [place] [address]  10/31/2017    CYSTOSCOPY, LEFT URETERAL STENT PLACEMENT, LEFT URETEROSCOPY, LEFT RETROGRADE PYELOGRAM    CYSTOSCOPY  2017    CYSTOSCOPY, LEFT URETEROSCOPY, LEFT RETROGRADE, STENT REMOVAL    ENTEROSCOPY N/A 2019    ENTEROSCOPY PUSH DIAGNOSTIC performed by Jamilah Ferreira MD at [address] [place] [address]      removal with islet cell transplant   [place] SPLENECTOMY          STOMACH SURGERY      half of stomach removed    TUNNELED VENOUS PORT PLACEMENT Left 2018    UPPER GASTROINTESTINAL ENDOSCOPY  2010    nasojejunostomy tube placement    UPPER GASTROINTESTINAL ENDOSCOPY N/A 2019    EGD BIOPSY performed by

## 2020-04-29 NOTE — PROGRESS NOTES
Dr. BARBOZA at bedside talking to patient ,Teaching/ education completed for home care including pain management, wound care,activity,safety precautions and infection control. Patient mother verbalized understanding.

## 2020-04-29 NOTE — ANESTHESIA PRE PROCEDURE
POCCL, POCBUN, POCHEMO, POCHCT in the last 72 hours. Coags:   Lab Results   Component Value Date    PROTIME 12.4 02/01/2011    INR 1.13 02/01/2011    APTT 33.4 02/01/2011       HCG (If Applicable):   Lab Results   Component Value Date    PREGTESTUR Negative 11/06/2019        ABGs: No results found for: PHART, PO2ART, ZEW9MWY, YGR3CTF, BEART, B8EPSHIT     Type & Screen (If Applicable):  No results found for: LABABO, [address]    Anesthesia Evaluation  Patient summary reviewed and Nursing notes reviewed no history of anesthetic complications:   Airway: Mallampati: II  TM distance: >3 FB   Neck ROM: full  Mouth opening: > = 3 FB Dental: normal exam         Pulmonary:Negative Pulmonary ROS and normal exam  breath sounds clear to auscultation      (-) COPD, asthma, sleep apnea and not a current smoker                           Cardiovascular:        (-) hypertension, past MI, CAD, CABG/stent, dysrhythmias,  angina and  CHF (echo 2017 EF 55)    ECG reviewed  Rhythm: regular  Rate: normal  Echocardiogram reviewed                  Neuro/Psych:   (+) neuromuscular disease (chronic pain syndrome, chronic narcotic use):,    (-) seizures, TIA and CVA           GI/Hepatic/Renal:   (+) GERD (gastroparesis):, liver disease (chronic pancreatitis, steatosis):,      (-) no renal disease      ROS comment: Protein-caloric malnutrition. Endo/Other:    (+) DiabetesType I DM, poorly controlled, using insulin, blood dyscrasia (fe def anemia)::., .    (-) hypothyroidism, hyperthyroidism               Abdominal:           Vascular:                                      Anesthesia Plan      MAC     ASA 3       Induction: intravenous. Anesthetic plan and risks discussed with patient. Plan discussed with CRNA.                   Sergey Blankenship MD   4/29/2020

## 2020-05-18 ENCOUNTER — HOSPITAL ENCOUNTER (EMERGENCY)
Age: 35
Discharge: HOME | End: 2020-05-18
Attending: EMERGENCY MEDICINE
Payer: MEDICARE

## 2020-05-18 VITALS
OXYGEN SATURATION: 100 % | HEART RATE: 77 BPM | SYSTOLIC BLOOD PRESSURE: 91 MMHG | TEMPERATURE: 98.6 F | DIASTOLIC BLOOD PRESSURE: 58 MMHG | RESPIRATION RATE: 16 BRPM

## 2020-05-18 LAB
A/G RATIO: 0.5 (ref 1.1–2.2)
ALBUMIN SERPL-MCNC: 1.9 G/DL (ref 3.4–5)
ALP BLD-CCNC: 121 U/L (ref 40–129)
ALT SERPL-CCNC: 23 U/L (ref 10–40)
ANION GAP SERPL CALCULATED.3IONS-SCNC: 8 MMOL/L (ref 3–16)
AST SERPL-CCNC: 29 U/L (ref 15–37)
BASOPHILS ABSOLUTE: 0.1 K/UL (ref 0–0.2)
BASOPHILS RELATIVE PERCENT: 1.5 %
BILIRUB SERPL-MCNC: 1.1 MG/DL (ref 0–1)
BUN BLDV-MCNC: 14 MG/DL (ref 7–20)
CALCIUM SERPL-MCNC: 7.7 MG/DL (ref 8.3–10.6)
CHLORIDE BLD-SCNC: 99 MMOL/L (ref 99–110)
CO2: 28 MMOL/L (ref 21–32)
CREAT SERPL-MCNC: 1.4 MG/DL (ref 0.6–1.1)
EOSINOPHILS ABSOLUTE: 0.1 K/UL (ref 0–0.6)
EOSINOPHILS RELATIVE PERCENT: 1.2 %
GFR AFRICAN AMERICAN: 52
GFR NON-AFRICAN AMERICAN: 43
GLOBULIN: 4.1 G/DL
GLUCOSE BLD-MCNC: 205 MG/DL (ref 70–99)
HCT VFR BLD CALC: 29.2 % (ref 36–48)
HEMOGLOBIN: 9.7 G/DL (ref 12–16)
LACTIC ACID, SEPSIS: 2.4 MMOL/L (ref 0.4–1.9)
LACTIC ACID, SEPSIS: 2.8 MMOL/L (ref 0.4–1.9)
LYMPHOCYTES ABSOLUTE: 4.1 K/UL (ref 1–5.1)
LYMPHOCYTES RELATIVE PERCENT: 45.2 %
MCH RBC QN AUTO: 31.5 PG (ref 26–34)
MCHC RBC AUTO-ENTMCNC: 33.1 G/DL (ref 31–36)
MCV RBC AUTO: 95.1 FL (ref 80–100)
MONOCYTES ABSOLUTE: 1.2 K/UL (ref 0–1.3)
MONOCYTES RELATIVE PERCENT: 13.3 %
NEUTROPHILS ABSOLUTE: 3.6 K/UL (ref 1.7–7.7)
NEUTROPHILS RELATIVE PERCENT: 38.8 %
PDW BLD-RTO: 17 % (ref 12.4–15.4)
PLATELET # BLD: 175 K/UL (ref 135–450)
PMV BLD AUTO: 10.4 FL (ref 5–10.5)
POTASSIUM SERPL-SCNC: 4.1 MMOL/L (ref 3.5–5.1)
RBC # BLD: 3.07 M/UL (ref 4–5.2)
SEDIMENTATION RATE, ERYTHROCYTE: 18 MM/HR (ref 0–20)
SODIUM BLD-SCNC: 135 MMOL/L (ref 136–145)
TOTAL PROTEIN: 6 G/DL (ref 6.4–8.2)
WBC # BLD: 9.2 K/UL (ref 4–11)

## 2020-05-18 PROCEDURE — 87040 BLOOD CULTURE FOR BACTERIA: CPT

## 2020-05-18 PROCEDURE — 86140 C-REACTIVE PROTEIN: CPT

## 2020-05-18 PROCEDURE — 36415 COLL VENOUS BLD VENIPUNCTURE: CPT

## 2020-05-18 PROCEDURE — 96366 THER/PROPH/DIAG IV INF ADDON: CPT

## 2020-05-18 PROCEDURE — P9047 ALBUMIN (HUMAN), 25%, 50ML: HCPCS | Performed by: EMERGENCY MEDICINE

## 2020-05-18 PROCEDURE — 83605 ASSAY OF LACTIC ACID: CPT

## 2020-05-18 PROCEDURE — 99284 EMERGENCY DEPT VISIT MOD MDM: CPT

## 2020-05-18 PROCEDURE — 6360000002 HC RX W HCPCS: Performed by: EMERGENCY MEDICINE

## 2020-05-18 PROCEDURE — 85652 RBC SED RATE AUTOMATED: CPT

## 2020-05-18 PROCEDURE — 96365 THER/PROPH/DIAG IV INF INIT: CPT

## 2020-05-18 PROCEDURE — 80053 COMPREHEN METABOLIC PANEL: CPT

## 2020-05-18 PROCEDURE — 85025 COMPLETE CBC W/AUTO DIFF WBC: CPT

## 2020-05-18 RX ORDER — ALBUMIN (HUMAN) 12.5 G/50ML
25 SOLUTION INTRAVENOUS ONCE
Status: COMPLETED | OUTPATIENT
Start: 2020-05-18 | End: 2020-05-18

## 2020-05-18 RX ADMIN — ALBUMIN (HUMAN) 25 G: 0.25 INJECTION, SOLUTION INTRAVENOUS at 20:11

## 2020-05-18 ASSESSMENT — PAIN SCALES - GENERAL: PAINLEVEL_OUTOF10: 6

## 2020-05-18 NOTE — ED PROVIDER NOTES
I independently performed a history and physical on Alayna Ellis. All diagnostic, treatment, and disposition decisions were made by myself in conjunction with the advanced practice provider. Briefly, this is a 29 y.o. female here for BLE edema, erythema. Here today for concern for cellulitis to BLE. Hx of partial pancreatectomy, DM, Gastroparesis, anemia, and rather severe protein malnutrition. On exam, BLE rash, edematous lower extremities  Rash is macular. Likely all lymphedema. Heart RRR, Lungs cTAB. Screenings                   MDM  Protein malnutrition leading to BLE edema and lymphedematous skin changes. Albumin, Diuresis. No fever or other secondary signs to point to cellulitis. Reviewed labs; indeed has a low albumin. Will DC. Gave Albumin load here. Continue high protein diet. Patient Referrals:  No follow-up provider specified. Discharge Medications:  New Prescriptions    No medications on file       FINAL IMPRESSION  No diagnosis found. Blood pressure 107/69, pulse 92, temperature 98.6 °F (37 °C), temperature source Oral, resp. rate 16, SpO2 100 %, not currently breastfeeding. For further details of Susan Akhtar emergency department encounter, please see documentation by advanced practice provider, Mike Judge.         Dayron Denson MD  05/18/20 6093

## 2020-05-19 LAB — C-REACTIVE PROTEIN: 18.6 MG/L (ref 0–5.1)

## 2020-05-19 NOTE — ED PROVIDER NOTES
at:  [facility] [address] [facility], [address]   Phone [phone]   LACTATE, SEPSIS - Abnormal; Notable for the following components:    Lactic Acid, Sepsis 2.4 (*)     All other components within normal limits    Narrative:     Performed at:  [facility] [address] [facility]  [place], [address]   Phone [phone]   CBC WITH AUTO DIFFERENTIAL - Abnormal; Notable for the following components:    RBC 3.07 (*)     Hemoglobin 9.7 (*)     Hematocrit 29.2 (*)     RDW 17.0 (*)     All other components within normal limits    Narrative:     Performed at:  [facility] [address] [facility] [address]   Phone [phone]   COMPREHENSIVE METABOLIC PANEL - Abnormal; Notable for the following components:    Sodium 135 (*)     Glucose 205 (*)     CREATININE 1.4 (*)     GFR Non- 43 (*)     GFR  52 (*)     Calcium 7.7 (*)     Total Protein 6.0 (*)     Alb 1.9 (*)     Albumin/Globulin Ratio 0.5 (*)     Total Bilirubin 1.1 (*)     All other components within normal limits    Narrative:     Performed at:  [facility] [address],  [place], [facility] [address]   Phone [phone]   CULTURE, BLOOD 1    Narrative:     ORDER#: [id number]                          ORDERED BY: Ghazala Reynoso  SOURCE: Blood                              COLLECTED:  05/18/20 18:20  ANTIBIOTICS AT BRUNA.:                      RECEIVED :  05/19/20 03:29  If child <=2 yrs old please draw pediatric bottle. ~Blood Culture #1  Performed at:  [facility]  [address] [place], [place] [address]   Phone [phone]   SEDIMENTATION RATE    Narrative:     Performed at:  [facility] [address], [address]   Phone [phone]       All other labs were within normal range or not returned as of this dictation. EKG: All EKG's are interpreted by the Emergency Department Physician in the absence of a cardiologist.  Please see their note for interpretation of EKG. RADIOLOGY:   Non-plain film images such as CT, Ultrasound and MRI are read by the radiologist. Plain radiographic images are visualized and preliminarily interpreted by the ED Provider with the below findings:        Interpretation per the Radiologist below, if available at the time of this note:    No orders to display     No results found. PROCEDURES   Unless otherwise noted below, none     Procedures    CRITICAL CARE TIME   N/A    CONSULTS:  None      EMERGENCY DEPARTMENT COURSE and DIFFERENTIAL DIAGNOSIS/MDM:   Vitals:    Vitals:    05/18/20 1400 05/18/20 1930 05/18/20 2015 05/18/20 2200   BP: 107/69 (!) 93/52 (!) 99/59 (!) 91/58   Pulse: 92   77   Resp: 16      Temp: 98.6 °F (37 °C)      TempSrc: Oral      SpO2: 100% 98% 100% 100%       Patient was given the following medications:  Medications   albumin human 25 % IV solution 25 g (0 g Intravenous Stopped 5/18/20 2150)       Briefly, this is a 29year old female that  presents to the emergency department complaining of redness and swelling to her lower legs. Concern for cellulitis. She has complicated history including pancreatectomy, diabetes, gastroparesis, anemia, and severe protein malnutrition. She does have a macular rash to bilateral lower extremities and edema present. States that her weight is up slightly. Labs are as reported above, albumin level was low she was given replacement albumin here in the emergency department. She is instructed to continue high-protein diet, she was dispositioned, discharged by attending physician. Please see his note. FINAL IMPRESSION      1. Leg edema    2. Protein malnutrition ([address]    3.  Hypoalbuminemia due to protein-calorie malnutrition [facility])          DISPOSITION/PLAN   DISPOSITION Decision To Discharge 05/18/2020 07:59:33

## 2020-05-20 ENCOUNTER — HOSPITAL ENCOUNTER (OUTPATIENT)
Dept: VASCULAR LAB | Age: 35
Discharge: HOME | End: 2020-05-20
Payer: MEDICARE

## 2020-05-20 PROCEDURE — 93970 EXTREMITY STUDY: CPT

## 2020-05-20 ASSESSMENT — ENCOUNTER SYMPTOMS
DIARRHEA: 0
VOMITING: 0
CHEST TIGHTNESS: 0
ABDOMINAL PAIN: 0
SHORTNESS OF BREATH: 0
NAUSEA: 0

## 2020-05-22 LAB — BLOOD CULTURE, ROUTINE: NORMAL

## 2020-05-27 ENCOUNTER — HOSPITAL ENCOUNTER (EMERGENCY)
Age: 35
Discharge: HOME | DRG: 391 | End: 2020-05-28
Attending: EMERGENCY MEDICINE
Payer: MEDICARE

## 2020-05-27 LAB
A/G RATIO: 0.6 (ref 1.1–2.2)
ALBUMIN SERPL-MCNC: 2.4 G/DL (ref 3.4–5)
ALP BLD-CCNC: 125 U/L (ref 40–129)
ALT SERPL-CCNC: 22 U/L (ref 10–40)
ANION GAP SERPL CALCULATED.3IONS-SCNC: 9 MMOL/L (ref 3–16)
AST SERPL-CCNC: 38 U/L (ref 15–37)
BACTERIA: ABNORMAL /HPF
BASOPHILS ABSOLUTE: 0.1 K/UL (ref 0–0.2)
BASOPHILS RELATIVE PERCENT: 0.8 %
BILIRUB SERPL-MCNC: 2.7 MG/DL (ref 0–1)
BILIRUBIN URINE: ABNORMAL
BLOOD, URINE: NEGATIVE
BUN BLDV-MCNC: 9 MG/DL (ref 7–20)
CALCIUM SERPL-MCNC: 7.9 MG/DL (ref 8.3–10.6)
CHLORIDE BLD-SCNC: 94 MMOL/L (ref 99–110)
CLARITY: ABNORMAL
CO2: 29 MMOL/L (ref 21–32)
COLOR: ABNORMAL
CREAT SERPL-MCNC: 0.9 MG/DL (ref 0.6–1.1)
EOSINOPHILS ABSOLUTE: 0 K/UL (ref 0–0.6)
EOSINOPHILS RELATIVE PERCENT: 0.1 %
EPITHELIAL CELLS, UA: 16 /HPF (ref 0–5)
GFR AFRICAN AMERICAN: >60
GFR NON-AFRICAN AMERICAN: >60
GLOBULIN: 3.7 G/DL
GLUCOSE BLD-MCNC: 184 MG/DL (ref 70–99)
GLUCOSE URINE: NEGATIVE MG/DL
HCG QUALITATIVE: NEGATIVE
HCT VFR BLD CALC: 26.6 % (ref 36–48)
HEMOGLOBIN: 9 G/DL (ref 12–16)
KETONES, URINE: ABNORMAL MG/DL
LEUKOCYTE ESTERASE, URINE: ABNORMAL
LIPASE: 5 U/L (ref 13–60)
LYMPHOCYTES ABSOLUTE: 2.7 K/UL (ref 1–5.1)
LYMPHOCYTES RELATIVE PERCENT: 22.1 %
MAGNESIUM: 1.9 MG/DL (ref 1.8–2.4)
MCH RBC QN AUTO: 32 PG (ref 26–34)
MCHC RBC AUTO-ENTMCNC: 34 G/DL (ref 31–36)
MCV RBC AUTO: 94 FL (ref 80–100)
MICROSCOPIC EXAMINATION: YES
MONOCYTES ABSOLUTE: 0.7 K/UL (ref 0–1.3)
MONOCYTES RELATIVE PERCENT: 6.1 %
NEUTROPHILS ABSOLUTE: 8.7 K/UL (ref 1.7–7.7)
NEUTROPHILS RELATIVE PERCENT: 70.9 %
NITRITE, URINE: NEGATIVE
PDW BLD-RTO: 16.7 % (ref 12.4–15.4)
PH UA: 6 (ref 5–8)
PLATELET # BLD: 203 K/UL (ref 135–450)
PMV BLD AUTO: 9.7 FL (ref 5–10.5)
POTASSIUM SERPL-SCNC: 3.8 MMOL/L (ref 3.5–5.1)
PROTEIN UA: NEGATIVE MG/DL
RBC # BLD: 2.83 M/UL (ref 4–5.2)
RBC UA: 6 /HPF (ref 0–4)
SODIUM BLD-SCNC: 132 MMOL/L (ref 136–145)
SPECIFIC GRAVITY UA: 1.02 (ref 1–1.03)
TOTAL PROTEIN: 6.1 G/DL (ref 6.4–8.2)
URINE REFLEX TO CULTURE: YES
URINE TYPE: ABNORMAL
UROBILINOGEN, URINE: 2 E.U./DL
WBC # BLD: 12.3 K/UL (ref 4–11)
WBC UA: 19 /HPF (ref 0–5)

## 2020-05-27 PROCEDURE — 87086 URINE CULTURE/COLONY COUNT: CPT

## 2020-05-27 PROCEDURE — 99284 EMERGENCY DEPT VISIT MOD MDM: CPT

## 2020-05-27 PROCEDURE — 6360000002 HC RX W HCPCS: Performed by: PHYSICIAN ASSISTANT

## 2020-05-27 PROCEDURE — 2580000003 HC RX 258: Performed by: PHYSICIAN ASSISTANT

## 2020-05-27 PROCEDURE — 80053 COMPREHEN METABOLIC PANEL: CPT

## 2020-05-27 PROCEDURE — 85025 COMPLETE CBC W/AUTO DIFF WBC: CPT

## 2020-05-27 PROCEDURE — 96374 THER/PROPH/DIAG INJ IV PUSH: CPT

## 2020-05-27 PROCEDURE — 96375 TX/PRO/DX INJ NEW DRUG ADDON: CPT

## 2020-05-27 PROCEDURE — 84703 CHORIONIC GONADOTROPIN ASSAY: CPT

## 2020-05-27 PROCEDURE — 36415 COLL VENOUS BLD VENIPUNCTURE: CPT

## 2020-05-27 PROCEDURE — 81001 URINALYSIS AUTO W/SCOPE: CPT

## 2020-05-27 PROCEDURE — 87077 CULTURE AEROBIC IDENTIFY: CPT

## 2020-05-27 PROCEDURE — 83735 ASSAY OF MAGNESIUM: CPT

## 2020-05-27 PROCEDURE — 87186 SC STD MICRODIL/AGAR DIL: CPT

## 2020-05-27 PROCEDURE — 83690 ASSAY OF LIPASE: CPT

## 2020-05-27 RX ORDER — 0.9 % SODIUM CHLORIDE 0.9 %
1000 INTRAVENOUS SOLUTION INTRAVENOUS ONCE
Status: COMPLETED | OUTPATIENT
Start: 2020-05-27 | End: 2020-05-28

## 2020-05-27 RX ORDER — ONDANSETRON 2 MG/ML
4 INJECTION INTRAMUSCULAR; INTRAVENOUS ONCE
Status: COMPLETED | OUTPATIENT
Start: 2020-05-27 | End: 2020-05-27

## 2020-05-27 RX ADMIN — ONDANSETRON 4 MG: 2 INJECTION INTRAMUSCULAR; INTRAVENOUS at 22:47

## 2020-05-27 RX ADMIN — SODIUM CHLORIDE 1000 ML: 9 INJECTION, SOLUTION INTRAVENOUS at 22:47

## 2020-05-27 ASSESSMENT — ENCOUNTER SYMPTOMS
COUGH: 0
DIARRHEA: 0
SHORTNESS OF BREATH: 0
RHINORRHEA: 0
ABDOMINAL PAIN: 1
VOMITING: 1
NAUSEA: 1
WHEEZING: 0

## 2020-05-27 ASSESSMENT — PAIN SCALES - GENERAL: PAINLEVEL_OUTOF10: 7

## 2020-05-28 VITALS
DIASTOLIC BLOOD PRESSURE: 59 MMHG | SYSTOLIC BLOOD PRESSURE: 98 MMHG | TEMPERATURE: 98.5 F | OXYGEN SATURATION: 98 % | RESPIRATION RATE: 15 BRPM | HEART RATE: 90 BPM

## 2020-05-28 NOTE — ED PROVIDER NOTES
not syncopized. She has no other complaints or concerns at this time. Nursing Notes were all reviewed and agreed with or any disagreements were addressed in the HPI. REVIEW OF SYSTEMS    (2-9 systems for level 4, 10 or more for level 5)     Review of Systems   Constitutional: Positive for fatigue. Negative for appetite change, chills and fever. HENT: Negative for congestion and rhinorrhea. Respiratory: Negative for cough, shortness of breath and wheezing. Cardiovascular: Negative for chest pain. Gastrointestinal: Positive for abdominal pain, nausea and vomiting. Negative for diarrhea. Genitourinary: Negative for difficulty urinating, dysuria and hematuria. Musculoskeletal: Negative for neck pain and neck stiffness. Skin: Negative for rash. Neurological: Positive for dizziness and weakness. Negative for syncope, light-headedness and headaches. Positives and Pertinent negatives as per HPI. Except as noted above in the ROS, all other systems were reviewed and negative.        PAST MEDICAL HISTORY     Past Medical History:   Diagnosis Date    Abdominal pain     with nausea and weight loss    Amenorrhea     Carbapenem-resistant bacterial infection 11/08/2017    urine    Chronic pancreatitis ([facility]     R/T CFTR MUTATION    Diabetes mellitus ([facility] 2006    since pancreas removed    Digestive disorder     Gastroparesis     Hepatic steatosis     History of blood transfusion 04/11/2017    MARIANA (iron deficiency anemia)     Kidney stone     Kidney stone 11/08/2017    PICC (peripherally inserted central catheter) in place     NOT CURRENT 3-2-18    Seizure ([address]     ONE EPISODE IN 2011         SURGICAL HISTORY     Past Surgical History:   Procedure Laterality Date    CHOLECYSTECTOMY      COLECTOMY      COLONOSCOPY N/A 7/25/2019    COLONOSCOPY performed by Paty Myles MD at [facility] COLONOSCOPY N/A 11/6/2019    COLONOSCOPY DIAGNOSTIC performed by Leatha Chaves MD at [facility]    CYSTOSCOPY  10/31/2017    CYSTOSCOPY, LEFT URETERAL STENT PLACEMENT, LEFT URETEROSCOPY, LEFT RETROGRADE PYELOGRAM    CYSTOSCOPY  11/09/2017    CYSTOSCOPY, LEFT URETEROSCOPY, LEFT RETROGRADE, STENT REMOVAL    ENTEROSCOPY N/A 11/6/2019    ENTEROSCOPY PUSH DIAGNOSTIC performed by Chandra Rusos MD at [address] [place] [address]  8/06    removal with islet cell transplant    SIGMOIDOSCOPY N/A 4/29/2020    SIGMOIDOSCOPY DIAGNOSTIC FLEXIBLE performed by Karol Teague MD at [address] [place] [address]      8/06    STOMACH SURGERY  12/06    half of stomach removed    TUNNELED VENOUS PORT PLACEMENT Left 03/09/2018    UPPER GASTROINTESTINAL ENDOSCOPY  11/30/2010    nasojejunostomy tube placement    UPPER GASTROINTESTINAL ENDOSCOPY N/A 7/25/2019    EGD BIOPSY performed by Karol Teague MD at [address]       Previous Medications    ACETAMINOPHEN (TYLENOL) 500 MG TABLET    Take 500 mg by mouth every 6 hours as needed for Pain    FUROSEMIDE (LASIX) 40 MG TABLET    Take 40 mg by mouth daily Indications: 40-80mg daily    INSULIN ASPART (NOVOLOG) 100 UNIT/ML INJECTION    Inject  into the skin. 1 unit for every 50 of sugar above 150 qac    INSULIN GLARGINE (LANTUS) 100 UNIT/ML INJECTION VIAL    Inject 5 Units into the skin nightly     LAMOTRIGINE (LAMICTAL) 25 MG TABLET    Take 75 mg by mouth 2 times daily Indications: 3 pills BID     METHADONE (DOLOPHINE) 10 MG TABLET    Take 10 mg by mouth 2 times daily .     NITROGLYCERIN (RECTIV) 0.4 % RECTAL OINTMENT    Place 1 inch rectally every 12 hours    ONDANSETRON (ZOFRAN) 4 MG TABLET    Take 4 mg by mouth every 6 hours as needed for Nausea or Vomiting    PANCRELIPASE, LIP-PROT-AMYL, (CREON) 90436 UNITS CPEP    Take 1 tablet by mouth 3 times daily Takes 1 tablets with each meal    POLYETHYLENE GLYCOL (GLYCOLAX) PACKET    Take 150 g by mouth daily Pt normally takes 6 bags of 17 g every night. PROMETHAZINE (PHENERGAN) 25 MG TABLET    Take 12.5 mg by mouth every 6 hours as needed     SPIRONOLACTONE (ALDACTONE) 25 MG TABLET    Take 25 mg by mouth daily         ALLERGIES     Adhesive tape; Iron glycinate [iron]; and Pepcid [famotidine]    FAMILYHISTORY       Family History   Problem Relation Age of Onset    Heart Disease Maternal Grandfather     High Blood Pressure Maternal Grandmother     Cancer Neg Hx     Diabetes Neg Hx     Stroke Neg Hx     Osteoporosis Neg Hx     Thyroid Disease Neg Hx           SOCIAL HISTORY       Social History     Tobacco Use    Smoking status: Never Smoker    Smokeless tobacco: Never Used   Substance Use Topics    Alcohol use: No    Drug use: No       SCREENINGS             PHYSICAL EXAM    (up to 7 for level 4, 8 or more for level 5)     ED Triage Vitals [05/27/20 2127]   BP Temp Temp src Pulse Resp SpO2 Height Weight   101/65 98.5 °F (36.9 °C) -- 107 14 96 % -- --       Physical Exam  Vitals signs and nursing note reviewed. Constitutional:       General: She is not in acute distress. Appearance: She is well-developed. She is ill-appearing. She is not toxic-appearing or diaphoretic. HENT:      Head: Normocephalic and atraumatic. Right Ear: External ear normal.      Left Ear: External ear normal.      Nose: Nose normal.   Eyes:      General:         Right eye: No discharge. Left eye: No discharge. Neck:      Musculoskeletal: Normal range of motion and neck supple. Cardiovascular:      Rate and Rhythm: Regular rhythm. Tachycardia present. Heart sounds: Normal heart sounds. Pulmonary:      Effort: Pulmonary effort is normal. No respiratory distress. Breath sounds: Normal breath sounds. No rhonchi. Chest:      Chest wall: No tenderness. Abdominal:      General: Bowel sounds are normal. There is no distension. Palpations: Abdomen is soft. Tenderness:  There is abdominal otherwise stable and she is afebrile. Lungs are clear to auscultation bilaterally. She has generalized crampy abdominal pain with no peritoneal signs. PEG tube in place. She was started on IV fluid resuscitation and given Zofran for symptomatic relief and will be reevaluated. CBC and CMP are remarkable for mild leukocytosis of 12.3 and chronic anemia with hemoglobin of 9. She has a bili of 2.7, which does seem to be consistent with baseline. Lipase is 5. Beta-hCG is negative. Mag 1.9. Urinalysis is positive for moderate leukocytes, 1+ bacteria, 19 white blood cells but 16 epithelial cells. This marks end of shift. Please see attending note for additional information regarding reevaluation, additional results any consultations and disposition. FINAL IMPRESSION      1. Generalized weakness    2. Fatigue, unspecified type          DISPOSITION/PLAN   DISPOSITION        PATIENT REFERREDTO:  No follow-up provider specified.     DISCHARGE MEDICATIONS:  New Prescriptions    No medications on file       DISCONTINUED MEDICATIONS:  Discontinued Medications    No medications on file              (Please note that portions of this note were completed with a voice recognition program.  Efforts were made to edit the dictations but occasionally words are mis-transcribed.)    Carrie Craft PA-C (electronically signed)            Anil Keen PA-C  05/27/20 [id number]

## 2020-05-29 ENCOUNTER — OFFICE VISIT (OUTPATIENT)
Dept: PRIMARY CARE CLINIC | Age: 35
End: 2020-05-29
Payer: COMMERCIAL

## 2020-05-29 ENCOUNTER — HOSPITAL ENCOUNTER (INPATIENT)
Age: 35
LOS: 4 days | Discharge: HOME | DRG: 391 | End: 2020-06-04
Attending: EMERGENCY MEDICINE | Admitting: INTERNAL MEDICINE
Payer: MEDICARE

## 2020-05-29 ENCOUNTER — APPOINTMENT (OUTPATIENT)
Dept: CT IMAGING | Age: 35
DRG: 391 | End: 2020-05-29
Payer: MEDICARE

## 2020-05-29 ENCOUNTER — CARE COORDINATION (OUTPATIENT)
Dept: CASE MANAGEMENT | Age: 35
End: 2020-05-29

## 2020-05-29 VITALS — HEART RATE: 86 BPM | TEMPERATURE: 98.3 F | OXYGEN SATURATION: 94 %

## 2020-05-29 PROBLEM — K52.9 COLITIS: Status: ACTIVE | Noted: 2020-05-29

## 2020-05-29 LAB
A/G RATIO: 0.8 (ref 1.1–2.2)
ALBUMIN SERPL-MCNC: 2.8 G/DL (ref 3.4–5)
ALP BLD-CCNC: 110 U/L (ref 40–129)
ALT SERPL-CCNC: 15 U/L (ref 10–40)
ANION GAP SERPL CALCULATED.3IONS-SCNC: 7 MMOL/L (ref 3–16)
AST SERPL-CCNC: 34 U/L (ref 15–37)
BASOPHILS ABSOLUTE: 0.1 K/UL (ref 0–0.2)
BASOPHILS RELATIVE PERCENT: 0.5 %
BILIRUB SERPL-MCNC: 2.8 MG/DL (ref 0–1)
BILIRUBIN URINE: NEGATIVE
BLOOD, URINE: NEGATIVE
BUN BLDV-MCNC: 7 MG/DL (ref 7–20)
CALCIUM SERPL-MCNC: 8 MG/DL (ref 8.3–10.6)
CHLORIDE BLD-SCNC: 96 MMOL/L (ref 99–110)
CLARITY: CLEAR
CO2: 30 MMOL/L (ref 21–32)
COLOR: YELLOW
CREAT SERPL-MCNC: 0.7 MG/DL (ref 0.6–1.1)
EOSINOPHILS ABSOLUTE: 0.1 K/UL (ref 0–0.6)
EOSINOPHILS RELATIVE PERCENT: 0.6 %
GFR AFRICAN AMERICAN: >60
GFR NON-AFRICAN AMERICAN: >60
GLOBULIN: 3.5 G/DL
GLUCOSE BLD-MCNC: 133 MG/DL (ref 70–99)
GLUCOSE URINE: NEGATIVE MG/DL
HCG QUALITATIVE: NEGATIVE
HCT VFR BLD CALC: 23.9 % (ref 36–48)
HEMOGLOBIN: 8 G/DL (ref 12–16)
KETONES, URINE: NEGATIVE MG/DL
LEUKOCYTE ESTERASE, URINE: NEGATIVE
LIPASE: 6 U/L (ref 13–60)
LYMPHOCYTES ABSOLUTE: 4.1 K/UL (ref 1–5.1)
LYMPHOCYTES RELATIVE PERCENT: 37 %
MCH RBC QN AUTO: 31.3 PG (ref 26–34)
MCHC RBC AUTO-ENTMCNC: 33.4 G/DL (ref 31–36)
MCV RBC AUTO: 93.6 FL (ref 80–100)
MICROSCOPIC EXAMINATION: NORMAL
MONOCYTES ABSOLUTE: 1.1 K/UL (ref 0–1.3)
MONOCYTES RELATIVE PERCENT: 10 %
NEUTROPHILS ABSOLUTE: 5.7 K/UL (ref 1.7–7.7)
NEUTROPHILS RELATIVE PERCENT: 51.9 %
NITRITE, URINE: NEGATIVE
PDW BLD-RTO: 16.7 % (ref 12.4–15.4)
PH UA: 5.5 (ref 5–8)
PLATELET # BLD: 219 K/UL (ref 135–450)
PMV BLD AUTO: 9.5 FL (ref 5–10.5)
POTASSIUM REFLEX MAGNESIUM: 3.6 MMOL/L (ref 3.5–5.1)
PROTEIN UA: NEGATIVE MG/DL
RBC # BLD: 2.55 M/UL (ref 4–5.2)
SODIUM BLD-SCNC: 133 MMOL/L (ref 136–145)
SPECIFIC GRAVITY UA: 1.01 (ref 1–1.03)
TOTAL PROTEIN: 6.3 G/DL (ref 6.4–8.2)
URINE REFLEX TO CULTURE: NORMAL
URINE TYPE: NORMAL
UROBILINOGEN, URINE: 1 E.U./DL
WBC # BLD: 11.1 K/UL (ref 4–11)

## 2020-05-29 PROCEDURE — P9047 ALBUMIN (HUMAN), 25%, 50ML: HCPCS | Performed by: PHYSICIAN ASSISTANT

## 2020-05-29 PROCEDURE — 1036F TOBACCO NON-USER: CPT | Performed by: FAMILY MEDICINE

## 2020-05-29 PROCEDURE — 80053 COMPREHEN METABOLIC PANEL: CPT

## 2020-05-29 PROCEDURE — 96365 THER/PROPH/DIAG IV INF INIT: CPT

## 2020-05-29 PROCEDURE — 81003 URINALYSIS AUTO W/O SCOPE: CPT

## 2020-05-29 PROCEDURE — 85025 COMPLETE CBC W/AUTO DIFF WBC: CPT

## 2020-05-29 PROCEDURE — 96367 TX/PROPH/DG ADDL SEQ IV INF: CPT

## 2020-05-29 PROCEDURE — 6360000002 HC RX W HCPCS: Performed by: PHYSICIAN ASSISTANT

## 2020-05-29 PROCEDURE — 74177 CT ABD & PELVIS W/CONTRAST: CPT

## 2020-05-29 PROCEDURE — G0378 HOSPITAL OBSERVATION PER HR: HCPCS

## 2020-05-29 PROCEDURE — 36415 COLL VENOUS BLD VENIPUNCTURE: CPT

## 2020-05-29 PROCEDURE — G8428 CUR MEDS NOT DOCUMENT: HCPCS | Performed by: FAMILY MEDICINE

## 2020-05-29 PROCEDURE — G8419 CALC BMI OUT NRM PARAM NOF/U: HCPCS | Performed by: FAMILY MEDICINE

## 2020-05-29 PROCEDURE — 99213 OFFICE O/P EST LOW 20 MIN: CPT | Performed by: FAMILY MEDICINE

## 2020-05-29 PROCEDURE — 84703 CHORIONIC GONADOTROPIN ASSAY: CPT

## 2020-05-29 PROCEDURE — 83690 ASSAY OF LIPASE: CPT

## 2020-05-29 PROCEDURE — 99285 EMERGENCY DEPT VISIT HI MDM: CPT

## 2020-05-29 PROCEDURE — 6360000004 HC RX CONTRAST MEDICATION: Performed by: PHYSICIAN ASSISTANT

## 2020-05-29 RX ORDER — CIPROFLOXACIN 2 MG/ML
400 INJECTION, SOLUTION INTRAVENOUS ONCE
Status: COMPLETED | OUTPATIENT
Start: 2020-05-29 | End: 2020-05-29

## 2020-05-29 RX ORDER — ALBUMIN (HUMAN) 12.5 G/50ML
12.5 SOLUTION INTRAVENOUS ONCE
Status: COMPLETED | OUTPATIENT
Start: 2020-05-29 | End: 2020-05-30

## 2020-05-29 RX ADMIN — CIPROFLOXACIN 400 MG: 2 INJECTION, SOLUTION INTRAVENOUS at 22:14

## 2020-05-29 RX ADMIN — IOPAMIDOL 75 ML: 755 INJECTION, SOLUTION INTRAVENOUS at 21:04

## 2020-05-29 RX ADMIN — ALBUMIN (HUMAN) 12.5 G: 0.25 INJECTION, SOLUTION INTRAVENOUS at 23:28

## 2020-05-29 ASSESSMENT — PAIN SCALES - GENERAL
PAINLEVEL_OUTOF10: 4
PAINLEVEL_OUTOF10: 4

## 2020-05-29 ASSESSMENT — PAIN DESCRIPTION - PAIN TYPE
TYPE: CHRONIC PAIN
TYPE: CHRONIC PAIN

## 2020-05-29 ASSESSMENT — PAIN DESCRIPTION - LOCATION
LOCATION: ABDOMEN
LOCATION: ABDOMEN

## 2020-05-29 NOTE — CARE COORDINATION
Attempted to contact patient for ED f/u call; left HIPPA compliant message and ACM contact information

## 2020-05-29 NOTE — ED PROVIDER NOTES
[place] [address]   Phone [phone]   URINE RT REFLEX TO CULTURE    Narrative:     Performed at:  [facility] [address] [address], [address]   Phone [phone]       All other labs were within normal range or not returned as of this dictation. EKG: All EKG's are interpreted by the Emergency Department Physician in the absence of a cardiologist.  Please see their note for interpretation of EKG. RADIOLOGY:   Non-plain film images such as CT, Ultrasound and MRI are read by the radiologist. Plain radiographic images are visualized and preliminarily interpreted by the ED Provider with the below findings:        Interpretation per the Radiologist below, if available at the time of this note:    CT ABDOMEN PELVIS W IV CONTRAST Additional Contrast? None   Final Result   1. Thickening of the ascending and transverse colon consistent with colitis. This was not seen in the previous evaluation. 2. Jejunostomy tube in adequate position. 3. Mild ascites. 4. Atelectatic changes right lower lobe. 5. Redemonstration of fatty liver. No results found.         PROCEDURES   Unless otherwise noted below, none     Procedures    CRITICAL CARE TIME   N/A    CONSULTS:  IP CONSULT TO HOSPITALIST      EMERGENCY DEPARTMENT COURSE and DIFFERENTIAL DIAGNOSIS/MDM:   Vitals:    Vitals:    05/29/20 2330 05/29/20 2345 05/30/20 0115 05/30/20 0130   BP: (!) 93/58 (!) 97/55 (!) 92/54    Pulse:  79 78    Resp:  14 16    Temp:   97.8 °F (36.6 °C)    TempSrc:   Temporal    SpO2: 97% 94% 99%    Weight:    111 lb 9.6 oz (50.6 kg)   Height:           Patient was given the following medications:  Medications   lamoTRIgine (LAMICTAL) tablet 75 mg (has no administration in time range)   methadone (DOLOPHINE) tablet 10 mg (has no administration in time range)   lipase-protease-amylase (CREON) delayed release capsule 6,000 Units (has no administration in time range)   spironolactone (ALDACTONE) tablet 25 mg (has no administration in time range)   polyethylene glycol (GLYCOLAX) packet 17 g (has no administration in time range)   sodium chloride flush 0.9 % injection 10 mL (has no administration in time range)   sodium chloride flush 0.9 % injection 10 mL (has no administration in time range)   promethazine (PHENERGAN) tablet 12.5 mg (has no administration in time range)     Or   ondansetron (ZOFRAN) injection 4 mg (has no administration in time range)   enoxaparin (LOVENOX) injection 40 mg (has no administration in time range)   potassium chloride 10 mEq/100 mL IVPB (Peripheral Line) (has no administration in time range)   magnesium sulfate 1 g in dextrose 5% 100 mL IVPB (has no administration in time range)   sodium phosphate 7.71 mmol in dextrose 5 % 250 mL IVPB (has no administration in time range)     Or   sodium phosphate 15.39 mmol in dextrose 5 % 250 mL IVPB (has no administration in time range)   lactated ringers infusion (has no administration in time range)   ondansetron (ZOFRAN-ODT) disintegrating tablet 4 mg (has no administration in time range)   iopamidol (ISOVUE-370) 76 % injection 75 mL (75 mLs Intravenous Given 5/29/20 2104)   ciprofloxacin (CIPRO) IVPB 400 mg (0 mg Intravenous Stopped 5/29/20 2314)   albumin human 25 % IV solution 12.5 g (0 g Intravenous Stopped 5/30/20 0028)       Patient presents emergency department for evaluation of lightheadedness and generalized fatigue over the past week and a half. Patient has an extensive surgical history and most recently had a surgery to repair damaged hernia mesh. Patient has had delayed wound healing from this surgery. Patient's lungs sound clear to auscultation bilaterally heart rate is regular without murmurs rubs or gallops. Abdomen is soft and nontender. Patient has a J-tube which appears to be well functioning. Patient does use this daily for tube feeds although she is able to tolerate food and liquids by mouth as well.   Patient has

## 2020-05-30 LAB
GLUCOSE BLD-MCNC: 105 MG/DL (ref 70–99)
GLUCOSE BLD-MCNC: 127 MG/DL (ref 70–99)
GLUCOSE BLD-MCNC: 93 MG/DL (ref 70–99)
GLUCOSE BLD-MCNC: 95 MG/DL (ref 70–99)
ORGANISM: ABNORMAL
PERFORMED ON: ABNORMAL
PERFORMED ON: ABNORMAL
PERFORMED ON: NORMAL
PERFORMED ON: NORMAL
URINE CULTURE, ROUTINE: ABNORMAL
URINE CULTURE, ROUTINE: ABNORMAL

## 2020-05-30 PROCEDURE — P9047 ALBUMIN (HUMAN), 25%, 50ML: HCPCS | Performed by: INTERNAL MEDICINE

## 2020-05-30 PROCEDURE — 6360000002 HC RX W HCPCS: Performed by: INTERNAL MEDICINE

## 2020-05-30 PROCEDURE — G0378 HOSPITAL OBSERVATION PER HR: HCPCS

## 2020-05-30 PROCEDURE — 96366 THER/PROPH/DIAG IV INF ADDON: CPT

## 2020-05-30 PROCEDURE — 96372 THER/PROPH/DIAG INJ SC/IM: CPT

## 2020-05-30 PROCEDURE — 6370000000 HC RX 637 (ALT 250 FOR IP): Performed by: INTERNAL MEDICINE

## 2020-05-30 PROCEDURE — 96376 TX/PRO/DX INJ SAME DRUG ADON: CPT

## 2020-05-30 PROCEDURE — 2580000003 HC RX 258: Performed by: INTERNAL MEDICINE

## 2020-05-30 RX ORDER — ONDANSETRON 4 MG/1
4 TABLET, FILM COATED ORAL EVERY 6 HOURS PRN
Status: DISCONTINUED | OUTPATIENT
Start: 2020-05-30 | End: 2020-05-30 | Stop reason: CLARIF

## 2020-05-30 RX ORDER — INSULIN LISPRO 100 [IU]/ML
0-6 INJECTION, SOLUTION INTRAVENOUS; SUBCUTANEOUS
Status: DISCONTINUED | OUTPATIENT
Start: 2020-05-30 | End: 2020-06-04 | Stop reason: HOSPADM

## 2020-05-30 RX ORDER — SODIUM CHLORIDE 0.9 % (FLUSH) 0.9 %
10 SYRINGE (ML) INJECTION PRN
Status: DISCONTINUED | OUTPATIENT
Start: 2020-05-30 | End: 2020-06-04 | Stop reason: HOSPADM

## 2020-05-30 RX ORDER — DEXTROSE MONOHYDRATE 50 MG/ML
100 INJECTION, SOLUTION INTRAVENOUS PRN
Status: DISCONTINUED | OUTPATIENT
Start: 2020-05-30 | End: 2020-06-04 | Stop reason: HOSPADM

## 2020-05-30 RX ORDER — POTASSIUM CHLORIDE 7.45 MG/ML
10 INJECTION INTRAVENOUS PRN
Status: DISCONTINUED | OUTPATIENT
Start: 2020-05-30 | End: 2020-06-04 | Stop reason: HOSPADM

## 2020-05-30 RX ORDER — MAGNESIUM SULFATE 1 G/100ML
1 INJECTION INTRAVENOUS PRN
Status: DISCONTINUED | OUTPATIENT
Start: 2020-05-30 | End: 2020-06-04 | Stop reason: HOSPADM

## 2020-05-30 RX ORDER — SODIUM CHLORIDE 0.9 % (FLUSH) 0.9 %
10 SYRINGE (ML) INJECTION EVERY 12 HOURS SCHEDULED
Status: DISCONTINUED | OUTPATIENT
Start: 2020-05-30 | End: 2020-06-04 | Stop reason: HOSPADM

## 2020-05-30 RX ORDER — INSULIN LISPRO 100 [IU]/ML
0-3 INJECTION, SOLUTION INTRAVENOUS; SUBCUTANEOUS NIGHTLY
Status: DISCONTINUED | OUTPATIENT
Start: 2020-05-30 | End: 2020-06-04 | Stop reason: HOSPADM

## 2020-05-30 RX ORDER — SPIRONOLACTONE 25 MG/1
50 TABLET ORAL DAILY
Status: DISCONTINUED | OUTPATIENT
Start: 2020-05-31 | End: 2020-06-02

## 2020-05-30 RX ORDER — ONDANSETRON 4 MG/1
4 TABLET, ORALLY DISINTEGRATING ORAL EVERY 6 HOURS PRN
Status: DISCONTINUED | OUTPATIENT
Start: 2020-05-30 | End: 2020-06-04 | Stop reason: HOSPADM

## 2020-05-30 RX ORDER — METHADONE HYDROCHLORIDE 10 MG/1
10 TABLET ORAL 2 TIMES DAILY
Status: DISCONTINUED | OUTPATIENT
Start: 2020-05-30 | End: 2020-06-04 | Stop reason: HOSPADM

## 2020-05-30 RX ORDER — SPIRONOLACTONE 25 MG/1
25 TABLET ORAL DAILY
Status: DISCONTINUED | OUTPATIENT
Start: 2020-05-30 | End: 2020-05-30

## 2020-05-30 RX ORDER — ALBUMIN (HUMAN) 12.5 G/50ML
25 SOLUTION INTRAVENOUS EVERY 8 HOURS
Status: DISPENSED | OUTPATIENT
Start: 2020-05-30 | End: 2020-06-01

## 2020-05-30 RX ORDER — DEXTROSE MONOHYDRATE 25 G/50ML
12.5 INJECTION, SOLUTION INTRAVENOUS PRN
Status: DISCONTINUED | OUTPATIENT
Start: 2020-05-30 | End: 2020-06-04 | Stop reason: HOSPADM

## 2020-05-30 RX ORDER — POLYETHYLENE GLYCOL 3350 17 G/17G
17 POWDER, FOR SOLUTION ORAL DAILY
Status: DISCONTINUED | OUTPATIENT
Start: 2020-05-30 | End: 2020-05-30

## 2020-05-30 RX ORDER — SODIUM CHLORIDE, SODIUM LACTATE, POTASSIUM CHLORIDE, CALCIUM CHLORIDE 600; 310; 30; 20 MG/100ML; MG/100ML; MG/100ML; MG/100ML
INJECTION, SOLUTION INTRAVENOUS CONTINUOUS
Status: DISCONTINUED | OUTPATIENT
Start: 2020-05-30 | End: 2020-05-30

## 2020-05-30 RX ORDER — NICOTINE POLACRILEX 4 MG
15 LOZENGE BUCCAL PRN
Status: DISCONTINUED | OUTPATIENT
Start: 2020-05-30 | End: 2020-06-04 | Stop reason: HOSPADM

## 2020-05-30 RX ORDER — PROMETHAZINE HYDROCHLORIDE 25 MG/1
12.5 TABLET ORAL EVERY 6 HOURS PRN
Status: DISCONTINUED | OUTPATIENT
Start: 2020-05-30 | End: 2020-06-04 | Stop reason: HOSPADM

## 2020-05-30 RX ORDER — ONDANSETRON 2 MG/ML
4 INJECTION INTRAMUSCULAR; INTRAVENOUS EVERY 6 HOURS PRN
Status: DISCONTINUED | OUTPATIENT
Start: 2020-05-30 | End: 2020-06-04 | Stop reason: HOSPADM

## 2020-05-30 RX ORDER — LAMOTRIGINE 25 MG/1
75 TABLET ORAL 2 TIMES DAILY
Status: DISCONTINUED | OUTPATIENT
Start: 2020-05-30 | End: 2020-06-04 | Stop reason: HOSPADM

## 2020-05-30 RX ORDER — POLYETHYLENE GLYCOL 3350 17 G/17G
102 POWDER, FOR SOLUTION ORAL NIGHTLY
Status: DISCONTINUED | OUTPATIENT
Start: 2020-05-30 | End: 2020-06-04 | Stop reason: HOSPADM

## 2020-05-30 RX ADMIN — ENOXAPARIN SODIUM 40 MG: 40 INJECTION SUBCUTANEOUS at 09:19

## 2020-05-30 RX ADMIN — PANCRELIPASE 6000 UNITS: 30000; 6000; 19000 CAPSULE, DELAYED RELEASE PELLETS ORAL at 09:19

## 2020-05-30 RX ADMIN — ALBUMIN (HUMAN) 25 G: 0.25 INJECTION, SOLUTION INTRAVENOUS at 22:33

## 2020-05-30 RX ADMIN — PANCRELIPASE 6000 UNITS: 30000; 6000; 19000 CAPSULE, DELAYED RELEASE PELLETS ORAL at 20:25

## 2020-05-30 RX ADMIN — LAMOTRIGINE 75 MG: 25 TABLET ORAL at 02:37

## 2020-05-30 RX ADMIN — METHADONE HYDROCHLORIDE 10 MG: 10 TABLET ORAL at 02:37

## 2020-05-30 RX ADMIN — ONDANSETRON 4 MG: 2 INJECTION INTRAMUSCULAR; INTRAVENOUS at 12:31

## 2020-05-30 RX ADMIN — Medication 10 ML: at 22:35

## 2020-05-30 RX ADMIN — ALBUMIN (HUMAN) 25 G: 0.25 INJECTION, SOLUTION INTRAVENOUS at 11:44

## 2020-05-30 RX ADMIN — METHADONE HYDROCHLORIDE 10 MG: 10 TABLET ORAL at 22:34

## 2020-05-30 RX ADMIN — LAMOTRIGINE 75 MG: 25 TABLET ORAL at 11:44

## 2020-05-30 RX ADMIN — SODIUM CHLORIDE, POTASSIUM CHLORIDE, SODIUM LACTATE AND CALCIUM CHLORIDE: 600; 310; 30; 20 INJECTION, SOLUTION INTRAVENOUS at 02:40

## 2020-05-30 RX ADMIN — PANCRELIPASE 6000 UNITS: 30000; 6000; 19000 CAPSULE, DELAYED RELEASE PELLETS ORAL at 11:44

## 2020-05-30 RX ADMIN — METHADONE HYDROCHLORIDE 10 MG: 10 TABLET ORAL at 09:19

## 2020-05-30 RX ADMIN — LAMOTRIGINE 75 MG: 25 TABLET ORAL at 22:34

## 2020-05-30 ASSESSMENT — PAIN DESCRIPTION - PROGRESSION
CLINICAL_PROGRESSION: NOT CHANGED

## 2020-05-30 ASSESSMENT — PAIN SCALES - GENERAL
PAINLEVEL_OUTOF10: 5
PAINLEVEL_OUTOF10: 4
PAINLEVEL_OUTOF10: 5

## 2020-05-30 ASSESSMENT — PAIN DESCRIPTION - DESCRIPTORS
DESCRIPTORS: CONSTANT

## 2020-05-30 ASSESSMENT — ENCOUNTER SYMPTOMS
ABDOMINAL PAIN: 0
NAUSEA: 0
VOMITING: 0
SHORTNESS OF BREATH: 0

## 2020-05-30 ASSESSMENT — PAIN - FUNCTIONAL ASSESSMENT: PAIN_FUNCTIONAL_ASSESSMENT: ACTIVITIES ARE NOT PREVENTED

## 2020-05-30 ASSESSMENT — PAIN DESCRIPTION - ORIENTATION: ORIENTATION: MID

## 2020-05-30 ASSESSMENT — PAIN DESCRIPTION - LOCATION
LOCATION: ABDOMEN

## 2020-05-30 ASSESSMENT — PAIN DESCRIPTION - PAIN TYPE
TYPE: CHRONIC PAIN

## 2020-05-30 ASSESSMENT — PAIN DESCRIPTION - FREQUENCY: FREQUENCY: CONTINUOUS

## 2020-05-30 ASSESSMENT — PAIN DESCRIPTION - ONSET: ONSET: ON-GOING

## 2020-05-30 NOTE — CONSULTS
Gastroenterology Consult Note      Patient: Nat Gallardo  : 1985  [facility]#:  [id number]    Date:  2020    Subjective:       History of Present Illness  Patient is a 29 y.o.  female admitted with Colitis [K52.9]  Colitis [K52.9] who is seen in consult for colitis. She is chronically ill woman well-known to me who underwent total pancreatectomy with islet cell transplant for intractable recurrent pancreatitis in 0741 complicated by IDDM, GI dysmotility, and s/p multiple additional surgeries for gastric dysmotility, SBO, etc.  Recent surgical abd hernia repair complicated by wound infection. Now admitted for 10 day decline in function to point of inability to stand/walk without assistance, weakness, fatigue and severe anorexia/weight loss. She has a J-tube in place, but unable to tolerate feeding rate. Also recent dx of Cirrhosis complicated by small amount ascites and significant peripheral edema. Last week felt to have cellulitis of left leg and recently completed 10 day course of doxycycline with resolution. Main c/o is fatigue and weakness. Some baseline abd pain. Having 2 BM/day -- has history of constipation requiring daily Miralax, but not taking for past week due to frequent BM's. Denies blood in stools or abd cramping. No fevers, chills, sweats, cough, SOB, or URI symptoms. Some arthralgias/myalgias.        Past Medical History:   Diagnosis Date    Abdominal pain     with nausea and weight loss    Amenorrhea     Carbapenem-resistant bacterial infection 2017    urine    Chronic pancreatitis ([facility])     R/T CFTR MUTATION    Diabetes mellitus ([facility]     since pancreas removed    Digestive disorder     Gastroparesis     Hepatic steatosis     History of blood transfusion 2017    MARIANA (iron deficiency anemia)     Kidney stone     Kidney stone 2017    PICC (peripherally inserted central catheter) in place     NOT CURRENT 3-2-18    Seizure [facility])     ONE EPISODE IN 2011      Past Surgical History:   Procedure Laterality Date    CHOLECYSTECTOMY      COLECTOMY      COLONOSCOPY N/A 7/25/2019    COLONOSCOPY performed by Ronda Vila MD at [facility] N/A 11/6/2019    COLONOSCOPY DIAGNOSTIC performed by Lul Kimball MD at [facility]  10/31/2017    CYSTOSCOPY, LEFT URETERAL STENT PLACEMENT, LEFT URETEROSCOPY, LEFT RETROGRADE PYELOGRAM    CYSTOSCOPY  11/09/2017    CYSTOSCOPY, LEFT URETEROSCOPY, LEFT RETROGRADE, STENT REMOVAL    ENTEROSCOPY N/A 11/6/2019    ENTEROSCOPY PUSH DIAGNOSTIC performed by Lul Kimball MD at [facility]  8/06    removal with islet cell transplant    SIGMOIDOSCOPY N/A 4/29/2020    SIGMOIDOSCOPY DIAGNOSTIC FLEXIBLE performed by Ronda Vila MD at [facility]      8/06    STOMACH SURGERY  12/06    half of stomach removed    TUNNELED VENOUS PORT PLACEMENT Left 03/09/2018    UPPER GASTROINTESTINAL ENDOSCOPY  11/30/2010    nasojejunostomy tube placement    UPPER GASTROINTESTINAL ENDOSCOPY N/A 7/25/2019    EGD BIOPSY performed by Ronda Vila MD at [address] [place] [address]      Past Endoscopic History: Flexible sigmoidoscopy 4/29/2020: Normal except for anal fissure. EGD/Colonoscopy 11/6/2019: normal EGD s/p antrectomy with Bryce gastojejunostomy. Normal colon and terminal ileum    Admission Meds  No current facility-administered medications on file prior to encounter.       Current Outpatient Medications on File Prior to Encounter   Medication Sig Dispense Refill    spironolactone (ALDACTONE) 25 MG tablet Take 25 mg by mouth daily      furosemide (LASIX) 40 MG tablet Take 40 mg by mouth daily Indications: 40-80mg daily      acetaminophen (TYLENOL) 500 MG tablet Take 500 mg by mouth every 6 hours as needed for Pain      promethazine (PHENERGAN) 25 MG tablet Take 12.5 mg and peripheral edema. Thanks for your care.     Yosef Bourne MD  [address]  5/30/2020

## 2020-05-30 NOTE — ED PROVIDER NOTES
and transverse colon consistent with colitis. This was not seen in the previous evaluation. 2. Jejunostomy tube in adequate position. 3. Mild ascites. 4. Atelectatic changes right lower lobe. 5. Redemonstration of fatty liver. Medications administered:  Medications   lamoTRIgine (LAMICTAL) tablet 75 mg (has no administration in time range)   methadone (DOLOPHINE) tablet 10 mg (has no administration in time range)   lipase-protease-amylase (CREON) delayed release capsule 6,000 Units (has no administration in time range)   spironolactone (ALDACTONE) tablet 25 mg (has no administration in time range)   polyethylene glycol (GLYCOLAX) packet 17 g (has no administration in time range)   sodium chloride flush 0.9 % injection 10 mL (has no administration in time range)   sodium chloride flush 0.9 % injection 10 mL (has no administration in time range)   promethazine (PHENERGAN) tablet 12.5 mg (has no administration in time range)     Or   ondansetron (ZOFRAN) injection 4 mg (has no administration in time range)   enoxaparin (LOVENOX) injection 40 mg (has no administration in time range)   potassium chloride 10 mEq/100 mL IVPB (Peripheral Line) (has no administration in time range)   magnesium sulfate 1 g in dextrose 5% 100 mL IVPB (has no administration in time range)   sodium phosphate 7.71 mmol in dextrose 5 % 250 mL IVPB (has no administration in time range)     Or   sodium phosphate 15.39 mmol in dextrose 5 % 250 mL IVPB (has no administration in time range)   lactated ringers infusion (has no administration in time range)   ondansetron (ZOFRAN-ODT) disintegrating tablet 4 mg (has no administration in time range)   iopamidol (ISOVUE-370) 76 % injection 75 mL (75 mLs Intravenous Given 5/29/20 2104)   ciprofloxacin (CIPRO) IVPB 400 mg (0 mg Intravenous Stopped 5/29/20 2314)   albumin human 25 % IV solution 12.5 g (0 g Intravenous Stopped 5/30/20 0028)     FINAL IMPRESSION:    1. Colitis    2. Hypoalbuminemia    3. Lightheadedness    4.  Chronic anemia       DISPOSITION Admitted 05/29/2020 11:05:53 PM       Sandra Beckwith MD  05/30/20 7049

## 2020-05-30 NOTE — PROGRESS NOTES
Nutrition Assessment (Enteral Nutrition)    Type and Reason for Visit: Initial, Consult    Nutrition Recommendations:   1. Continue current PO diet and record % consumed at each meal.  2. \"Diet: Tube feed continuous/ with diet\". Initiate Osmolite 1.5 (1.5 calorie without fiber formula) at 15 mL/hr via J-tube. Do not advance rate until tolerance and lytes evaluated on 5/31. 3. Monitor blood glucose. 4. Recommend 30 mL H20 q 4 hours for tube maintenance. Increase flush if Na increases greater than 145 mEq/L.  5. Monitor closely and correct lytes (K, Phos, Mg) before progressing TF to goal d/t risk of refeeding syndrome (hx extended inadequate nutritional intake). 6. Do not check residuals in j-tube. 7. Monitor for tolerance (bowel habits, N/V, cramping). 8. Daily weights. Nutrition Assessment: Pt with hx of total pancreatectomy. Consult received for tube feed order and management. Pt has j-tube and usually receives Diabetisource at home (~40mL/hr hx12 hr) equivalent to 2 cans. Tries to do 3, but usually doesn't tolerate. Reports wt of 125lb back in December and then progressive loss since then. Admitting wt 106lb has been usual wt lately. Today's wt is 111lb. IV fluids held d/t fluid retention. Pt was receiving tube feed right up until admission however she does remain at risk for refeeding with initiation of tube feeding, no labs available to evaluate for today. 5/29 Na 133, 5/27 Mg 1.9. Pt agreeable to start Osmolite 1.5 and run continuous until tolerance observed then will try 12 hr. cyclic. Malnutrition Assessment:  · Malnutrition Status: Meets the criteria for severe malnutrition  · Context: Chronic illness  · Findings of the 6 clinical characteristics of malnutrition (Minimum of 2 out of 6 clinical characteristics is required to make the diagnosis of moderate or severe Protein Calorie Malnutrition based on AND/ASPEN Guidelines):  1.  Energy Intake-Less than or equal to 75% of estimated energy requirement, Greater than or equal to 1 month    2. Weight Loss-10% loss or greater, (5-6 months)  3. Fat Loss-Unable to assess,    4. Muscle Loss-Unable to assess,    5. Fluid Accumulation-Mild fluid accumulation, Extremities  6.  Strength-Not measured    Nutrition Risk Level: High    Nutrition Needs:  · Estimated Daily Total Kcal: 1440 -1680  · Estimated Daily Protein (g): 58 - 869  · Estimated Daily Fluid (ml/day): 1 mL/kcal    Nutrition Diagnosis:   · Problem: Severe malnutrition  · Etiology: related to Insufficient energy/nutrient consumption     Signs and symptoms:  as evidenced by Weight loss greater than or equal to 10% in 6 months, Diet history of poor intake    Objective Information:  · Nutrition-Focused Physical Findings: J-tube. c/o nausea; active BS, nonpitting +1 BLE; lasix, aldactone, glycolax, creon  · Wound Type: Surgical Wound  · Current Nutrition Therapies:  · Oral Diet Orders: Low Fat, Carb Control 4 Carbs/Meal   · Oral Diet intake: 0%  · Tube Feeding (TF) Orders:   · Feeding Route: Jejunostomy  · Formula: 1.5 Calorie without Fiber  · Duration: Continuous  · Goal TF & Flush Orders Provides: To meet full nutrition needs with tube feed recommend: Osmolite 1.5, 1080 mL total volume to provide 1620 kcal, 68 g protein, 822 mL free water.   135 mL q4 hr to provide full water needs for 1 mL/1kcal  · Anthropometric Measures:  · Ht: 5' 6\" (167.6 cm)   · Current Body Wt: 111 lb (50.3 kg)  · Admission Body Wt: 106 lb (48.1 kg)  · Weight Change: 5 - 6 months loss of 19 lb / 15% based on stated wt   · Ideal Body Wt: 130 lb (59 kg)   · BMI Classification: BMI 18.5 - 24.9 Normal Weight    Nutrition Interventions:   Continue current diet, Start Tube Feeding  Continued Inpatient Monitoring    Nutrition Evaluation:   · Evaluation: Goals set   · Goals: porfirio to TF at goal   · Monitoring: Meal Intake, TF Intake, Diet Tolerance, TF Tolerance, Weight, Pertinent Labs, Nausea or

## 2020-05-30 NOTE — PLAN OF CARE
Problem: Pain:  Goal: Pain level will decrease  Description: Pain level will decrease  5/30/2020 0803 by Kelli Coles RN  Outcome: Ongoing  Note: Admitted with pain and fatigue. Receiving home doses of pain medication. Problem: Falls - Risk of:  Goal: Will remain free from falls  Description: Will remain free from falls  5/30/2020 0803 by Kelli Coles RN  Outcome: Ongoing  Note: High fall risk per Jimenez scale. Refusing bed alarm, mother at bedside to assist with needs. Non-skid footwear on patient, belongings within reach, bed in lowest position.

## 2020-05-30 NOTE — PROGRESS NOTES
Dr. Shannon Diallo paged regarding patient's home insulin. Patient takes Novolog at home with low sliding scale. Waiting for response.

## 2020-05-30 NOTE — PROGRESS NOTES
[facility] PROGRESS NOTE    5/30/2020 1:10 PM        Name: Kashmir Dacosta . Admitted: 5/29/2020  Primary Care Provider: Diego Frias (Tel: [phone])                        29years old  female with history of chronic recurrent pancreatitis, total pancreatectomy, partial gastrectomy, feeding jejunostomy and severe malnutrition presented with nonspecific symptoms such as fatigue, weakness and lower extremity edema, history of constipation taking MiraLAX found to have ascending and transverse colon colitis. Admitted earlier this morning, empiric antibiotic treatment started gastroenterology service consulted.        Noé Tate MD   5/30/2020 1:10 PM

## 2020-05-30 NOTE — PLAN OF CARE
Nutrition Problem: Severe malnutrition  Intervention: Food and/or Nutrient Delivery: Continue current diet, Start Tube Feeding  Nutritional Goals: porfirio to TF at goal

## 2020-05-30 NOTE — H&P
[place] [address]                     [facility], [address]                              HISTORY AND PHYSICAL    PATIENT NAME: Milton Garcia                    :        1985  MED REC NO:   [id number]                          ROOM:       [id number]  ACCOUNT NO:   [de-identified]                           ADMIT DATE: 2020  PROVIDER:     Adriana Slade MD    DATE OF SERVICE:  I obtained the history and performed physical exam on  the patient in the emergency room on 2020. CHIEF COMPLAINT:  Abdominal pain and fatigue. HISTORY OF PRESENT ILLNESS:  The patient is a 69-year-old   female presenting to the hospital with chief complaints of a week and  half history of subacute onset of gradually progressive increasing  lightheadedness, fatigue, abdominal pain, cramping, bilateral lower  extremity swelling and just not feeling well without any obvious nausea  or vomiting, but the patient was getting outpatient albumin infusion. No other constitutional symptoms. She just states that she has not been  able to get around as much because of her dizziness. PAST MEDICAL/PAST SURGICAL HISTORY:  Recent abdominal surgery. The  patient has had numerous abdominal surgeries and currently has had a  feeding jejunostomy. ALLERGIC HISTORY:  ADHESIVE TAPE. FAMILY HISTORY:  Reviewed by me and is currently noncontributory. SOCIAL HISTORY:  Lives at home. No illicit substance use. MEDICATIONS:  Aldactone, Lasix, Tylenol, Phenergan, GlycoLax, Lamictal,  Dolophine, Creon, Zofran, NovoLog. REVIEW OF SYSTEMS:  The patient's review of systems is significant for  the nausea, vomiting and fatigue and per the history of present illness. All other systems have been reviewed and are negative except for the  history of present illness.     PHYSICAL EXAMINATION:  VITAL SIGNS:  Temperature 98.2, respiratory rate 20, pulse 92, blood  pressure 94/62, saturating 97%. CNS:  The patient is alert, awake and oriented to time, place and  person. PSYCH:  The patient is cooperative. EYES:  Pupils are reactive to light. ENT:  No oral mucosal lesions. RESPIRATORY SYSTEM:  Symmetrical chest wall movements. ABDOMEN:  Soft. The patient has got some midline exploratory laparotomy  scar/surgical scar that is well healed. The patient has got a  left-sided paramedian _____ jejunostomy tube. The site looks clean, dry  and intact. There is some diffuse generalized mild tenderness. MUSCULOSKELETAL:  No acute deformities. SKIN:  Appears without rashes or lesions. Does appear significantly  pale. The patient has got bilateral lower extremity pitting edema. DIAGNOSTIC DATA:  CT abdomen and pelvis with IV contrast shows ascending  and transverse colon thickening consistent with colitis, adequate  jejunostomy positioning noted. UA, no evidence of infection. Beta hCG  negative. Anion gap 7, glucose 133, sodium 133, potassium 3.6, chloride  96, calcium 8.0. Lipase 6.0. White count 11.1, hemoglobin of 8.0.    CONSULTATIONS:  Gastroenterology. REVIEW OF PREVIOUS MEDICAL RECORDS:  Shows an echo from 2017 showing an  LV ejection fraction of 55%. ASSESSMENT:  1. Colitis. 2.  Anemia, chronic. 3.  Protein calorie malnutrition. 4.  Seizure disorder. PLAN OF CARE:  The patient is admitted to the observation status. The  patient did receive an albumin infusion. The patient did receive  ciprofloxacin for the colitis. GI consult has been requested. We will  continue the patient on her home dose of Lamictal.  _____ her home dose  of Dolophine. We will continue with LR, IV hydration. Further  management will be per GI recommendation. CODE STATUS:  Full. EXPECTED LENGTH OF STAY:  Less than two midnights based on plan of care  above. DISPOSITION:  Observation.         Krystin Hsu MD    D: 05/30/2020 5:25:17       T: 05/30/2020 6:05:48 [email or web address]  Job#: [id number]     Doc#: [id number]    CC:

## 2020-05-30 NOTE — ED NOTES
Pt presented from home c/o weakness, dizziness, and fatigue x 1 1/2 weeks. Pt sent here by her PCP Dr. Willian Nichols for evaluation and admission. Pt is AxOx4, resting in stretcher bed, VSS. Pt's left chest wall port accessed, Pt medicated per order, will continue to monitor.       Ana Collins RN  05/30/20 [id number]

## 2020-05-31 LAB
GLUCOSE BLD-MCNC: 207 MG/DL (ref 70–99)
GLUCOSE BLD-MCNC: 210 MG/DL (ref 70–99)
GLUCOSE BLD-MCNC: 236 MG/DL (ref 70–99)
GLUCOSE BLD-MCNC: 242 MG/DL (ref 70–99)
MAGNESIUM: 2.1 MG/DL (ref 1.8–2.4)
PERFORMED ON: ABNORMAL
PHOSPHORUS: 2.4 MG/DL (ref 2.5–4.9)
POTASSIUM SERPL-SCNC: 3.2 MMOL/L (ref 3.5–5.1)
SARS-COV-2: NOT DETECTED
SOURCE: NORMAL

## 2020-05-31 PROCEDURE — 6360000002 HC RX W HCPCS: Performed by: INTERNAL MEDICINE

## 2020-05-31 PROCEDURE — 6370000000 HC RX 637 (ALT 250 FOR IP): Performed by: INTERNAL MEDICINE

## 2020-05-31 PROCEDURE — 83036 HEMOGLOBIN GLYCOSYLATED A1C: CPT

## 2020-05-31 PROCEDURE — G0378 HOSPITAL OBSERVATION PER HR: HCPCS

## 2020-05-31 PROCEDURE — P9047 ALBUMIN (HUMAN), 25%, 50ML: HCPCS | Performed by: INTERNAL MEDICINE

## 2020-05-31 PROCEDURE — 94760 N-INVAS EAR/PLS OXIMETRY 1: CPT

## 2020-05-31 PROCEDURE — 96376 TX/PRO/DX INJ SAME DRUG ADON: CPT

## 2020-05-31 PROCEDURE — 6370000000 HC RX 637 (ALT 250 FOR IP): Performed by: HOSPITALIST

## 2020-05-31 PROCEDURE — 96366 THER/PROPH/DIAG IV INF ADDON: CPT

## 2020-05-31 PROCEDURE — 97165 OT EVAL LOW COMPLEX 30 MIN: CPT

## 2020-05-31 PROCEDURE — 96367 TX/PROPH/DG ADDL SEQ IV INF: CPT

## 2020-05-31 PROCEDURE — 83735 ASSAY OF MAGNESIUM: CPT

## 2020-05-31 PROCEDURE — 2580000003 HC RX 258: Performed by: INTERNAL MEDICINE

## 2020-05-31 PROCEDURE — 97530 THERAPEUTIC ACTIVITIES: CPT

## 2020-05-31 PROCEDURE — 84132 ASSAY OF SERUM POTASSIUM: CPT

## 2020-05-31 PROCEDURE — 96368 THER/DIAG CONCURRENT INF: CPT

## 2020-05-31 PROCEDURE — 96372 THER/PROPH/DIAG INJ SC/IM: CPT

## 2020-05-31 PROCEDURE — 2500000003 HC RX 250 WO HCPCS: Performed by: INTERNAL MEDICINE

## 2020-05-31 PROCEDURE — 97161 PT EVAL LOW COMPLEX 20 MIN: CPT

## 2020-05-31 PROCEDURE — 87040 BLOOD CULTURE FOR BACTERIA: CPT

## 2020-05-31 PROCEDURE — 1200000000 HC SEMI PRIVATE

## 2020-05-31 PROCEDURE — 84100 ASSAY OF PHOSPHORUS: CPT

## 2020-05-31 RX ORDER — CIPROFLOXACIN 2 MG/ML
400 INJECTION, SOLUTION INTRAVENOUS EVERY 12 HOURS
Status: DISCONTINUED | OUTPATIENT
Start: 2020-05-31 | End: 2020-06-02

## 2020-05-31 RX ORDER — POTASSIUM CHLORIDE 29.8 MG/ML
20 INJECTION INTRAVENOUS
Status: COMPLETED | OUTPATIENT
Start: 2020-05-31 | End: 2020-05-31

## 2020-05-31 RX ADMIN — METHADONE HYDROCHLORIDE 10 MG: 10 TABLET ORAL at 09:59

## 2020-05-31 RX ADMIN — SPIRONOLACTONE 50 MG: 25 TABLET ORAL at 09:59

## 2020-05-31 RX ADMIN — POLYETHYLENE GLYCOL 3350 102 G: 17 POWDER, FOR SOLUTION ORAL at 21:21

## 2020-05-31 RX ADMIN — CIPROFLOXACIN 400 MG: 2 INJECTION, SOLUTION INTRAVENOUS at 12:36

## 2020-05-31 RX ADMIN — CIPROFLOXACIN 400 MG: 2 INJECTION, SOLUTION INTRAVENOUS at 22:36

## 2020-05-31 RX ADMIN — ALBUMIN (HUMAN) 25 G: 0.25 INJECTION, SOLUTION INTRAVENOUS at 08:41

## 2020-05-31 RX ADMIN — ENOXAPARIN SODIUM 40 MG: 40 INJECTION SUBCUTANEOUS at 09:59

## 2020-05-31 RX ADMIN — POTASSIUM CHLORIDE 20 MEQ: 29.8 INJECTION, SOLUTION INTRAVENOUS at 09:49

## 2020-05-31 RX ADMIN — METRONIDAZOLE 500 MG: 500 INJECTION, SOLUTION INTRAVENOUS at 21:22

## 2020-05-31 RX ADMIN — INSULIN LISPRO 2 UNITS: 100 INJECTION, SOLUTION INTRAVENOUS; SUBCUTANEOUS at 12:38

## 2020-05-31 RX ADMIN — ONDANSETRON 4 MG: 2 INJECTION INTRAMUSCULAR; INTRAVENOUS at 14:42

## 2020-05-31 RX ADMIN — POTASSIUM CHLORIDE 20 MEQ: 29.8 INJECTION, SOLUTION INTRAVENOUS at 12:38

## 2020-05-31 RX ADMIN — INSULIN LISPRO 1 UNITS: 100 INJECTION, SOLUTION INTRAVENOUS; SUBCUTANEOUS at 21:35

## 2020-05-31 RX ADMIN — METHADONE HYDROCHLORIDE 10 MG: 10 TABLET ORAL at 21:22

## 2020-05-31 RX ADMIN — PANCRELIPASE 6000 UNITS: 30000; 6000; 19000 CAPSULE, DELAYED RELEASE PELLETS ORAL at 12:36

## 2020-05-31 RX ADMIN — PANCRELIPASE 6000 UNITS: 30000; 6000; 19000 CAPSULE, DELAYED RELEASE PELLETS ORAL at 09:59

## 2020-05-31 RX ADMIN — ALBUMIN (HUMAN) 25 G: 0.25 INJECTION, SOLUTION INTRAVENOUS at 23:51

## 2020-05-31 RX ADMIN — PANCRELIPASE 6000 UNITS: 30000; 6000; 19000 CAPSULE, DELAYED RELEASE PELLETS ORAL at 17:38

## 2020-05-31 RX ADMIN — Medication 10 ML: at 12:48

## 2020-05-31 RX ADMIN — ALBUMIN (HUMAN) 25 G: 0.25 INJECTION, SOLUTION INTRAVENOUS at 17:04

## 2020-05-31 RX ADMIN — LAMOTRIGINE 75 MG: 25 TABLET ORAL at 21:23

## 2020-05-31 RX ADMIN — METRONIDAZOLE 500 MG: 500 INJECTION, SOLUTION INTRAVENOUS at 12:37

## 2020-05-31 RX ADMIN — INSULIN LISPRO 2 UNITS: 100 INJECTION, SOLUTION INTRAVENOUS; SUBCUTANEOUS at 10:00

## 2020-05-31 RX ADMIN — Medication 10 ML: at 21:28

## 2020-05-31 RX ADMIN — LAMOTRIGINE 75 MG: 25 TABLET ORAL at 10:00

## 2020-05-31 ASSESSMENT — PAIN DESCRIPTION - PROGRESSION: CLINICAL_PROGRESSION: NOT CHANGED

## 2020-05-31 ASSESSMENT — PAIN SCALES - GENERAL
PAINLEVEL_OUTOF10: 5
PAINLEVEL_OUTOF10: 4
PAINLEVEL_OUTOF10: 5
PAINLEVEL_OUTOF10: 4

## 2020-05-31 ASSESSMENT — PAIN DESCRIPTION - LOCATION
LOCATION: ABDOMEN
LOCATION: ABDOMEN

## 2020-05-31 ASSESSMENT — PAIN DESCRIPTION - DESCRIPTORS: DESCRIPTORS: CONSTANT

## 2020-05-31 ASSESSMENT — PAIN DESCRIPTION - ONSET: ONSET: ON-GOING

## 2020-05-31 ASSESSMENT — PAIN DESCRIPTION - PAIN TYPE
TYPE: CHRONIC PAIN
TYPE: CHRONIC PAIN

## 2020-05-31 ASSESSMENT — PAIN - FUNCTIONAL ASSESSMENT: PAIN_FUNCTIONAL_ASSESSMENT: ACTIVITIES ARE NOT PREVENTED

## 2020-05-31 ASSESSMENT — PAIN DESCRIPTION - ORIENTATION: ORIENTATION: MID

## 2020-05-31 ASSESSMENT — PAIN DESCRIPTION - FREQUENCY: FREQUENCY: CONTINUOUS

## 2020-05-31 NOTE — PROGRESS NOTES
Patient with complaints of nausea and \"fullness\" in her stomach. Tube feed rate decreased to 15mL/hour at this time.

## 2020-05-31 NOTE — PROGRESS NOTES
Physical Therapy    Facility/Department: [address] NURSING  Initial Assessment    NAME: Anuel Rome  : 1985  MRN: [id number]    Date of Service: 2020    Discharge Recommendations: Anuel Rome scored a 18/24 on the AM-PAC short mobility form. Current research shows that an AM-PAC score of 18 or greater is typically associated with a discharge to the patient's home setting. Based on the patient's AM-PAC score and their current functional mobility deficits, it is recommended that the patient have 2-3 sessions per week of Physical Therapy at d/c to increase the patient's independence. At this time, this patient demonstrates the endurance and safety to discharge home with home health PT and a follow up treatment frequency of 2-3x/wk. Please see assessment section for further patient specific details. If patient discharges prior to next session this note will serve as a discharge summary. Please see below for the latest assessment towards goals. Home with assist PRN, S Level 1   PT Equipment Recommendations  Equipment Needed: No    Assessment   Body structures, Functions, Activity limitations: Decreased functional mobility ; Decreased strength;Decreased endurance  Assessment: Patient was functionally independent prior to admission, presently weaker and requiring assistance with transfers and ambulation. Will follow during acute stay. Currently recommend home health PT at d/c. Recommendations to be updated as case evolves. Treatment Diagnosis: dec'd functional mobility  Prognosis: Good  Decision Making: Low Complexity  History: As above. Exam: functional mobility assessment  Clinical Presentation: Stable. PT Education: Elma Adamson [facility]  Patient Education: Patient verbalizes understanding. Barriers to Learning: None evident.   REQUIRES PT FOLLOW UP: Yes  Activity Tolerance  Activity Tolerance: Patient Tolerated treatment well;Patient limited by fatigue;Patient limited by 4/29/2020). Response To Previous Treatment: Not applicable  Family / Caregiver Present: Yes(mother)  Follows Commands: Within Functional Limits  General Comment  Comments: Patient supine. Subjective  Subjective: Barbie Shahid is a 29 y.o. female patient presents emergency department for evaluation of lightheadedness and increased fatigue for the past week and a half. Patient is under the care of Dr. Britney Villegas. He wanted to have her direct admitted to the hospital but due to [facility] restrictions she had to come through the emergency department. Patient has been getting albumin transfusions outpatient. She received 50 g of albumin already this afternoon. Patient states she feels so lightheaded and dizzy that she is not able to walk and has to scoot around on the floor. She denies any new abdominal pain. Patient has had multiple abdominal surgeries and has a jejunostomy tube. Patient states she is able to tolerate food and liquids by mouth but is also receiving tube feeds. Patient states occasionally the tube feeds make her feel nauseated however she has not vomited today. Patient was recently treated with antibiotics for bilateral lower leg cellulitis however this has resolved. Patient does still have swelling to bilateral lower extremities however has decreased from where it was at a few weeks ago. She denies any cough or shortness of breath or fever at home. Patient has a significant surgical history including appendectomy, pancreatectomy, small bowel obstructions and most recently surgery to remove adhesions and correct hernia mesh issue.   Pain Screening  Patient Currently in Pain: Yes  Pain Assessment  Pain Assessment: 0-10  Pain Level: 4  Patient's Stated Pain Goal: No pain  Pain Type: Chronic pain  Pain Location: Abdomen  Pain Orientation: Mid  Pain Descriptors: Constant  Pain Frequency: Continuous  Pain Onset: On-going  Clinical Progression: Not changed  Functional Pain Assessment: Yes  Ambulation 1  Surface: level tile  Device: No Device  Assistance: Contact guard assistance  Gait Deviations: Slow Yaritza  Distance: 15'     Balance  Posture: Good  Sitting - Static: Good  Sitting - Dynamic: Good  Standing - Static: Good  Standing - Dynamic: Fair;+      Plan   Plan  Times per week: 3-5  Times per day: Daily  Current Treatment Recommendations: Strengthening, Functional Mobility Training, Gait Training, Safety Education & Training, Patient/Caregiver Education & Training  Safety Devices  Type of devices: Call light within reach, Patient at risk for falls, Bed alarm in place, Nurse notified, Left in bed  Restraints  Initially in place: No    AM-PAC Score  AM-PAC Inpatient Mobility Raw Score : 18 (05/31/20 1245)  AM-PAC Inpatient T-Scale Score : 43.63 (05/31/20 1245)  Mobility Inpatient CMS 0-100% Score: 46.58 (05/31/20 1245)  Mobility Inpatient CMS G-Code Modifier : CK (05/31/20 1245)     Goals  Short term goals  Time Frame for Short term goals: Discharge. Short term goal 1: Patient will perform all transfers independently. Short term goal 2: Patient will perform ambulation 48' independently. Patient Goals   Patient goals : Return home, get stronger.      Therapy Time   Individual Concurrent Group Co-treatment   Time In 4503         Time Out 1202         Minutes 24         Timed Code Treatment Minutes: [address], ,DPT, ATC-R [id number]

## 2020-05-31 NOTE — PROGRESS NOTES
5. Redemonstration of fatty liver. Assessment/Plan:    Active Hospital Problems    Diagnosis    Colitis [K52.9]    Severe malnutrition ([facility] [address] [E43]     Colitis: noted on CT abd. On abx. GI on board    Hypoalbuminemia/fluid overload: on iv albumin and aldactone. Checking echo    Chronic pancreatitis s/p total pancreatectomy: on methadone    FEN: tube feeds    Chronic anemia    NSAH/cirrhosis: aldactone and albumin     Constipation: on bowel regimen     Leukocytosis:  Possibly colitis related. Had pseudomonas bacteriuria on 5/27 ER visit but UA at this time is ok. On cipro as above any way. monitor closely with abx.     DVT Prophylaxis:  lovenox   Diet: DIET LOW FAT; Carb Control: 4 carb choices (60 gms)/meal  Diet Tube Feed Continuous/Cyclic w/ Diet  Code Status: Full Code    PT/OT Eval Status: ordered    Dispo - inpt 1-2 more days     Karena Castellanos MD

## 2020-05-31 NOTE — PROGRESS NOTES
Brief Tube Feed / Nutrition Note       Spoke with RN regardig TF tolerance. Good tolerance no c/o abd pain, cramping, no n/v at 15 mL/hr. Recommend to adv rate to 25 mL/hr and in 8 hr adv to 35 mL/hr. Based on tolerance on 6/1/20, may be able to advance to goal of 45 mL/hr.  5/31: K+ 3.2, Mg 2.10, phos 2.4, gluc 210. Continue to monitor lytes and glucose.           Electronically signed by Artis Harman RD, LD on 5/31/20 at 11:44 AM EDT  Weekend Contact Number: [phone], leave name and callback number

## 2020-05-31 NOTE — PROGRESS NOTES
Occupational Therapy   Occupational Therapy Initial Assessment  Date: 2020   Patient Name: Kashmir Dacosta  MRN: [id number]     : 1985    Date of Service: 2020    Discharge Recommendations: Kashmir Dacosta scored a 19/24 on the AM-PAC ADL Inpatient form. Current research shows that an AM-PAC score of 18 or greater is typically associated with a discharge to the patient's home setting. Based on the patient's AM-PAC score, and their current ADL deficits, it is recommended that the patient have 2-3 sessions per week of Occupational Therapy at d/c to increase the patient's independence. At this time, this patient demonstrates the endurance and safety to discharge home with either home health or OP services depending on pt ability at discharge and a follow up treatment frequency of 2-3x/wk. Please see assessment section for further patient specific details. HOME HEALTH CARE: LEVEL 1 STANDARD    - Initial home health evaluation to occur within 24-48 hours, in patient home   - Therapy to evaluate with goal of regaining prior level of functioning   - Therapy to evaluate if patient has [id number] [facility] needs for personal care    If patient discharges prior to next session this note will serve as a discharge summary. Please see below for the latest assessment towards goals. OT Equipment Recommendations  Equipment Needed: No  Other: Pt reports she has shower chair and required equipment at home. Assessment   Performance deficits / Impairments: Decreased functional mobility ; Decreased ADL status; Decreased endurance;Decreased strength;Decreased high-level IADLs  Assessment: Pt is limited in the above areas impacting independence in ADLs and functional mobility. Pt would benefit from skilled inpatient OT services to address these deficits.   Treatment Diagnosis: Decreased ADL and functional mobility status due to colitis and chronic health issues  Prognosis: Good  Decision Making: Low presented with concern for weakness and fatigue. She has multiple chronic health problems, FTT, malnutrition, multiple abdominal surgeries including recent abdominal mesh procedure. Receiving albumen infusions as an outpatient. Continues with weakness. Sent to the ED for evaluation and admission by her GI doctor, Dr Karsten Alexander. Findings of colitis on imaging. LFT abnormalities appear close to her baseline, anemia is slightly worsened.   Family / Caregiver Present: Yes(mother)  Diagnosis: colitis  Subjective  Subjective: Pt supine in bed upon arrival; mother present; agreeable to eval.  Patient Currently in Pain: Yes  Pain Assessment  Pain Assessment: 0-10  Pain Level: 4  Patient's Stated Pain Goal: No pain  Pain Type: Chronic pain  Pain Location: Abdomen  Pain Orientation: Mid  Pain Descriptors: Constant  Pain Frequency: Continuous  Pain Onset: On-going  Clinical Progression: Not changed  Functional Pain Assessment: Activities are not prevented  Non-Pharmaceutical Pain Intervention(s): Rest  Pre Treatment Pain Screening  Intervention List: Patient able to continue with treatment  Vital Signs  Temp: 97 °F (36.1 °C)  Temp Source: Temporal  Pulse: 86  Heart Rate Source: Monitor  Resp: 16  BP: (!) 95/54  BP Location: Right upper arm  Level of Consciousness: Alert  MEWS Score: 2  Patient Currently in Pain: Yes  Oxygen Therapy  SpO2: 96 %  O2 Device: None (Room air)  Social/Functional History  Social/Functional History  Lives With: Family(generally lives alone, but has been and will be living with parents until she is better)  Type of Home: House(description will be of parents' home where pt will d/c; pt lives in ranch-style home generally)  Home Layout: Two level, Performs ADL's on one level  Home Access: Stairs to enter without rails  Entrance Stairs - Number of Steps: 1 KWAKU  Bathroom Shower/Tub: Walk-in shower, Shower chair with back  [employer]: Standard  Bathroom Equipment: Hand-held shower(home bathroom also has a walk-in shower with seat and grab bars)  Bathroom Accessibility: Accessible  Receives Help From: Family  ADL Assistance: Independent  Homemaking Assistance: Independent(has been requiring more help than usual recently)  Homemaking Responsibilities: Yes  Ambulation Assistance: Independent  Transfer Assistance: Independent  Active : Yes(pt has not driven much in the past 6 months due to health concerns)  Mode of Transportation: Car  Occupation: Full time employment  Type of occupation:  programs/social work  IADL Comments: not much energy for hobbies; enjoys shopping, getting nails done  Additional Comments: No falls reported in the past 6 months. Objective   Vision: Impaired  Vision Exceptions: Wears glasses at all times  Hearing: Within functional limits    Orientation  Overall Orientation Status: Within Functional Limits  Observation/Palpation  Posture: Good  Balance  Sitting Balance: Supervision  Standing Balance: Contact guard assistance  Standing Balance  Time: ~1 min  Activity: functional mobility  Functional Mobility  Functional - Mobility Device: No device  Activity: To/from bathroom  Assist Level: Contact guard assistance  Functional Mobility Comments: Pt reports mild sense of instability while walking; no LOB. ADL  Feeding: Dependent/Total(J tube)  Additional Comments: Pt declined participation in other ADLs due to completion this AM with her mother. Pt and her mother report she is still able to complete all ADLs independent to min A, but that it takes increased time due to fatigue.    Tone RUE  RUE Tone: Normotonic  Tone LUE  LUE Tone: Normotonic  Coordination  Movements Are Fluid And Coordinated: Yes     Bed mobility  Supine to Sit: Modified independent  Sit to Supine: Modified independent  Comment: increased time to complete  Transfers  Sit to stand: Stand by assistance  Stand to sit: Stand by assistance  Transfer Comments: EOB <> EOB; declined use of recliner  Vision - Basic Assessment  Prior Vision: Wears contacts  Patient Visual Report: No visual complaint reported. Cognition  Overall Cognitive Status: WFL  Perception  Overall Perceptual Status: WFL     Sensation  Overall Sensation Status: WFL        LUE AROM (degrees)  LUE AROM : WFL  RUE AROM (degrees)  RUE AROM : WFL  LUE Strength  Gross LUE Strength: WFL  LUE Strength Comment: some decreased strength, but has functional ability  RUE Strength  Gross RUE Strength: WFL  RUE Strength Comment: some decreased strength, but has functional ability                   Plan   Plan  Times per week: 3-5  Times per day: Daily  Specific instructions for Next Treatment: Pt declined shower today, but states she would like one soon. Current Treatment Recommendations: Strengthening, Endurance Training, Patient/Caregiver Education & Training, Self-Care / ADL, Home Management Training, Functional Mobility Training    G-Code     OutComes Score                                                  AM-PAC Score        AM-[facility] Inpatient Daily Activity Raw Score: 19 (05/31/20 1305)  AM-PAC Inpatient ADL T-Scale Score : 40.22 (05/31/20 1305)  ADL Inpatient CMS 0-100% Score: 42.8 (05/31/20 1305)  ADL Inpatient CMS G-Code Modifier : CK (05/31/20 1305)    Goals  Short term goals  Time Frame for Short term goals: d/c  Short term goal 1: Pt will complete functional mobility mod I. Short term goal 2: Pt will complete at least 5 minute functional ADL task in stance mod I.    Short term goal 3: Pt will complete LB ADLs mod I.  Long term goals  Time Frame for Long term goals : STG=LTG  Patient Goals   Patient goals : get better and go home       Therapy Time   Individual Concurrent Group Co-treatment   Time In 1110         Time Out 1150         Minutes 40         Timed Code Treatment Minutes: [address] [place] [id number]

## 2020-06-01 LAB
ABO/RH: NORMAL
ANION GAP SERPL CALCULATED.3IONS-SCNC: 8 MMOL/L (ref 3–16)
ANTIBODY SCREEN: NORMAL
BLOOD BANK DISPENSE STATUS: NORMAL
BLOOD BANK PRODUCT CODE: NORMAL
BPU ID: NORMAL
BUN BLDV-MCNC: 8 MG/DL (ref 7–20)
CALCIUM SERPL-MCNC: 8.5 MG/DL (ref 8.3–10.6)
CHLORIDE BLD-SCNC: 102 MMOL/L (ref 99–110)
CO2: 27 MMOL/L (ref 21–32)
CREAT SERPL-MCNC: 1 MG/DL (ref 0.6–1.1)
DESCRIPTION BLOOD BANK: NORMAL
ESTIMATED AVERAGE GLUCOSE: 139.9 MG/DL
GFR AFRICAN AMERICAN: >60
GFR NON-AFRICAN AMERICAN: >60
GLUCOSE BLD-MCNC: 133 MG/DL (ref 70–99)
GLUCOSE BLD-MCNC: 137 MG/DL (ref 70–99)
GLUCOSE BLD-MCNC: 140 MG/DL (ref 70–99)
GLUCOSE BLD-MCNC: 141 MG/DL (ref 70–99)
GLUCOSE BLD-MCNC: 188 MG/DL (ref 70–99)
HBA1C MFR BLD: 6.5 %
HCT VFR BLD CALC: 20.4 % (ref 36–48)
HEMOGLOBIN: 7 G/DL (ref 12–16)
LV EF: 68 %
LVEF MODALITY: NORMAL
MAGNESIUM: 2.4 MG/DL (ref 1.8–2.4)
MCH RBC QN AUTO: 32.8 PG (ref 26–34)
MCHC RBC AUTO-ENTMCNC: 34.2 G/DL (ref 31–36)
MCV RBC AUTO: 95.9 FL (ref 80–100)
PDW BLD-RTO: 17.7 % (ref 12.4–15.4)
PERFORMED ON: ABNORMAL
PHOSPHORUS: 1.9 MG/DL (ref 2.5–4.9)
PLATELET # BLD: 153 K/UL (ref 135–450)
PMV BLD AUTO: 9.3 FL (ref 5–10.5)
POTASSIUM SERPL-SCNC: 4.1 MMOL/L (ref 3.5–5.1)
RBC # BLD: 2.12 M/UL (ref 4–5.2)
SODIUM BLD-SCNC: 137 MMOL/L (ref 136–145)
WBC # BLD: 10.7 K/UL (ref 4–11)

## 2020-06-01 PROCEDURE — 86901 BLOOD TYPING SEROLOGIC RH(D): CPT

## 2020-06-01 PROCEDURE — 6360000002 HC RX W HCPCS: Performed by: INTERNAL MEDICINE

## 2020-06-01 PROCEDURE — 85027 COMPLETE CBC AUTOMATED: CPT

## 2020-06-01 PROCEDURE — 36430 TRANSFUSION BLD/BLD COMPNT: CPT

## 2020-06-01 PROCEDURE — 6370000000 HC RX 637 (ALT 250 FOR IP): Performed by: INTERNAL MEDICINE

## 2020-06-01 PROCEDURE — 80048 BASIC METABOLIC PNL TOTAL CA: CPT

## 2020-06-01 PROCEDURE — 36415 COLL VENOUS BLD VENIPUNCTURE: CPT

## 2020-06-01 PROCEDURE — 86850 RBC ANTIBODY SCREEN: CPT

## 2020-06-01 PROCEDURE — 86900 BLOOD TYPING SEROLOGIC ABO: CPT

## 2020-06-01 PROCEDURE — P9016 RBC LEUKOCYTES REDUCED: HCPCS

## 2020-06-01 PROCEDURE — 2580000003 HC RX 258: Performed by: INTERNAL MEDICINE

## 2020-06-01 PROCEDURE — 93306 TTE W/DOPPLER COMPLETE: CPT

## 2020-06-01 PROCEDURE — 86923 COMPATIBILITY TEST ELECTRIC: CPT

## 2020-06-01 PROCEDURE — 83735 ASSAY OF MAGNESIUM: CPT

## 2020-06-01 PROCEDURE — 84100 ASSAY OF PHOSPHORUS: CPT

## 2020-06-01 PROCEDURE — 1200000000 HC SEMI PRIVATE

## 2020-06-01 PROCEDURE — 94760 N-INVAS EAR/PLS OXIMETRY 1: CPT

## 2020-06-01 RX ORDER — SODIUM CHLORIDE 9 MG/ML
INJECTION, SOLUTION INTRAVENOUS
Status: DISPENSED
Start: 2020-06-01 | End: 2020-06-01

## 2020-06-01 RX ORDER — 0.9 % SODIUM CHLORIDE 0.9 %
20 INTRAVENOUS SOLUTION INTRAVENOUS ONCE
Status: COMPLETED | OUTPATIENT
Start: 2020-06-01 | End: 2020-06-01

## 2020-06-01 RX ADMIN — LAMOTRIGINE 75 MG: 25 TABLET ORAL at 23:27

## 2020-06-01 RX ADMIN — PANCRELIPASE 6000 UNITS: 30000; 6000; 19000 CAPSULE, DELAYED RELEASE PELLETS ORAL at 11:30

## 2020-06-01 RX ADMIN — ONDANSETRON 4 MG: 2 INJECTION INTRAMUSCULAR; INTRAVENOUS at 00:54

## 2020-06-01 RX ADMIN — Medication 10 ML: at 23:33

## 2020-06-01 RX ADMIN — POLYETHYLENE GLYCOL 3350 102 G: 17 POWDER, FOR SOLUTION ORAL at 23:27

## 2020-06-01 RX ADMIN — SODIUM CHLORIDE 20 ML: 9 INJECTION, SOLUTION INTRAVENOUS at 11:32

## 2020-06-01 RX ADMIN — CIPROFLOXACIN 400 MG: 2 INJECTION, SOLUTION INTRAVENOUS at 13:26

## 2020-06-01 RX ADMIN — SPIRONOLACTONE 50 MG: 25 TABLET ORAL at 11:31

## 2020-06-01 RX ADMIN — METHADONE HYDROCHLORIDE 10 MG: 10 TABLET ORAL at 23:27

## 2020-06-01 RX ADMIN — ENOXAPARIN SODIUM 40 MG: 40 INJECTION SUBCUTANEOUS at 11:31

## 2020-06-01 RX ADMIN — PANCRELIPASE 6000 UNITS: 30000; 6000; 19000 CAPSULE, DELAYED RELEASE PELLETS ORAL at 17:00

## 2020-06-01 RX ADMIN — METHADONE HYDROCHLORIDE 10 MG: 10 TABLET ORAL at 11:31

## 2020-06-01 RX ADMIN — LAMOTRIGINE 75 MG: 25 TABLET ORAL at 11:31

## 2020-06-01 ASSESSMENT — PAIN - FUNCTIONAL ASSESSMENT: PAIN_FUNCTIONAL_ASSESSMENT: ACTIVITIES ARE NOT PREVENTED

## 2020-06-01 ASSESSMENT — PAIN DESCRIPTION - ONSET: ONSET: ON-GOING

## 2020-06-01 ASSESSMENT — PAIN DESCRIPTION - ORIENTATION: ORIENTATION: MID;LEFT;RIGHT

## 2020-06-01 ASSESSMENT — PAIN SCALES - GENERAL
PAINLEVEL_OUTOF10: 5
PAINLEVEL_OUTOF10: 7
PAINLEVEL_OUTOF10: 0

## 2020-06-01 ASSESSMENT — PAIN DESCRIPTION - LOCATION: LOCATION: ABDOMEN

## 2020-06-01 ASSESSMENT — PAIN DESCRIPTION - FREQUENCY: FREQUENCY: CONTINUOUS

## 2020-06-01 ASSESSMENT — PAIN DESCRIPTION - PROGRESSION: CLINICAL_PROGRESSION: NOT CHANGED

## 2020-06-01 ASSESSMENT — PAIN DESCRIPTION - PAIN TYPE: TYPE: CHRONIC PAIN

## 2020-06-01 ASSESSMENT — PAIN DESCRIPTION - DESCRIPTORS: DESCRIPTORS: ACHING

## 2020-06-01 NOTE — PROGRESS NOTES
Clinical Pharmacy Note    Metronidazole placed on hold due to nausea when given with tube feeds. Order has been discontinued per protocol. Please reassess and reorder if appropriate.      Ange Hardy, PharmD, [id number]  Clinical Pharmacist  [id number]

## 2020-06-01 NOTE — PROGRESS NOTES
[facility] GI  Gastroenterology Progress Note  Mariia Long is a 29 y.o. female patient. 1. Colitis    2. Hypoalbuminemia    3. Lightheadedness    4. Chronic anemia        SUBJECTIVE:  Feels weak. Had nausea and numbness in hands last night. Nausea occurred when received antibiotics and when TFs were increased to 25 ml/hr. Pain at baseline. No diarrhea. Physical    VITALS:  BP (!) 94/54   Pulse 84   Temp 98.3 °F (36.8 °C) (Oral)   Resp 16   Ht 5' 6\" (1.676 m)   Wt 111 lb 9.6 oz (50.6 kg)   SpO2 95%   BMI 18.01 kg/m²   TEMPERATURE:  Current - Temp: 98.3 °F (36.8 °C); Max - Temp  Av.9 °F (36.6 °C)  Min: 97 °F (36.1 °C)  Max: 98.5 °F (36.9 °C)    Abdomen soft, ND, NT, no HSM, Bowel sounds normal     Data      Recent Labs     20  0640   WBC 11.1* 10.7   HGB 8.0* 7.0*   HCT 23.9* 20.4*   MCV 93.6 95.9    153     Recent Labs     20  0610 20  0640   *  --  137   K 3.6 3.2* 4.1   CL 96*  --  102   CO2 30  --  27   PHOS  --  2.4* 1.9*   BUN 7  --  8   CREATININE 0.7  --  1.0     Recent Labs     20   AST 34   ALT 15   BILITOT 2.8*   ALKPHOS 110     Recent Labs     20   LIPASE 6.0*       covid19 negative    ASSESSMENT :  Acute colitis - no diarrhea     Weakness/Severe Protein calorie malnutrition - She is losing weight and obviously failing to thrive at home despite IV therapy and J-tube feeds at home. Seen at ED three times week of admission and failed at home. Appreciate Dietician consult to reassess caloric needs in setting of recent wound infection. Starting Osmolite per rec's and watching for refeeding synd. Appreciate PT/OT assessment for strengthening and need for rehab placement.       Cirrhosis - likely due to DINH with evidence of portal HTN (ascites) on CT. She developed MICK while on higher doses of diuretics as outpatient. Today has less edema than yesterday.   Will continue Aldactone today and hold off on

## 2020-06-01 NOTE — RESULT ENCOUNTER NOTE
Please contact patient with their testing results: Your test for COVID-19, also known as novel coronavirus, came back negative. No virus was detected from the sample collected. Until your symptoms are fully resolved, you may still be contagious. We recommend that you remain isolated for 7 days minimum or 72 hours after your symptoms have completely resolved, whichever is longer. Continually monitor symptoms. Contact a medical provider if symptoms are worsening. If you have any additional questions, contact your PCP.     For additional information, please visit the Centers for Disease Control and Prevention   [email or web address]

## 2020-06-01 NOTE — PROGRESS NOTES
motion. No jugular venous distention. Trachea midline. Respiratory:  Normal respiratory effort. Clear to auscultation, bilaterally without Rales/Wheezes/Rhonchi. Cardiovascular: Regular rate and rhythm with normal S1/S2 without murmurs, rubs or gallops. Abdomen: Soft, upper abd mild to moderate tenderness,  non-distended with normal bowel sounds. Musculoskeletal: No clubbing, cyanosis or edema bilaterally. Full range of motion without deformity. Skin: Skin color, texture, turgor normal.  No rashes or lesions. Neurologic:  Neurovascularly intact without any focal sensory/motor deficits. Cranial nerves: II-XII intact, grossly non-focal.  Psychiatric: Alert and oriented, thought content appropriate, normal insight  Capillary Refill: Brisk,< 3 seconds   Peripheral Pulses: +2 palpable, equal bilaterally       Labs:   Recent Labs     05/29/20 1945 06/01/20  0640   WBC 11.1* 10.7   HGB 8.0* 7.0*   HCT 23.9* 20.4*    153     Recent Labs     05/29/20 1945 05/31/20  0610 06/01/20  0640   *  --  137   K 3.6 3.2* 4.1   CL 96*  --  102   CO2 30  --  27   BUN 7  --  8   CREATININE 0.7  --  1.0   CALCIUM 8.0*  --  8.5   PHOS  --  2.4* 1.9*     Recent Labs     05/29/20 1945   AST 34   ALT 15   BILITOT 2.8*   ALKPHOS 110     No results for input(s): INR in the last 72 hours. No results for input(s): Gareld Junk in the last 72 hours. Urinalysis:      Lab Results   Component Value Date    NITRU Negative 05/29/2020    WBCUA 19 05/27/2020    BACTERIA 1+ 05/27/2020    RBCUA 6 05/27/2020    BLOODU Negative 05/29/2020    SPECGRAV 1.011 05/29/2020    GLUCOSEU Negative 05/29/2020    GLUCOSEU 250 02/01/2011       Radiology:  CT ABDOMEN PELVIS W IV CONTRAST Additional Contrast? None   Final Result   1. Thickening of the ascending and transverse colon consistent with colitis. This was not seen in the previous evaluation. 2. Jejunostomy tube in adequate position. 3. Mild ascites.    4. Atelectatic changes

## 2020-06-01 NOTE — PROGRESS NOTES
Guidelines):  1. Energy Intake-Less than or equal to 75% of estimated energy requirement, Greater than or equal to 1 month    2. Weight Loss-10% loss or greater, (5-6 months)  3. Fat Loss-Unable to assess,    4. Muscle Loss-Unable to assess,    5. Fluid Accumulation-Mild fluid accumulation, Extremities  6.  Strength-Not measured    Nutrition Risk Level: High    Nutrition Needs:  · Estimated Daily Total Kcal: 1440 -1680  · Estimated Daily Protein (g): 58 - 869  · Estimated Daily Fluid (ml/day): 1 mL/kcal    Nutrition Diagnosis:   · Problem: Inadequate oral intake  · Etiology: related to Nausea     Signs and symptoms:  as evidenced by Nutrition support - EN(not at goal)    Objective Information:  · Nutrition-Focused Physical Findings: +1 pitting BLE edema; +BM 5/31; aldactone, glycolax, creon, insulin  · Wound Type: Surgical Wound  · Current Nutrition Therapies:  · Oral Diet Orders: Low Fat, Carb Control 4 Carbs/Meal   · Oral Diet intake: (variable; pt reports eating better on 5/31 compared to 5/30)  · Oral Nutrition Supplement (ONS) Orders: None  · Tube Feeding (TF) Orders:   · Feeding Route: Jejunostomy  · Formula: 1.5 Calorie without Fiber  · Duration: Continuous  · Goal TF & Flush Orders Provides: Osmolite 1.5 @ 45 mL/hr provides 1080 mL totalv volume, 1620 kcal, 68 g protein, 822 mL free water.   135 mL q4 hr to provide full water needs to meet 1 mL/1kcal  · Anthropometric Measures:  · Ht: 5' 6\" (167.6 cm)   · Current Body Wt: 111 lb (50.3 kg)(5/30)  · Admission Body Wt: 106 lb (48.1 kg)  · Weight Change: 5 - 6 months loss of 19 lb / 15% based on stated wt   · Ideal Body Wt: 130 lb (59 kg)   · BMI Classification: BMI <18.5 Underweight    Nutrition Interventions:   Continue current diet, Start Tube Feeding  Continued Inpatient Monitoring    Nutrition Evaluation:   · Evaluation: No progress toward goals   · Goals: porfirio to TF at goal   · Monitoring: Meal Intake, Diet Tolerance, TF Intake, TF Tolerance, I&O, Weight, Pertinent Labs, Nausea or Vomiting      Electronically signed by Amish Hernandez RD, LD on 6/1/20 at 11:26 AM EDT  Contact Number: [phone]

## 2020-06-01 NOTE — PROGRESS NOTES
Physical Therapy  Agustina Escobar  PT treatment attempted. Pt supine in bed upon arrival with mother present in opposite bed. Pt politely declining participation at this time, citing significant fatigue with plans for blood transfusion later this date. PT had confirmed blood transfusion with RN prior to attempt. Pt agreeable to attempting PT later this date following transfusion in hopes of improved tolerance to activity. PT informing pt will attempt later this date as able but unable to make guarantee. Pt verbalizing understanding; no other needs at this time. Thank you,  Lane Chaves, PT, DPT, [id number]    PT returning for 2nd attempt this date. Upon arrival, RN informing this writer that pt would soon be leaving floor to ECHO. PT will therefore attempt as schedule allows.   Thank you,  Lane Chaves, PT, DPT, [id number]

## 2020-06-02 LAB
ANION GAP SERPL CALCULATED.3IONS-SCNC: 9 MMOL/L (ref 3–16)
BLOOD BANK DISPENSE STATUS: NORMAL
BLOOD BANK PRODUCT CODE: NORMAL
BPU ID: NORMAL
BUN BLDV-MCNC: 10 MG/DL (ref 7–20)
CALCIUM SERPL-MCNC: 8.3 MG/DL (ref 8.3–10.6)
CHLORIDE BLD-SCNC: 101 MMOL/L (ref 99–110)
CO2: 28 MMOL/L (ref 21–32)
CREAT SERPL-MCNC: 1.3 MG/DL (ref 0.6–1.1)
DESCRIPTION BLOOD BANK: NORMAL
GFR AFRICAN AMERICAN: 56
GFR NON-AFRICAN AMERICAN: 47
GLUCOSE BLD-MCNC: 184 MG/DL (ref 70–99)
GLUCOSE BLD-MCNC: 206 MG/DL (ref 70–99)
GLUCOSE BLD-MCNC: 242 MG/DL (ref 70–99)
GLUCOSE BLD-MCNC: 256 MG/DL (ref 70–99)
GLUCOSE BLD-MCNC: 260 MG/DL (ref 70–99)
GLUCOSE BLD-MCNC: 281 MG/DL (ref 70–99)
HCT VFR BLD CALC: 21.7 % (ref 36–48)
HEMOGLOBIN: 7.4 G/DL (ref 12–16)
MAGNESIUM: 2.4 MG/DL (ref 1.8–2.4)
MCH RBC QN AUTO: 31.1 PG (ref 26–34)
MCHC RBC AUTO-ENTMCNC: 33.9 G/DL (ref 31–36)
MCV RBC AUTO: 91.8 FL (ref 80–100)
PDW BLD-RTO: 18.9 % (ref 12.4–15.4)
PERFORMED ON: ABNORMAL
PHOSPHORUS: 2.7 MG/DL (ref 2.5–4.9)
PLATELET # BLD: 142 K/UL (ref 135–450)
PMV BLD AUTO: 9.5 FL (ref 5–10.5)
POTASSIUM SERPL-SCNC: 4.1 MMOL/L (ref 3.5–5.1)
RBC # BLD: 2.37 M/UL (ref 4–5.2)
SODIUM BLD-SCNC: 138 MMOL/L (ref 136–145)
WBC # BLD: 12.1 K/UL (ref 4–11)

## 2020-06-02 PROCEDURE — 80048 BASIC METABOLIC PNL TOTAL CA: CPT

## 2020-06-02 PROCEDURE — 2580000003 HC RX 258: Performed by: INTERNAL MEDICINE

## 2020-06-02 PROCEDURE — 97110 THERAPEUTIC EXERCISES: CPT

## 2020-06-02 PROCEDURE — 97116 GAIT TRAINING THERAPY: CPT

## 2020-06-02 PROCEDURE — 6370000000 HC RX 637 (ALT 250 FOR IP): Performed by: INTERNAL MEDICINE

## 2020-06-02 PROCEDURE — 83735 ASSAY OF MAGNESIUM: CPT

## 2020-06-02 PROCEDURE — 36430 TRANSFUSION BLD/BLD COMPNT: CPT

## 2020-06-02 PROCEDURE — 6360000002 HC RX W HCPCS: Performed by: INTERNAL MEDICINE

## 2020-06-02 PROCEDURE — 97535 SELF CARE MNGMENT TRAINING: CPT

## 2020-06-02 PROCEDURE — 84100 ASSAY OF PHOSPHORUS: CPT

## 2020-06-02 PROCEDURE — 85027 COMPLETE CBC AUTOMATED: CPT

## 2020-06-02 PROCEDURE — 1200000000 HC SEMI PRIVATE

## 2020-06-02 RX ORDER — 0.9 % SODIUM CHLORIDE 0.9 %
20 INTRAVENOUS SOLUTION INTRAVENOUS ONCE
Status: COMPLETED | OUTPATIENT
Start: 2020-06-02 | End: 2020-06-02

## 2020-06-02 RX ORDER — SODIUM CHLORIDE 9 MG/ML
INJECTION, SOLUTION INTRAVENOUS
Status: DISPENSED
Start: 2020-06-02 | End: 2020-06-03

## 2020-06-02 RX ADMIN — Medication 10 ML: at 09:33

## 2020-06-02 RX ADMIN — LAMOTRIGINE 75 MG: 25 TABLET ORAL at 21:55

## 2020-06-02 RX ADMIN — INSULIN LISPRO 2 UNITS: 100 INJECTION, SOLUTION INTRAVENOUS; SUBCUTANEOUS at 11:58

## 2020-06-02 RX ADMIN — PANCRELIPASE 6000 UNITS: 30000; 6000; 19000 CAPSULE, DELAYED RELEASE PELLETS ORAL at 09:09

## 2020-06-02 RX ADMIN — SPIRONOLACTONE 50 MG: 25 TABLET ORAL at 09:09

## 2020-06-02 RX ADMIN — INSULIN LISPRO 2 UNITS: 100 INJECTION, SOLUTION INTRAVENOUS; SUBCUTANEOUS at 21:54

## 2020-06-02 RX ADMIN — Medication 10 ML: at 22:16

## 2020-06-02 RX ADMIN — ONDANSETRON 4 MG: 4 TABLET, ORALLY DISINTEGRATING ORAL at 09:09

## 2020-06-02 RX ADMIN — METHADONE HYDROCHLORIDE 10 MG: 10 TABLET ORAL at 21:53

## 2020-06-02 RX ADMIN — METHADONE HYDROCHLORIDE 10 MG: 10 TABLET ORAL at 09:00

## 2020-06-02 RX ADMIN — INSULIN LISPRO 3 UNITS: 100 INJECTION, SOLUTION INTRAVENOUS; SUBCUTANEOUS at 09:09

## 2020-06-02 RX ADMIN — LAMOTRIGINE 75 MG: 25 TABLET ORAL at 09:09

## 2020-06-02 RX ADMIN — PANCRELIPASE 6000 UNITS: 30000; 6000; 19000 CAPSULE, DELAYED RELEASE PELLETS ORAL at 17:21

## 2020-06-02 RX ADMIN — SODIUM CHLORIDE 20 ML: 9 INJECTION, SOLUTION INTRAVENOUS at 15:10

## 2020-06-02 RX ADMIN — ENOXAPARIN SODIUM 40 MG: 40 INJECTION SUBCUTANEOUS at 09:09

## 2020-06-02 RX ADMIN — PROMETHAZINE HYDROCHLORIDE 12.5 MG: 25 TABLET ORAL at 11:58

## 2020-06-02 ASSESSMENT — PAIN SCALES - GENERAL
PAINLEVEL_OUTOF10: 5
PAINLEVEL_OUTOF10: 4
PAINLEVEL_OUTOF10: 4
PAINLEVEL_OUTOF10: 5

## 2020-06-02 ASSESSMENT — PAIN DESCRIPTION - LOCATION: LOCATION: ABDOMEN

## 2020-06-02 ASSESSMENT — PAIN DESCRIPTION - PROGRESSION
CLINICAL_PROGRESSION: NOT CHANGED
CLINICAL_PROGRESSION: NOT CHANGED

## 2020-06-02 ASSESSMENT — PAIN DESCRIPTION - PAIN TYPE: TYPE: CHRONIC PAIN

## 2020-06-02 NOTE — PROGRESS NOTES
Occupational Therapy  Facility/Department: [facility] 3A NURSING  Daily Treatment Note  NAME: Nikita Candelario  : 1985  MRN: [id number]    Date of Service: 2020    Discharge Recommendations: Nikita Candelario scored a 20/24 on the AM-PAC ADL Inpatient form. Current research shows that an AM-PAC score of 18 or greater is typically associated with a discharge to the patient's home setting. Based on the patient's AM-PAC score, and their current ADL deficits, it is recommended that the patient have 2-3 sessions per week of Occupational Therapy at d/c to increase the patient's independence. At this time, this patient demonstrates the endurance and safety to discharge home with home services and a follow up treatment frequency of 2-3x/wk. Please see assessment section for further patient specific details. HOME HEALTH CARE: LEVEL 1 STANDARD    - Initial home health evaluation to occur within 24-48 hours, in patient home   - Therapy to evaluate with goal of regaining prior level of functioning   - Therapy to evaluate if patient has [address] needs for personal care  If patient discharges prior to next session this note will serve as a discharge summary. Please see below for the latest assessment towards goals. OT Equipment Recommendations  Equipment Needed: No  Other: Pt reports she has shower chair and required equipment at home. Assessment   Performance deficits / Impairments: Decreased functional mobility ; Decreased ADL status; Decreased endurance;Decreased strength;Decreased high-level IADLs  Assessment: Pt is limited in the above areas impacting independence in ADLs and functional mobility. Pt would benefit from skilled inpatient OT services to address these deficits.   Treatment Diagnosis: Decreased ADL and functional mobility status due to colitis and chronic health issues  Prognosis: Good  OT Education: OT Role;Plan of Care;Home Exercise Program  Patient Education: rian d/c- pt and pt's to have her direct admitted to the hospital but due to [facility] restrictions she had to come through the emergency department. Patient has been getting albumin transfusions outpatient. She received 50 g of albumin already this afternoon. Patient states she feels so lightheaded and dizzy that she is not able to walk and has to scoot around on the floor. She denies any new abdominal pain. Patient has had multiple abdominal surgeries and has a jejunostomy tube. Patient states she is able to tolerate food and liquids by mouth but is also receiving tube feeds. Patient states occasionally the tube feeds make her feel nauseated however she has not vomited today. Patient was recently treated with antibiotics for bilateral lower leg cellulitis however this has resolved. Patient does still have swelling to bilateral lower extremities however has decreased from where it was at a few weeks ago. She denies any cough or shortness of breath or fever at home. Patient has a significant surgical history including appendectomy, pancreatectomy, small bowel obstructions and most recently surgery to remove adhesions and correct hernia mesh issue  Subjective   General  Chart Reviewed: Yes  Patient assessed for rehabilitation services?: Yes  Additional Pertinent Hx: Briefly, this is an 29 y. o.female who presented with concern for weakness and fatigue. She has multiple chronic health problems, FTT, malnutrition, multiple abdominal surgeries including recent abdominal mesh procedure. Receiving albumen infusions as an outpatient. Continues with weakness. Sent to the ED for evaluation and admission by her GI doctor, Dr Lawyer Gerard. Findings of colitis on imaging. LFT abnormalities appear close to her baseline, anemia is slightly worsened. Family / Caregiver Present: Yes(mother, Lainey)  Diagnosis: colitis  Subjective  Subjective: Pt seated in chair upon OT arrival; pt working. Mother present; agreeable to OT tx.  Pt reports chronic 5/10 abdominal pain. Pt tearful at times during session re: her current weakness. Encouragement and support provided. Pain Assessment  Pain Assessment: 0-10  Pain Level: 5  Pain Type: Chronic pain  Pain Location: Abdomen  Pre Treatment Pain Screening  Intervention List: Patient able to continue with treatment; RN notified  Vital Signs  Patient Currently in Pain: Yes   Orientation  Orientation  Overall Orientation Status: Within Normal Limits  Objective    ADL  Feeding: Independent(per pt report; eating by mouth & tube fed)  Grooming: Independent(brush teeth, wash hands in stance at sink)  LE Dressing: Supervision; Increased time to complete(clothing mgt, s/u socks)  Toileting: Supervision  Additional Comments: Pt donned socks and ambulated to sink to brush teeth and to toilet. After toileting, pt washed hands at sink and ambulated to bed. Pt educated re UE ther ex and HEP provided. Balance  Sitting Balance: Supervision  Standing Balance: Stand by assistance(SBA dynamic, Supervision static)  Standing Balance  Time: 5 min, ~2 min  Activity: functional mobility to sink for grooming, to toilet; clothing mgt, wash hands, mobility to EOB  Functional Mobility  Functional - Mobility Device: No device(holding IV pole to bathroom, no support from bathroom.)  Activity: To/from bathroom  Assist Level: Stand by assistance  Functional Mobility Comments: No LOB  Toilet Transfers  Toilet - Technique: Ambulating  Equipment Used: Standard toilet(verbal cues for grab bar)  Toilet Transfer: Supervision  Bed mobility  Sit to Supine: Modified independent  Transfers  Stand Step Transfers: Stand by assistance  Sit to stand: Stand by assistance  Stand to sit: Stand by assistance                       Cognition  Overall Cognitive Status: WFL     Perception  Overall Perceptual Status: WFL              Type of ROM/Therapeutic Exercise  Type of ROM/Therapeutic Exercise: AROM  Comment: Pt educated re: UE ther ex and HEP provided.  Pt and

## 2020-06-02 NOTE — PROGRESS NOTES
Physical Therapy  Facility/Department: [address] NURSING  Daily Treatment Note  NAME: Damaris Fan  : 1985  MRN: [id number]    Date of Service: 2020    Discharge Recommendations: Damaris Fan scored a 18/24 on the AM-PAC short mobility form. Current research shows that an AM-PAC score of 18 or greater is typically associated with a discharge to the patient's home setting. Based on the patient's AM-PAC score and their current functional mobility deficits, it is recommended that the patient have 2-3 sessions per week of Physical Therapy at d/c to increase the patient's independence. At this time, this patient demonstrates the endurance and safety to discharge home with home services and a follow up treatment frequency of 2-3x/wk. Please see assessment section for further patient specific details. If patient discharges prior to next session this note will serve as a discharge summary. Please see below for the latest assessment towards goals. HOME HEALTH CARE: LEVEL 1 STANDARD    - Initial home health evaluation to occur within 24-48 hours, in patient home   - Therapy to evaluate with goal of regaining prior level of functioning   - Therapy to evaluate if patient has [address] needs for personal care     PT Equipment Recommendations  Equipment Needed: No    Assessment   Body structures, Functions, Activity limitations: Decreased functional mobility ; Decreased strength;Decreased endurance  Assessment: Patient was functionally independent prior to admission, presently weaker and requiring assistance with transfers and ambulation. Will follow during acute stay. Currently recommend home health PT at d/c. Recommendations to be updated as case evolves. Treatment Diagnosis: dec'd functional mobility  Prognosis: Good  Decision Making: Low Complexity  History: As above. Exam: functional mobility assessment  Clinical Presentation: Stable.   PT Education: Goals;PT Role;Plan of Care;Home Exercise

## 2020-06-03 LAB
ALBUMIN SERPL-MCNC: 3.3 G/DL (ref 3.4–5)
ALP BLD-CCNC: 74 U/L (ref 40–129)
ALT SERPL-CCNC: 11 U/L (ref 10–40)
ANION GAP SERPL CALCULATED.3IONS-SCNC: 6 MMOL/L (ref 3–16)
AST SERPL-CCNC: 20 U/L (ref 15–37)
BILIRUB SERPL-MCNC: 2.6 MG/DL (ref 0–1)
BILIRUBIN DIRECT: 0.6 MG/DL (ref 0–0.3)
BILIRUBIN, INDIRECT: 2 MG/DL (ref 0–1)
BUN BLDV-MCNC: 14 MG/DL (ref 7–20)
CALCIUM SERPL-MCNC: 8.2 MG/DL (ref 8.3–10.6)
CHLORIDE BLD-SCNC: 103 MMOL/L (ref 99–110)
CO2: 30 MMOL/L (ref 21–32)
CREAT SERPL-MCNC: 1.1 MG/DL (ref 0.6–1.1)
GFR AFRICAN AMERICAN: >60
GFR NON-AFRICAN AMERICAN: 57
GLUCOSE BLD-MCNC: 206 MG/DL (ref 70–99)
GLUCOSE BLD-MCNC: 253 MG/DL (ref 70–99)
GLUCOSE BLD-MCNC: 280 MG/DL (ref 70–99)
GLUCOSE BLD-MCNC: 283 MG/DL (ref 70–99)
GLUCOSE BLD-MCNC: 303 MG/DL (ref 70–99)
HCT VFR BLD CALC: 24.9 % (ref 36–48)
HEMOGLOBIN: 8.4 G/DL (ref 12–16)
MAGNESIUM: 2.6 MG/DL (ref 1.8–2.4)
MCH RBC QN AUTO: 30.7 PG (ref 26–34)
MCHC RBC AUTO-ENTMCNC: 33.9 G/DL (ref 31–36)
MCV RBC AUTO: 90.4 FL (ref 80–100)
PDW BLD-RTO: 17.9 % (ref 12.4–15.4)
PERFORMED ON: ABNORMAL
PHOSPHORUS: 2.7 MG/DL (ref 2.5–4.9)
PLATELET # BLD: 140 K/UL (ref 135–450)
PMV BLD AUTO: 9.2 FL (ref 5–10.5)
POTASSIUM SERPL-SCNC: 4.4 MMOL/L (ref 3.5–5.1)
RBC # BLD: 2.75 M/UL (ref 4–5.2)
SODIUM BLD-SCNC: 139 MMOL/L (ref 136–145)
TOTAL PROTEIN: 5.5 G/DL (ref 6.4–8.2)
WBC # BLD: 11.3 K/UL (ref 4–11)

## 2020-06-03 PROCEDURE — 84100 ASSAY OF PHOSPHORUS: CPT

## 2020-06-03 PROCEDURE — 6360000002 HC RX W HCPCS: Performed by: INTERNAL MEDICINE

## 2020-06-03 PROCEDURE — 80076 HEPATIC FUNCTION PANEL: CPT

## 2020-06-03 PROCEDURE — 1200000000 HC SEMI PRIVATE

## 2020-06-03 PROCEDURE — 83735 ASSAY OF MAGNESIUM: CPT

## 2020-06-03 PROCEDURE — 6370000000 HC RX 637 (ALT 250 FOR IP): Performed by: INTERNAL MEDICINE

## 2020-06-03 PROCEDURE — 2580000003 HC RX 258: Performed by: INTERNAL MEDICINE

## 2020-06-03 PROCEDURE — 97110 THERAPEUTIC EXERCISES: CPT

## 2020-06-03 PROCEDURE — 97530 THERAPEUTIC ACTIVITIES: CPT

## 2020-06-03 PROCEDURE — 85027 COMPLETE CBC AUTOMATED: CPT

## 2020-06-03 PROCEDURE — 80048 BASIC METABOLIC PNL TOTAL CA: CPT

## 2020-06-03 RX ORDER — FUROSEMIDE 40 MG/1
40 TABLET ORAL DAILY
Status: DISCONTINUED | OUTPATIENT
Start: 2020-06-03 | End: 2020-06-04 | Stop reason: HOSPADM

## 2020-06-03 RX ADMIN — FUROSEMIDE 40 MG: 40 TABLET ORAL at 14:01

## 2020-06-03 RX ADMIN — PANCRELIPASE 6000 UNITS: 30000; 6000; 19000 CAPSULE, DELAYED RELEASE PELLETS ORAL at 12:26

## 2020-06-03 RX ADMIN — METHADONE HYDROCHLORIDE 10 MG: 10 TABLET ORAL at 22:24

## 2020-06-03 RX ADMIN — INSULIN LISPRO 3 UNITS: 100 INJECTION, SOLUTION INTRAVENOUS; SUBCUTANEOUS at 08:42

## 2020-06-03 RX ADMIN — ENOXAPARIN SODIUM 40 MG: 40 INJECTION SUBCUTANEOUS at 08:41

## 2020-06-03 RX ADMIN — PROMETHAZINE HYDROCHLORIDE 12.5 MG: 25 TABLET ORAL at 23:44

## 2020-06-03 RX ADMIN — Medication 10 ML: at 08:46

## 2020-06-03 RX ADMIN — METHADONE HYDROCHLORIDE 10 MG: 10 TABLET ORAL at 08:42

## 2020-06-03 RX ADMIN — PANCRELIPASE 6000 UNITS: 30000; 6000; 19000 CAPSULE, DELAYED RELEASE PELLETS ORAL at 17:24

## 2020-06-03 RX ADMIN — LAMOTRIGINE 75 MG: 25 TABLET ORAL at 22:24

## 2020-06-03 RX ADMIN — INSULIN LISPRO 2 UNITS: 100 INJECTION, SOLUTION INTRAVENOUS; SUBCUTANEOUS at 12:25

## 2020-06-03 RX ADMIN — Medication 10 ML: at 22:24

## 2020-06-03 RX ADMIN — INSULIN LISPRO 2 UNITS: 100 INJECTION, SOLUTION INTRAVENOUS; SUBCUTANEOUS at 22:24

## 2020-06-03 RX ADMIN — PROMETHAZINE HYDROCHLORIDE 12.5 MG: 25 TABLET ORAL at 17:28

## 2020-06-03 RX ADMIN — PANCRELIPASE 6000 UNITS: 30000; 6000; 19000 CAPSULE, DELAYED RELEASE PELLETS ORAL at 08:42

## 2020-06-03 RX ADMIN — LAMOTRIGINE 75 MG: 25 TABLET ORAL at 08:42

## 2020-06-03 RX ADMIN — INSULIN LISPRO 4 UNITS: 100 INJECTION, SOLUTION INTRAVENOUS; SUBCUTANEOUS at 17:24

## 2020-06-03 ASSESSMENT — PAIN SCALES - GENERAL
PAINLEVEL_OUTOF10: 5
PAINLEVEL_OUTOF10: 0
PAINLEVEL_OUTOF10: 5
PAINLEVEL_OUTOF10: 5
PAINLEVEL_OUTOF10: 0
PAINLEVEL_OUTOF10: 5

## 2020-06-03 ASSESSMENT — PAIN DESCRIPTION - PROGRESSION
CLINICAL_PROGRESSION: NOT CHANGED

## 2020-06-03 ASSESSMENT — PAIN DESCRIPTION - LOCATION
LOCATION: ABDOMEN

## 2020-06-03 ASSESSMENT — PAIN DESCRIPTION - PAIN TYPE
TYPE: CHRONIC PAIN

## 2020-06-03 NOTE — PROGRESS NOTES
pt's mother verbalized understanding. Pt demonstrated understanding of HEP. Barriers to Learning: none  REQUIRES OT FOLLOW UP: Yes  Activity Tolerance  Activity Tolerance: Patient Tolerated treatment well  Activity Tolerance: Pt reported 4-5/10 (\"Somewhat Heavy to Heavy\") on RPE (Rate of Perceived Exertion) Scale after 12-min standing activity and seated UE ther ex. She reported that this activity would be 3-4 at home. Safety Devices  Safety Devices in place: Yes  Type of devices: All fall risk precautions in place;Call light within reach;Nurse notified(Mother in room; pt seated on EOB)  Restraints  Initially in place: No         Patient Diagnosis(es): The primary encounter diagnosis was Colitis. Diagnoses of Hypoalbuminemia, Lightheadedness, and Chronic anemia were also pertinent to this visit. has a past medical history of Abdominal pain, Amenorrhea, Carbapenem-resistant bacterial infection, Chronic pancreatitis ([address] Diabetes mellitus ([address] Digestive disorder, Gastroparesis, Hepatic steatosis, History of blood transfusion, MARIANA (iron deficiency anemia), Kidney stone, Kidney stone, PICC (peripherally inserted central catheter) in place, and Seizure ([address] has a past surgical history that includes Cholecystectomy; Pancreas surgery (8/06); Splenectomy; Stomach surgery (12/06); hernia repair; Small intestine surgery; Upper gastrointestinal endoscopy (11/30/2010); Cystoscopy (10/31/2017); Cystoscopy (11/09/2017); colectomy; Tunneled venous port placement (Left, 03/09/2018); Colonoscopy (N/A, 7/25/2019); Upper gastrointestinal endoscopy (N/A, 7/25/2019); Enteroscopy (N/A, 11/6/2019); Colonoscopy (N/A, 11/6/2019); and sigmoidoscopy (N/A, 4/29/2020). Restrictions  Restrictions/Precautions  Restrictions/Precautions: Up as Tolerated, Fall Risk(Medium falls risk)  Required Braces or Orthoses?: No  Position Activity Restriction  Other position/activity restrictions:  Simin Garibay is a 29 y.o. female patient X 10 reps  Shoulder ABduction: X 10 reps  Shoulder ADduction: X 10 reps to 90 degrees, d/t discomfort w/port  Horizontal ABduction: X 10 reps  Elbow Flexion: X 20 reps  Elbow Extension: X 20 reps  Supination: X 20 reps  Pronation: X 20 reps  Wrist Flexion: X 20 reps  Wrist Extension: X 20 reps  Finger Flexion: X 20 reps  Finger Extension: X 20 reps                    Plan   Plan  Times per week: 3-5  Times per day: Daily  Specific instructions for Next Treatment: ADLs, standing tolerance  Current Treatment Recommendations: Strengthening, Endurance Training, Patient/Caregiver Education & Training, Self-Care / ADL, Home Management Training, Functional Mobility Training    AM-PAC Score        AM-PAC Inpatient Daily Activity Raw Score: 21 (06/03/20 1451)  AM-PAC Inpatient ADL T-Scale Score : 44.27 (06/03/20 1451)  ADL Inpatient CMS 0-100% Score: 32.79 (06/03/20 1451)  ADL Inpatient CMS G-Code Modifier : Kirstie Quintana (06/03/20 1451)    Goals  Short term goals  Time Frame for Short term goals: d/c  Short term goal 1: Pt will complete functional mobility mod I--Supervision without AD 6/3  Short term goal 2: Pt will complete at least 5 minute functional ADL task in stance mod I--5 min, 2 min 6/2--GOAL MET  Short term goal 3: Pt will complete LB ADLs mod I--Supervision don/doff socks, supervision clothing mgt 6/2--Not addressed 6/3; pt reported independent  Short term goal 4: REVISED STG 2: Pt will tolerate7-10 min standing for ADL activity/functional mobility--12 min 6/3--GOAL MET  Short term goal 5: REVISED STG 4: Pt will tolerate standing 15 min for ADL activity/functional task/mobility  Long term goals  Time Frame for Long term goals : .  Patient Goals   Patient goals : get better and go home       Therapy Time   Individual Concurrent Group Co-treatment   Time In 2504         Time Out 1439         Minutes 40               Timed Code Treatment Minutes:  40    Total Treatment Minutes:  [address] [phone],

## 2020-06-03 NOTE — PLAN OF CARE
Nutrition Problem: Inadequate oral intake  Intervention: Food and/or Nutrient Delivery: Continue current diet, Modify current Tube Feeding  Nutritional Goals: porfirio to TF at goal

## 2020-06-03 NOTE — PLAN OF CARE
Problem: Pain:  Goal: Pain level will decrease  Description: Pain level will decrease  6/2/2020 2311 by Clyde Bentley RN  Outcome: Ongoing    Problem: Pain:  Goal: Control of acute pain  Description: Control of acute pain  6/2/2020 1043 by Rigo Ricks RN  Outcome: Ongoing     Problem: Falls - Risk of:  Goal: Will remain free from falls  Description: Will remain free from falls  6/2/2020 2311 by Clyde Bentley RN  Outcome: Ongoing    Problem: Nutrition  Goal: Optimal nutrition therapy  6/2/2020 2311 by Clyde Bentley RN  Outcome: Ongoing     Problem: Falls - Risk of:  Goal: Absence of physical injury  Description: Absence of physical injury  6/2/2020 2311 by Clyde Bentley RN  Outcome: Ongoing

## 2020-06-03 NOTE — PROGRESS NOTES
[facility] change from diabetic tube feed and increase in daily carbs from approximately 100 grams to now 220 gram.  Discussed with pharmacist Kenzie, who recommends adding basal insulin to her SSI regimen.   Kenzie to discuss with MD.

## 2020-06-03 NOTE — PROGRESS NOTES
jugular venous distention. Trachea midline. Respiratory:  Normal respiratory effort. Clear to auscultation, bilaterally without Rales/Wheezes/Rhonchi. Cardiovascular: Regular rate and rhythm with normal S1/S2 without murmurs, rubs or gallops. Abdomen: Soft, upper abd mild to moderate tenderness,  non-distended with normal bowel sounds. Musculoskeletal: No clubbing, cyanosis or edema bilaterally. Full range of motion without deformity. Skin: Skin color, texture, turgor normal.  No rashes or lesions. Neurologic:  Neurovascularly intact without any focal sensory/motor deficits. Cranial nerves: II-XII intact, grossly non-focal.  Psychiatric: Alert and oriented, thought content appropriate, normal insight  Capillary Refill: Brisk,< 3 seconds   Peripheral Pulses: +2 palpable, equal bilaterally       Labs:   Recent Labs     06/01/20 0640 06/02/20  0533 06/03/20  0600   WBC 10.7 12.1* 11.3*   HGB 7.0* 7.4* 8.4*   HCT 20.4* 21.7* 24.9*    142 140     Recent Labs     06/01/20  0640 06/02/20  0533 06/03/20  0600    138 139   K 4.1 4.1 4.4    101 103   CO2 27 28 30   BUN 8 10 14   CREATININE 1.0 1.3* 1.1   CALCIUM 8.5 8.3 8.2*   PHOS 1.9* 2.7 2.7     Recent Labs     06/03/20  0600   AST 20   ALT 11   BILIDIR 0.6*   BILITOT 2.6*   ALKPHOS 74     No results for input(s): INR in the last 72 hours. No results for input(s): [place] in the last 72 hours. Urinalysis:      Lab Results   Component Value Date    NITRU Negative 05/29/2020    WBCUA 19 05/27/2020    BACTERIA 1+ 05/27/2020    RBCUA 6 05/27/2020    BLOODU Negative 05/29/2020    SPECGRAV 1.011 05/29/2020    GLUCOSEU Negative 05/29/2020    GLUCOSEU 250 02/01/2011       Radiology:  CT ABDOMEN PELVIS W IV CONTRAST Additional Contrast? None   Final Result   1. Thickening of the ascending and transverse colon consistent with colitis. This was not seen in the previous evaluation. 2. Jejunostomy tube in adequate position.    3. Mild

## 2020-06-03 NOTE — PROGRESS NOTES
Pertinent Labs, Nausea or Vomiting      Electronically signed by Vladimir Adams RD, LD on 6/3/20 at 12:07 PM EDT  Contact Number: [phone]

## 2020-06-03 NOTE — PLAN OF CARE
Problem: Pain:  Goal: Pain level will decrease  Description: Pain level will decrease  6/3/2020 0917 by Ellouise Galeazzi, RN  Outcome: Ongoing  Note: Pain /discomfort being managed with PRN analgesics per MD orders. Patient able to express presence and absence of pain and rate pain appropriately using numerical scale. Problem: Falls - Risk of:  Goal: Will remain free from falls  Description: Will remain free from falls  6/3/2020 0917 by Ellouise Galeazzi, RN  Outcome: Ongoing  Note: Pt is wearing the fall bracelet, S.A.F.E. sign is posted outside door, and yellow blanket is on the bed. Pt informed of fall risks, verbalizes understanding, and agrees to ask for help to ambulate.

## 2020-06-04 VITALS
WEIGHT: 125 LBS | DIASTOLIC BLOOD PRESSURE: 58 MMHG | BODY MASS INDEX: 20.09 KG/M2 | TEMPERATURE: 98.6 F | RESPIRATION RATE: 16 BRPM | HEART RATE: 87 BPM | SYSTOLIC BLOOD PRESSURE: 99 MMHG | HEIGHT: 66 IN | OXYGEN SATURATION: 96 %

## 2020-06-04 LAB
ANION GAP SERPL CALCULATED.3IONS-SCNC: 4 MMOL/L (ref 3–16)
BLOOD CULTURE, ROUTINE: NORMAL
BUN BLDV-MCNC: 16 MG/DL (ref 7–20)
CALCIUM SERPL-MCNC: 8.4 MG/DL (ref 8.3–10.6)
CHLORIDE BLD-SCNC: 101 MMOL/L (ref 99–110)
CO2: 30 MMOL/L (ref 21–32)
CREAT SERPL-MCNC: 1.2 MG/DL (ref 0.6–1.1)
CULTURE, BLOOD 2: NORMAL
GFR AFRICAN AMERICAN: >60
GFR NON-AFRICAN AMERICAN: 51
GLUCOSE BLD-MCNC: 277 MG/DL (ref 70–99)
GLUCOSE BLD-MCNC: 332 MG/DL (ref 70–99)
GLUCOSE BLD-MCNC: 354 MG/DL (ref 70–99)
HCT VFR BLD CALC: 26 % (ref 36–48)
HEMOGLOBIN: 8.8 G/DL (ref 12–16)
MAGNESIUM: 2.8 MG/DL (ref 1.8–2.4)
MCH RBC QN AUTO: 30.9 PG (ref 26–34)
MCHC RBC AUTO-ENTMCNC: 33.8 G/DL (ref 31–36)
MCV RBC AUTO: 91.4 FL (ref 80–100)
PDW BLD-RTO: 17.4 % (ref 12.4–15.4)
PERFORMED ON: ABNORMAL
PERFORMED ON: ABNORMAL
PHOSPHORUS: 3.3 MG/DL (ref 2.5–4.9)
PLATELET # BLD: 137 K/UL (ref 135–450)
PMV BLD AUTO: 9.6 FL (ref 5–10.5)
POTASSIUM SERPL-SCNC: 4.7 MMOL/L (ref 3.5–5.1)
RBC # BLD: 2.85 M/UL (ref 4–5.2)
SODIUM BLD-SCNC: 135 MMOL/L (ref 136–145)
WBC # BLD: 10.9 K/UL (ref 4–11)

## 2020-06-04 PROCEDURE — 97110 THERAPEUTIC EXERCISES: CPT

## 2020-06-04 PROCEDURE — 6370000000 HC RX 637 (ALT 250 FOR IP): Performed by: INTERNAL MEDICINE

## 2020-06-04 PROCEDURE — 84100 ASSAY OF PHOSPHORUS: CPT

## 2020-06-04 PROCEDURE — 80048 BASIC METABOLIC PNL TOTAL CA: CPT

## 2020-06-04 PROCEDURE — 83735 ASSAY OF MAGNESIUM: CPT

## 2020-06-04 PROCEDURE — 97116 GAIT TRAINING THERAPY: CPT

## 2020-06-04 PROCEDURE — 85027 COMPLETE CBC AUTOMATED: CPT

## 2020-06-04 RX ADMIN — FUROSEMIDE 40 MG: 40 TABLET ORAL at 10:06

## 2020-06-04 RX ADMIN — LAMOTRIGINE 75 MG: 25 TABLET ORAL at 10:06

## 2020-06-04 RX ADMIN — INSULIN LISPRO 5 UNITS: 100 INJECTION, SOLUTION INTRAVENOUS; SUBCUTANEOUS at 10:07

## 2020-06-04 RX ADMIN — METHADONE HYDROCHLORIDE 10 MG: 10 TABLET ORAL at 10:06

## 2020-06-04 ASSESSMENT — PAIN SCALES - GENERAL
PAINLEVEL_OUTOF10: 5
PAINLEVEL_OUTOF10: 5
PAINLEVEL_OUTOF10: 4

## 2020-06-04 ASSESSMENT — PAIN DESCRIPTION - PROGRESSION: CLINICAL_PROGRESSION: NOT CHANGED

## 2020-06-04 NOTE — PROGRESS NOTES
Data- discharge order received, pt verbalized agreement to discharge, needs for [facility] for RN, PT and OT services, SELWYN reviewed and signed by MD, to be completed by RN. Action- AVS prepared, discharge instructions prepared and given to patient and mother, medication information packet given r/t NEW or CHANGED prescriptions, pt verbalized understanding further self-review. D/C instruction summary: Diet- low fiber and carb control, Activity- as tolerated, follow up with Primary Care Physician [facility] [phone] appointment within 1 week, immunizations reviewed and updated, medications prescriptions to be filled n/a. Inpatient surgical procedural reviewed: n/a. Contact information provided to above agencies used. Response- Case Management/ reported faxing completed SELWYN and AVS to needed HHC/DME services stated above. Pt belongings gathered, IV removed, pt dressed. Disposition is home with HHC/DME as stated above, transported with mother, taken to lobby via w/c with discharge lounge, no complications.

## 2020-06-04 NOTE — CARE COORDINATION
Patient discharged 6/4/2020 to home with parent. She will resume home care with [address]  PHONE; [phone]  FAX: [phone] and tube feeds with [facility]. Orders and SELWYN faxed to agencies. All discharge needs met per case management.     MANUEL ManzoN, CCM, RN  [facility]  [phone]

## 2020-06-04 NOTE — PROGRESS NOTES
bilateral lower leg cellulitis however this has resolved. Patient does still have swelling to bilateral lower extremities however has decreased from where it was at a few weeks ago. She denies any cough or shortness of breath or fever at home. Patient has a significant surgical history including appendectomy, pancreatectomy, small bowel obstructions and most recently surgery to remove adhesions and correct hernia mesh issue  Subjective   General  Chart Reviewed: Yes  Additional Pertinent Hx: pancreatitis s/p removal, DM, DINH, She has a past surgical history that includes Cholecystectomy; Pancreas surgery (8/06); Splenectomy; Stomach surgery (12/06); hernia repair; Small intestine surgery; Upper gastrointestinal endoscopy (11/30/2010); Cystoscopy (10/31/2017); Cystoscopy (11/09/2017); colectomy; Tunneled venous port placement (Left, 03/09/2018); Colonoscopy (N/A, 7/25/2019); Upper gastrointestinal endoscopy (N/A, 7/25/2019); Enteroscopy (N/A, 11/6/2019); Colonoscopy (N/A, 11/6/2019); and sigmoidoscopy (N/A, 4/29/2020). Response To Previous Treatment: Patient with no complaints from previous session. Family / Caregiver Present: Yes(Mom)  Subjective  Subjective: Agreeable to PT treatment  General Comment  Comments: Patient supine. Denies c/o          Orientation  Orientation  Overall Orientation Status: Within Normal Limits  Cognition      Objective   Bed mobility  Supine to Sit: Modified independent  Sit to Supine: Modified independent  Scooting: Modified independent  Transfers  Sit to Stand: Modified independent  Stand to sit: Modified independent  Ambulation  Ambulation?: Yes  Ambulation 1  Surface: level tile  Device: No Device(PT pushed IV pole)  Assistance: Stand by assistance  Gait Deviations: Slow Yaritza; Increased SAUL; Decreased step length;Shuffles  Distance: ~ 250'  Stairs/Curb  Stairs?: No     Balance  Posture: Good  Sitting - Static: Good  Sitting - Dynamic: Good  Standing - Static: Good  Standing -

## 2020-06-04 NOTE — PROGRESS NOTES
[facility] GI  Gastroenterology Progress Note    Keri Martinez is a 29 y.o. female patient. 1. Colitis    2. Hypoalbuminemia    3. Lightheadedness    4. Chronic anemia        SUBJECTIVE:  Feels ok. No worse nausea and now TF at goal rate. Starting to have some LE and UE edema. Physical    VITALS:  BP (!) 92/54   Pulse 77   Temp 98.6 °F (37 °C) (Oral)   Resp 16   Ht 5' 6\" (1.676 m)   Wt 125 lb (56.7 kg)   SpO2 93%   BMI 20.18 kg/m²   TEMPERATURE:  Current - Temp: 98.6 °F (37 °C); Max - Temp  Av.3 °F (36.8 °C)  Min: 98.1 °F (36.7 °C)  Max: 98.6 °F (37 °C)    Abdomen soft, ND, NT, no HSM, Bowel sounds normal     Data      Recent Labs     20  0533 20  0600 20  0600   WBC 12.1* 11.3* 10.9   HGB 7.4* 8.4* 8.8*   HCT 21.7* 24.9* 26.0*   MCV 91.8 90.4 91.4    140 137     Recent Labs     20  0533 20  0600 20  0600    139 135*   K 4.1 4.4 4.7    103 101   CO2 28 30 30   PHOS 2.7 2.7 3.3   BUN 10 14 16   CREATININE 1.3* 1.1 1.2*     Recent Labs     20  0600   AST 20   ALT 11   BILIDIR 0.6*   BILITOT 2.6*   ALKPHOS 74     No results for input(s): LIPASE, AMYLASE in the last 72 hours. ASSESSMENT :  Acute colitis - no diarrhea     Weakness/Severe Protein calorie malnutrition - She is losing weight and obviously failing to thrive at home despite IV therapy and J-tube feeds at home. Seen at ED three times week of admission and failed at home. Appreciate Dietician consult to reassess caloric needs in setting of recent wound infection.      Appreciate PT/OT assessment for strengthening and need for rehab placement.    ECHO ok.     Cirrhosis - likely due to DINH with evidence of portal HTN (ascites) on CT.  She developed MICK while on higher doses of diuretics as outpatient.   Diuretics restarted yesterday and Creat 1.2 today -- ok and will follow.      Anemia - Hb up to 8.8 s/p total 2 U PRBC.  She has been iron deficient, but intolerant (high fevers, hemolysis, etc) to most IV iron preparations. No gross GI bleeding. Endoscopic eval multiple times (-) for bleeding source, so assumed poor iron absorption due to GI anatomy (no duodenum).  Consider Heme eval for appropriate iron infusion with Solumedrol/Benadry premeds.     Chronic abd pain s/p Total pancreatectomy for pancreatitis - has been maintained on low dose Methadone.  Has chronic constipation and GI dysmotility due to both.  Continue Methadone and high dose Miralax for now.        PLAN   :  - Continue tube feeds at goal rate and patient to run continuously as outpatient for now. - Continue Miralax. - Continue rest of home meds. - Lasix 40 mg daily as outpatient - will check BMP next week.   - OK to discharge from GI standpoint with home TF and home OT/PT.     Marin Olvera MD  [address]  6/4/2020

## 2020-06-08 ENCOUNTER — FOLLOWUP TELEPHONE ENCOUNTER (OUTPATIENT)
Dept: NUTRITION | Age: 35
End: 2020-06-08

## 2020-06-08 ENCOUNTER — HOSPITAL ENCOUNTER (OUTPATIENT)
Age: 35
Discharge: HOME | End: 2020-06-08
Attending: INTERNAL MEDICINE | Admitting: INTERNAL MEDICINE
Payer: COMMERCIAL

## 2020-06-08 LAB
ALBUMIN SERPL-MCNC: 2.9 G/DL (ref 3.4–5)
ANION GAP SERPL CALCULATED.3IONS-SCNC: 10 MMOL/L (ref 3–16)
BUN BLDV-MCNC: 21 MG/DL (ref 7–20)
CALCIUM SERPL-MCNC: 8 MG/DL (ref 8.3–10.6)
CHLORIDE BLD-SCNC: 96 MMOL/L (ref 99–110)
CO2: 28 MMOL/L (ref 21–32)
CREAT SERPL-MCNC: 1.2 MG/DL (ref 0.6–1.1)
GFR AFRICAN AMERICAN: >60
GFR NON-AFRICAN AMERICAN: 51
GLUCOSE BLD-MCNC: 304 MG/DL (ref 70–99)
MAGNESIUM: 2.8 MG/DL (ref 1.8–2.4)
PHOSPHORUS: 5.1 MG/DL (ref 2.5–4.9)
POTASSIUM SERPL-SCNC: 5.5 MMOL/L (ref 3.5–5.1)
SODIUM BLD-SCNC: 134 MMOL/L (ref 136–145)

## 2020-06-08 PROCEDURE — 80069 RENAL FUNCTION PANEL: CPT

## 2020-06-08 PROCEDURE — 83735 ASSAY OF MAGNESIUM: CPT

## 2020-06-08 PROCEDURE — 36415 COLL VENOUS BLD VENIPUNCTURE: CPT

## 2020-06-08 NOTE — PROGRESS NOTES
free water. Additional 504 mL water needed meets 1 mL/1kcal.  Can be divided and given every 3 - 4 hours, up to every 6 hrs.         Electronically signed by Emma Amin RD, LD on 6/8/2020 at 12:37 PM

## 2020-08-14 ENCOUNTER — HOSPITAL ENCOUNTER (OUTPATIENT)
Dept: PHYSICAL THERAPY | Age: 35
Setting detail: THERAPIES SERIES
Discharge: HOME | End: 2020-08-14
Payer: COMMERCIAL

## 2020-08-14 ENCOUNTER — HOSPITAL ENCOUNTER (OUTPATIENT)
Dept: OCCUPATIONAL THERAPY | Age: 35
Setting detail: THERAPIES SERIES
Discharge: HOME | End: 2020-08-14
Payer: COMMERCIAL

## 2020-08-14 PROCEDURE — 97140 MANUAL THERAPY 1/> REGIONS: CPT

## 2020-08-14 PROCEDURE — 97165 OT EVAL LOW COMPLEX 30 MIN: CPT

## 2020-08-14 PROCEDURE — 97530 THERAPEUTIC ACTIVITIES: CPT

## 2020-08-14 PROCEDURE — 97110 THERAPEUTIC EXERCISES: CPT

## 2020-08-14 PROCEDURE — 97161 PT EVAL LOW COMPLEX 20 MIN: CPT

## 2020-08-14 NOTE — PLAN OF CARE
Occupational Therapy Certification    Dear Dr. Elian Lara,    We had the pleasure of evaluating the following patient for occupational therapy services at [facility]. A summary of our findings can be found in the initial assessment below. This includes our plan of care. If you have any questions or concerns regarding these findings, please do not hesitate to contact me at the office phone number checked above. Thank you for the referral.       Referring Practitioner Signature:_______________________________Date:__________________  By signing above (or electronic signature), therapists plan is approved by the referring practicioner        Patient: Rach Herbert   : 1985   MRN: [id number]    [address], [facility], [address]  Phone: [phone]   Fax: [phone]    Referring Practicioner:  Dr. Elian Lara     Evaluation Date: 2020      Medical Diagnosis Information:  Dx- physical deconditioning                                Insurance information: [facility], [facility]    Precautions/ Contra-indications: lifting restriction of 8#, frail, J peg tubing   Latex Allergy:  [x]NO      []YES  Preferred Language for Healthcare:   [x]English       []other:    [address] [place] [address]  Reason for Seeking OT Services and Patient Goals: To gain strength and endurance needed to return to PLOF with ADL/IADL tasks, living alone, returning to work, and driving. Personal Interests and Values:   Pt enjoys her job as a  and spending time with family; pt states she spends a lot of time at work and does not participate in many leisure activities or hobbies.      Occupational History (Life Experiences Including Prior Level of Function):    PLOF: independent in ADLs, lived alone in a 1 story house and responsible for all home management tasks, worked as a , and was very independent. Current level of function: Pt is independent in ADLs but requires significantly increase time to complete d/t decrease strength and endurance and increase fatigue with all functional tasks. Pt is currently using a shower chair to bathe and would like to return to standing while showering. Pt has a feeding tube for 20 hours a day but is able to eat what she wants however pt states she has had no appetite. Pt has her own home but is living with her parents. Pt is not working at this time and her main focus and motivation is on recovering from 6 week hospital stay and regaining strength, activity tolerance, and balance needed to complete everyday tasks. Performance Patterns (Routines, Roles, Rituals, & Habits):  PLOF: working daily as a  and maintaining home independently. Current level of function: Pt reports a typical day for her stating that she gets up, goes to get coffee, goes back home and tries to sit up as long as she can but usually has to lay down d/t fatigue and weakness and complications from gastrointestinal complications specifically nausea. Physical and Social Environments (which aide or inhibit performance in desired occupations):  Pt is currently living with her parents in tri level home and parents are completing all home management tasks, pt is completing medication management independently. Completing stairs at parents house is difficult for pt and 1-2 hands on grab bar necessary to successfully complete. Pt has a shower chair and walk-in shower. Pt is no longer driving d/t fatigue. Pt's home- lives alone, walk in shower, grab bar in the shower,  Laundry in basement, one level home.      Personal, Temporal, and Virtual Contexts (which aide or inhibit performance in desired occupations):  Recovery is pt's main goal and pt does not plan to return to work until independence in ADL/IADL tasks, activity tolerance, strength, and balance increases. SUBJECTIVE: Patient stated complaint: decreased strength, activity tolerance, ADL/IADL status, and balance needed to be independent and complete everyday tasks safely. Pain Scale: 3/10 abdominal  Easing factors: none  Provocative factors: eating and random movements    Type: [x]Constant   []Intermittent  []Radiating []Localized []other:       OBJECTIVE:   Hand dominance: R    Inspection: R scapular winging, upper thoracic spine L rotation. Forward head and rounded shoulders noted.     Palpation:     Bandages/Dressings/Incisions: J tube     ROM WFL: [x]Yes []No (if checked, see below)     PROM AROM    L R L R   Shoulder Flexion        Shoulder Abduction        Shoulder External Rotation        Shoulder Internal Rotation        Elbow Flexion        Elbow Extension        Pronation        Supination        Wrist Flexion        Wrist Extension        Radial Deviation        Ulnar Deviation       CMC Abduction       5th Digit MCP Extension-Flexion       4th Digit MCP Extension-Flexion       3rd Digit MCP Extension-Flexion       2nd Digit MCP Extension-Flexio n       1st Digit MCP Extension-Flexion       5th Digit PIP Extension- Flexion       4th Digit PIP Extension-Flexion       3rd Digit PIP Extension-Flexion       2nd Digit PIP Extension-Flexion       1st Digit IP Extension-Flexion       5th Digit DIP Extension-Flexion       4th Digit DIP Extension-Flexion       3rd Digit DIP Extension-Flexion       2nd Digit DIP Extension-Flexion       Composite Flexion (Tip of 3rd Digit to Distal Palmar Crease (cm))         Joint mobility:    []Normal    [x]Hypo   []Hyper    Strength WFL: []Yes [x]No (if checked, see below)    Strength (0-5) Left Right    Shoulder Flex 3+ 3+   Shoulder Extension 3 3   Shoulder IR     Biceps 3+ 3+   Triceps 3 3   Pronation      Supination      Wrist Flex     Wrist Ext     Wrist Radial Deviation      Pt reports decreased strength in neck making it hard to hold head up unsupported at times. Orthopaedic Special Tests Positive  Negative  NT Comments    Shoulder        Empty Can              Elbow        Cozen's       Tinel's               Hand/Wrist       Finkelstein's       Tinel's       Phalen's       Froment's       [place] Sign       [place] Deformity       Trosper Neck Deformity       Heberden's Nodes (DIP)       Kaci's Nodes (PIP)       Mallet Finger       Grind Test [place])                      Hand Assessment Left  Right  Comments    9 Hole Peg Test (s)       Strength (lbs) 21 26    Lateral Pinch Strength (lbs) 6 8    3 Jaw Nicholas Pinch Strength (lbs) 6 9    Tip Pinch Strength (lbs) 5 7    Opposition (Y/N) Y Y    Functional Outcome Measures:         QuickDASH Total Score: 30          Functional Mobility/Transfers: completing with no device safely, however, fatigues easily and carries portable J tube pump and needs assistance carrying purse. Posture: forward head and rounded shoulders. Synergy/Muscle tone:     Sensation: : WFL    Coordination:  WFL    Visual Acuity: wears glasses/ contacts at all times    Perception: WFL    MVPT:      Trail making A:     Trail making B:     Visual field cut:    Cognition: WFL; flat affect. [x] Patient history, allergies, meds reviewed. Medical chart reviewed. See intake form. Review Of Systems (ROS):  [x]Performed Review of systems (Integumentary, CardioPulmonary, Neurological) by intake and observation. Intake form has been scanned into medical record. Patient has been instructed to contact their primary care physician regarding ROS issues if not already being addressed at this time.       Co-morbidities/Complexities (which will affect course of rehabilitation):   []None           Arthritic conditions   []Rheumatoid arthritis (M05.9)  []Osteoarthritis (M19.91)   Cardiovascular conditions   []Hypertension (I10)  []Hyperlipidemia (E78.5)  []Angina pectoris (I20)  []Atherosclerosis (I70)   Musculoskeletal conditions   []Disc pathology   []Congenital spine pathologies   []Prior surgical intervention  []Osteoporosis (M81.8)  []Osteopenia (M85.8)   Endocrine conditions   []Hypothyroid (E03.9)  []Hyperthyroid Gastrointestinal conditions   []Constipation (W33.57)     Metabolic conditions   []Morbid obesity (E66.01)  [x]Diabetes type 1(E10.65) or 2 (E11.65)   []Neuropathy (G60.9)     Pulmonary conditions   []Asthma (J45)  []Coughing   []COPD (J44.9)   Psychological Disorders  []Anxiety (F41.9)  []Depression (F32.9)   []Other:   [x]Other: multiple abdominal surgeries     . Barriers to/and or personal factors that will affect rehab potential:              [x]Age  []Sex    []Smoker              []Motivation/Lack of Motivation                        [x]Co-Morbidities              []Cognitive Function, education/learning barriers              [x]Environmental, home barriers              []profession/work barriers  []past PT/medical experience  [x]other: frequent nausea from gastrointestinal  complications, increase difficulty completing stairs in current home setup, low activity tolerance d/t comorbidities but pt is young and motivated. Justification: Pt is a good outpatient rehabilitation candidate      Falls Risk Assessment (30 days):   [x] Falls risk assessed and no intervention required. [] Falls risk assessed (via clinical reasoning) and patient requires intervention due to being higher risk for falls  [] Falls education provided, including       ASSESSMENT: The pt has chief complaints of decreased strength, postural control, activity tolerance, and decrease independence completing ADL/ IADL tasks. These symptoms as well as client factor and performance skill deficits create barriers to the patient engaging in desired occupational pursuits.   Therefore, the patient is in need of skilled occupational therapy services to mitigate functional deficits in order to allow

## 2020-08-14 NOTE — PROGRESS NOTES
[facility] - Outpatient Occupational Therapy      Hand Therapy Daily Treatment Note  Date:  2020    Patient: Pranay Bloom   : 1985   MRN: [id number]  Referring Physician:  Dr. Ramiro Fernández Diagnosis Information:  Dx- physical deconditioning                                         Insurance information:  [facility]; ($15 copay, 20 v per year no estim)  Date of Injury:  Date of Surgery:       Visit # Insurance Allowable Date Range         Date of Patient follow up with Physician: ?    Progress Note: []  Yes  [x]  No  Next due by: Visit #10      Latex Allergy:  [x]No      []Yes    Pacemaker:  [x] No       [] Yes     Preferred Language for Healthcare:   [x]English       []other:    Pain level:  3/10 constant abdominal pain    SUBJECTIVE:  See eval    Functional Disability Index: Quick DASH: raw score = ; dysfunction = 43%    OBJECTIVE: See eval      RESTRICTIONS/PRECAUTIONS: lifting restriction of 8#, frail, J peg tubing      Exercises/Interventions:   Session initiated with assessment of pt status using subjective and objective measures noted in evaluation, followed by collaborative discussion of goals. Pt educated on HEP and shown visual example of each exercise, pt verbalized and demo understanding but will provide visual written description in future session.      Therapeutic Exercises  Resistance / level Sets/sec Reps Notes                                                                                Neuromuscular Re-ed / Therapeutic Activities                                                 Manual Intervention                                                     Patient education:  EVAL, POC, HEP, Role of OT, Goals- pt verbalized understanding, pt would benefit from visual aid of HEP in future session      Home Exercise Program:  Pt educated to complete the following bodyweight exercises to increase strength and endurance:  1) shoulder flexion  2) shoulder extension  3) elbow flexion  4) elbow extension  5) shoulder abduction  6) shoulder adduction      Therapeutic Exercise and NMR:  [x] (26234) Provided verbal/tactile cueing for activities related to strengthening, flexibility, endurance, ROM  for improvements in scapular, scapulothoracic and UE control with self care, reaching, carrying, lifting, house/yardwork, driving/computer work.    [] (65359) Provided verbal/tactile cueing for activities related to improving balance, coordination, kinesthetic sense, posture, motor skill, proprioception  to assist with  scapular, scapulothoracic and UE control with self care, reaching, carrying, lifting, house/yardwork, driving/computer work.   [] Comments:    Therapeutic Activities:    [x] (52934 or 61498) Provided verbal/tactile cueing for activities related to improving balance, coordination, kinesthetic sense, posture, motor skill, proprioception and motor activation to allow for proper function of scapular, scapulothoracic and UE control with self care, carrying, lifting, driving/computer work  [] Comments:    Home Exercise Program:    [x] (89636) Reviewed/Progressed HEP activities related to strengthening, flexibility, endurance, ROM of scapular, scapulothoracic and UE control with self care, reaching, carrying, lifting, house/yardwork, driving/computer work  [x] (67388) Reviewed/Progressed HEP activities related to improving balance, coordination, kinesthetic sense, posture, motor skill, proprioception of scapular, scapulothoracic and UE control with self care, reaching, carrying, lifting, house/yardwork, driving/computer work    [] Comments:    Manual Treatments:  PROM / STM / Oscillations-Mobs:  G-I, II, III, IV (PA's, Inf., Post.)  [] (03835) Provided manual therapy to mobilize soft tissue/joints of cervical/CT, scapular GHJ and UE for the purpose of modulating pain, promoting relaxation,  increasing ROM, reducing/eliminating soft tissue

## 2020-08-14 NOTE — FLOWSHEET NOTE
[facility]  Phone: [phone]   Fax: [phone]    Physical Therapy Daily Treatment Note  Date:  2020    Patient Name:  Pat Benítez    :  1985  MRN: [id number]  Medical/Treatment Diagnosis Information:  · Diagnosis: physical deconditioning  Treatment Diagnosis: decreased B UE and LE stretch, poor endurance, impaired posture, increased muscle tension through R cervicothoracic region, spinal malalignment  Insurance/Certification information:  PT Insurance Information: [facility] ($15 copay, 20 v per yearno estim)  Physician Information:  Referring Practitioner: Dr. Montell Carrel of care signed (Y/N): []  Yes [x]  No     Date of Patient follow up with Physician: ?     Progress Report: []  Yes  [x]  No     Date Range for reporting period:  Beginnin2020  Ending:     Progress report due (10 Rx/or 30 days whichever is less): visit #80      Recertification due (POC duration/ or 90 days whichever is less): 2020     Visit # Insurance Allowable Auth required?  Date Range    20 []  Yes  [x]  No n/a     Latex Allergy:  [x]NO      []YES  Preferred Language for Healthcare:   [x]English       []other:    Functional Scale:        Date assessed:  30 sec STS: raw score 5 reps                                           2020    Pain level:  3/10 abdominal     SUBJECTIVE:  See eval    OBJECTIVE: See eval      RESTRICTIONS/PRECAUTIONS: lifting restriction of 8#, frail, J peg tubing     Exercises/Interventions:     Therapeutic Exercises (41957) Resistance / level Sets/sec Reps Notes   LAQ  Seated   1 x10 B  x10 B    sarhamnn level 0.5  1 x20    Supine SLR  1 x10 B                                              Therapeutic Activities (14017)       posture       Head position                            Neuromuscular Re-ed (01960)                                                 Manual Intervention (36376)       cervical stretching into L SBing and L rotation, rotational mobs grade 1 to correct L upper t spine rotations, gentle cervical traction in neutral, DTM to L cervical paraspinals   x10 min                                           Pt. Education:  8/14: patient educated on diagnosis, prognosis and expectations for rehab, all patient questions were answered; stressed importance of taking breaks when needed and completing HEP each day in short bouts; encouraged pt to find head neutral every time she passes a mirror to avoid holding head towards the right     Home Exercise Program:  Access Code: [email or web address]   URL: [email or web address]   Date: 08/14/2020   Prepared by: Brandi Valiente     Exercises   Seated Long Arc Quad - 10 reps - 3 sets - 1x daily - 7x weekly   Seated March - 10 reps - 3 sets - 1x daily - 7x weekly   Supine Active Straight Leg Raise - 10 reps - 3 sets - 1x daily - 7x weekly         Therapeutic Exercise and NMR EXR  [x] (39072) Provided verbal/tactile cueing for activities related to strengthening, flexibility, endurance, ROM for improvements in  [x] LE / Lumbar: LE, proximal hip, and core control with self care, mobility, lifting, ambulation. [] UE / Cervical: cervical, postural, scapular, scapulothoracic and UE control with self care, reaching, carrying, lifting, house/yardwork, driving, computer work.  [] (92163) Provided verbal/tactile cueing for activities related to improving balance, coordination, kinesthetic sense, posture, motor skill, proprioception to assist with   [] LE / lumbar: LE, proximal hip, and core control in self care, mobility, lifting, ambulation and eccentric single leg control.    [] UE / cervical: cervical, scapular, scapulothoracic and UE control with self care, reaching, carrying, lifting, house/yardwork, driving, computer work.   [] (12469) Therapist is in constant attendance of 2 or more patients providing skilled therapy interventions, but not providing any significant amount of measurable improving balance, coordination, kinesthetic sense, posture, motor skill, proprioception of   [] LE: core, proximal hip and LE for self care, mobility, lifting, and ambulation/stair navigation    [] UE / Cervical: cervical, postural,  scapular, scapulothoracic and UE control with self care, reaching, carrying, lifting, house/yardwork, driving, computer work    Manual Treatments:  PROM / STM / Oscillations-Mobs:  G-I, II, III, IV (PA's, Inf., Post.)  [x] (52171) Provided manual therapy to mobilize LE, proximal hip and/or LS spine soft tissue/joints for the purpose of modulating pain, promoting relaxation,  increasing ROM, reducing/eliminating soft tissue swelling/inflammation/restriction, improving soft tissue extensibility and allowing for proper ROM for normal function with   [] LE / lumbar: self care, mobility, lifting and ambulation. [x] UE / Cervical: self care, reaching, carrying, lifting, house/yardwork, driving, computer work. Modalities:  [] (20006) Vasopneumatic compression: Utilized vasopneumatic compression to decrease edema / swelling for the purpose of improving mobility and quad tone / recruitment which will allow for increased overall function including but not limited to self-care, transfers, ambulation, and ascending / descending stairs. Modalities:      Charges:  Timed Code Treatment Minutes: 40   Total Treatment Minutes: 55     [x] EVAL - LOW (16536) x 1  [] EVAL - MOD (71473)  [] EVAL - HIGH (17495)  [] RE-EVAL (35004)  [x] NI(53006) x 2       [] Ionto  [] NMR (36413) x       [] Vaso  [x] Manual (71491) x 1      [] Ultrasound  [] TA x        [] Mech Traction (83825)  [] Aquatic Therapy x     [] ES (un) (63278):   [] Home Management Training x  [] ES(attended) (90594)   [] Dry Needling 1-2 muscles (98263):  [] Dry Needling 3+ muscles (524868)  [] Group:      [] Other:     GOALS:  Patient stated goal: gain strength and endurance     Therapist goals for Patient:   Short Term Goals:  To Prognosis: [] Good [x] Fair  [] Poor    Patient Requires Follow-up: [x] Yes  [] No    Plan for next treatment session:  Overall strength - UEs, LEs, abdominals as able, endurance training, posture training     PLAN: See eval. PT 2x / week for 6 weeks. [] Continue per plan of care [] Alter current plan (see comments)  [x] Plan of care initiated [] Hold pending MD visit [] Discharge    Electronically signed by: Garret Alexandre PT, DPT    Note: If patient does not return for scheduled/ recommended follow up visits, this note will serve as a discharge from care along with most recent update on progress.

## 2020-08-14 NOTE — PLAN OF CARE
[facility]  [address] [place] [address] [address], [address]  Phone: [phone]   Fax:     [phone]                                                       Physical Therapy Certification    Dear Referring Practitioner: Dr. Cloyde Kussmaul,    We had the pleasure of evaluating the following patient for physical therapy services at [address] [place] [address]. A summary of our findings can be found in the initial assessment below. This includes our plan of care. If you have any questions or concerns regarding these findings, please do not hesitate to contact me at the office phone number checked above. Thank you for the referral.       Physician Signature:_______________________________Date:__________________  By signing above (or electronic signature), therapists plan is approved by physician      Patient: Burak Pepe   : 1985   MRN: [id number]  Referring Physician: Referring Practitioner: Dr. Cloyde Kussmaul Fax: [phone]    Evaluation Date: 2020      Medical Diagnosis Information:  Diagnosis: physical deconditioning   Treatment Diagnosis: decreased B UE and LE stretch, poor endurance, impaired posture, increased muscle tension through R cervicothoracic region, spinal malalignment                                         Insurance information: PT Insurance Information: [facility] ($15 copay, 20 v per year, no estim)    Precautions/ Contra-indications: frail, no to lift more than 8#  Latex Allergy:  [x]NO      []YES  Preferred Language for Healthcare:   [x]English       []other:    SUBJECTIVE: Patient stated complaint: was refered to therapy after hospital stay. Due to malnourishment she is very weak. Has decreased endurance. Wants to get back to independent life. 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 First surgery was pancreas removal. Since then she has had malabsorption issues.   Last hospital bout was about 6 weeks. Pt feels exhausted all of the time. Pt works as a  but is off right now. Doesn't have hobbies due to work and health balance. Does enjoy going to her brother's basketball/baseball games. Does lay down a lot during the day. Can go about 8-10 hours if she isn't up and moving, but if she is up walking around and talking to people she can go for about 1 hour. Feels she has no strength whatsoever. Feels really tight all over due to lack of activity. Has tension in her head and neck muscles are sore. Hasn't lived at her own house for the last 6 months - moved into her parent's house. Relevant Medical History: multiple abdominal surgeries - gall bladder, pancreatectomy; DM since pancreas removed, seizures, PICC line, J tube, G tube    Functional Outcome: 30 sec STS = 5 reps    Pain Scale: 3/10 abdominal pain all the time   Easing factors: none  Provocative factors: eating and random movements     Type: [x]Constant   []Intermittent  []Radiating []Localized []other:     Numbness/Tingling: none    Occupation/School:  - off right now     Living Status/Prior Level of Function: Prior to this injury / incident, pt was independent with ADLs and IADLs, parents are doing cooking and cleaning but pt does bathe and dress herself    OBJECTIVE:     Palpation: no TTP    Functional Mobility/Transfers: independent, but slow and cautious     Inspection: prominent C7, upper thor spine L rotation, R scapular winging     Posture: forward head and rounded shoulders     Bandages/Dressings/Incisions: n/s    Gait: (include devices/WB status):  WNL     Dermatomes Normal Abnormal Comments   Top of head (C1)      Posterior occipital region (C2)      Side of neck (C3)      Top of shoulder (C4) x     Lateral deltoid (C5) x     Tip of thumb (C6) x     Distal middle finger (C7) x     Distal fifth finger (C8) x     Medial forearm (T1) x     inguinal area (L1)       anterior mid-thigh (L2) x     distal ant thigh/med knee (L3) x     medial lower leg and foot (L4) x     lateral lower leg and foot (L5) x     posterior calf (S1) x     medial calcaneus (S2) x              PROM AROM    L R L R   GHJ flexion   WNL WNL   GHJ abd   WNL WNL   GHJ IR   WNL WNL   GHJ ER   WNL WNL                                                 Joint mobility:    []Normal    [x]Hypo cervical    []Hyper    Strength (0-5) Left Right   Shoulder flexion 4- 4-   Shoulder abd 4- 4   Shoulder ER 4- 4   Shoulder IR 4 4   Seated hip flexion 3+ 3+   Seated hip ER 3+ 4-   Seated hip IR 4- 3+   Knee flexion 4- 4-   Knee ext 4- 4-   Ankle DF 4+ 4+                          Orthopaedic Special Tests  Positive  Negative  NT Comments    30 sec STS     5 reps   sahrmann level 0.5    Able to complete    sahrmann level 1a    Unable to keep back on table                                                                     [x] Patient history, allergies, meds reviewed. Medical chart reviewed. See intake form. Review Of Systems (ROS):  [x]Performed Review of systems (Integumentary, CardioPulmonary, Neurological) by intake and observation. Intake form has been scanned into medical record. Patient has been instructed to contact their primary care physician regarding ROS issues if not already being addressed at this time.       Co-morbidities/Complexities (which will affect course of rehabilitation):   []None           Arthritic conditions   []Rheumatoid arthritis (M05.9)  []Osteoarthritis (M19.91)   Cardiovascular conditions   []Hypertension (I10)  []Hyperlipidemia (E78.5)  []Angina pectoris (I20)  []Atherosclerosis (I70)   Musculoskeletal conditions   []Disc pathology   []Congenital spine pathologies   []Prior surgical intervention  []Osteoporosis (M81.8)  []Osteopenia (M85.8)   Endocrine conditions   []Hypothyroid (E03.9)  []Hyperthyroid Gastrointestinal conditions   []Constipation (Q32.72)   Metabolic conditions   []Morbid obesity (E66.01)  [x]Diabetes type 1(E10.65) or 2 (E11.65)   []Neuropathy (G60.9)     Pulmonary conditions   []Asthma (J45)  []Coughing   []COPD (J44.9)   Psychological Disorders  []Anxiety (F41.9)  []Depression (F32.9)   []Other:   [x]Other:     Multiple abdominal surgeries     Barriers to/and or personal factors that will affect rehab potential:              [x]Age  []Sex   []Smoker              []Motivation/Lack of Motivation                        [x]Co-Morbidities              []Cognitive Function, education/learning barriers              [x]Environmental, home barriers              []profession/work barriers  []past PT/medical experience  []other:  Justification: pt has low tolerance to activity due to various comorbidities, but is young and motivated     Falls Risk Assessment (30 days):   [x] Falls Risk assessed and no intervention required.   [] Falls Risk assessed and Patient requires intervention due to being higher risk   TUG score (>12s at risk):     [] Falls education provided, including         ASSESSMENT:    Functional Impairments:     [x]Noted cervical and thoracic joint hypomobility   []Noted  joint hypermobility   []Decreased functional ROM   []Abnormal reflexes/sensation/myotomal/dermatomal deficits   []Decreased strength and neuromuscular control    [x]Decreased functional strength   []other:      Functional Activity Limitations (from functional questionnaire and intake)   []Reduced ability to tolerate prolonged functional positions   [x]Reduced ability or difficulty with changes of positions or transfers between positions   [x]Reduced ability to maintain good posture and demonstrate good body mechanics with sitting, bending, and lifting   [x] Reduced ability or tolerance with driving and/or computer work   [x]Reduced ability to perform lifting, reaching, carrying tasks   []Reduced ability to concentrate   []Reduced ability to sleep    [x]Reduced ability to tolerate any impact through B UEs   []Reduced ability to ambulate prolonged functional periods/distances   []other:    Participation Restrictions   []Reduced participation in self care activities   [x]Reduced participation in home management activities   [x]Reduced participation in work activities   [x]Reduced participation in social activities. []Reduced participation in sport/recreational activities. Classification/Subgrouping:   [x]signs/symptoms consistent with deconditioning secondary to nutritional issues and recent surgeries     Prognosis/Rehab Potential:      []Excellent   []Good    [x]Fair   []Poor    Tolerance of evaluation/treatment:    []Excellent   []Good    [x]Fair   []Poor    Physical Therapy Evaluation Complexity Justification  [x] A history of present problem with:  [] no personal factors and/or comorbidities that impact the plan of care;  []1-2 personal factors and/or comorbidities that impact the plan of care  [x]3 personal factors and/or comorbidities that impact the plan of care  [x] An examination of body systems using standardized tests and measures addressing any of the following: body structures and functions (impairments), activity limitations, and/or participation restrictions;:  [] a total of 1-2 or more elements   [x] a total of 3 or more elements   [] a total of 4 or more elements   [x] A clinical presentation with:  [x] stable and/or uncomplicated characteristics   [] evolving clinical presentation with changing characteristics  [] unstable and unpredictable characteristics;   [x] Clinical decision making of [x] low, [] moderate, [] high complexity using standardized patient assessment instrument and/or measurable assessment of functional outcome.     [x] EVAL (LOW) 72106 (typically 20 minutes face-to-face)  [] EVAL (MOD) 16136 (typically 30 minutes face-to-face)  [] EVAL (HIGH) 47129 (typically 45 minutes face-to-face)  [] RE-EVAL     PLAN:   Frequency/Duration:  2 days per week for 6 Weeks:  Interventions:  [x]  Therapeutic exercise including: strength training, ROM, including postural re-education. [x]  NMR activation and proprioception, including postural re-education. [x]  Manual therapy as indicated to include: Dry Needling/IASTM, STM, PROM, Gr I-IV mobilizations, manipulation. [x] Modalities as needed that may include: thermal agents, E-stim, Biofeedback, US, iontophoresis as indicated  [x] Patient education on joint protection, postural re-education, activity modification, progression of HEP. HEP instruction: Written HEP instructions provided and reviewed    GOALS:  Patient stated goal: gain strength and endurance     Therapist goals for Patient:   Short Term Goals: To be achieved in: 2 weeks  1. Independent in HEP and progression per patient tolerance, in order to prevent re-injury. 2. Patient will have a decrease in pain to facilitate improvement in movement, function, and ADLs as indicated by Functional Deficits. Long Term Goals: To be achieved in: 6 weeks  1. Pt will increase 30 sec STS reps to at least 8 to demo improvement in endurance. 2. Patient will increase strength in B UEs to at least 4+/5 and B LEs to at least 4/5 so patient can stand, walk, and complete ADLs independently. 3. Patient will demonstrate an increase in postural awareness and control to allow for proper functional mobility as indicated by patients Functional Deficits. 4. Patient will be able to amb for 300 feet or greater without sitting rest break to progress towards PLOF. 5. Patient will return to functional activities including cooking without increased symptoms or restriction.         Electronically signed by:  Anatoliy Eckert, PT, DPT

## 2020-08-17 ENCOUNTER — HOSPITAL ENCOUNTER (OUTPATIENT)
Dept: PHYSICAL THERAPY | Age: 35
Setting detail: THERAPIES SERIES
Discharge: HOME | End: 2020-08-17
Payer: COMMERCIAL

## 2020-08-17 ENCOUNTER — HOSPITAL ENCOUNTER (OUTPATIENT)
Dept: OCCUPATIONAL THERAPY | Age: 35
Setting detail: THERAPIES SERIES
Discharge: HOME | End: 2020-08-17
Payer: COMMERCIAL

## 2020-08-17 NOTE — PROGRESS NOTES
Occupational Therapy      Occupational Therapy  Cancellation/No-show Note  Patient Name:  Maki Dc   :  1985   Date:  2020  Cancelled visits to date: 1  No-shows to date: 0    Patient status for today's appointment patient:  [x]  Cancelled  []  Rescheduled appointment  []  No-show     Reason given by patient:  []  Patient ill  []  Conflicting appointment  []  No transportation    []  Conflict with work  []  No reason given  []  Other:     Comments:      Phone call information:   []  Phone call made today to patient at _ time at number provided:      []  Patient answered, conversation as follows:    []  Patient did not answer, message left as follows:  [x]  Phone call not made today    Electronically signed by:  Merary Scott OT

## 2020-08-19 ENCOUNTER — HOSPITAL ENCOUNTER (OUTPATIENT)
Dept: OCCUPATIONAL THERAPY | Age: 35
Setting detail: THERAPIES SERIES
Discharge: HOME | End: 2020-08-19
Payer: COMMERCIAL

## 2020-08-19 ENCOUNTER — HOSPITAL ENCOUNTER (OUTPATIENT)
Dept: PHYSICAL THERAPY | Age: 35
Setting detail: THERAPIES SERIES
Discharge: HOME | End: 2020-08-19
Payer: COMMERCIAL

## 2020-08-19 PROCEDURE — 97110 THERAPEUTIC EXERCISES: CPT

## 2020-08-19 PROCEDURE — 97140 MANUAL THERAPY 1/> REGIONS: CPT

## 2020-08-19 NOTE — FLOWSHEET NOTE
hips for balance and min physical assist of therapist. Session concluded with pt beginning in stance, lifting each knee to lowered mat, and returning to stance x10 with pt reporting mod difficulty. Rest breaks incorporated throughout session due to decreased activity tolerance. Pt education of reasoning behind each task with pt verbalizing understanding. Therapeutic Exercises  Resistance / level Sets/sec Reps Notes   Bicep curl 2# 2 10    Shoulder abduction with elbow flexion at 90 degrees 2# 2 10    Alternating chest press with supination/pronation 2# 2 10    Alternating shoulder press 2# 2 10                                                     Neuromuscular Re-ed / Therapeutic Activities                                                 Manual Intervention                                                     Patient education:  EVAL, POC, HEP, Role of OT, Goals- pt verbalized understanding, pt would benefit from visual aid of HEP in future session      Home Exercise Program:  Pt educated to complete the following bodyweight exercises to increase strength and endurance:  1) shoulder flexion  2) shoulder extension  3) elbow flexion  4) elbow extension  5) shoulder abduction  6) shoulder adduction      Therapeutic Exercise and NMR:  [x] (41969) Provided verbal/tactile cueing for activities related to strengthening, flexibility, endurance, ROM  for improvements in scapular, scapulothoracic and UE control with self care, reaching, carrying, lifting, house/yardwork, driving/computer work.    [] (81514) Provided verbal/tactile cueing for activities related to improving balance, coordination, kinesthetic sense, posture, motor skill, proprioception  to assist with  scapular, scapulothoracic and UE control with self care, reaching, carrying, lifting, house/yardwork, driving/computer work.   [] Comments:    Therapeutic Activities:    [x] (98111 or 65863) Provided verbal/tactile cueing for activities related to improving balance, coordination, kinesthetic sense, posture, motor skill, proprioception and motor activation to allow for proper function of scapular, scapulothoracic and UE control with self care, carrying, lifting, driving/computer work  [] Comments:    Home Exercise Program:    [x] (50232) Reviewed/Progressed HEP activities related to strengthening, flexibility, endurance, ROM of scapular, scapulothoracic and UE control with self care, reaching, carrying, lifting, house/yardwork, driving/computer work  [x] (63722) Reviewed/Progressed HEP activities related to improving balance, coordination, kinesthetic sense, posture, motor skill, proprioception of scapular, scapulothoracic and UE control with self care, reaching, carrying, lifting, house/yardwork, driving/computer work    [] Comments:    Manual Treatments:  PROM / STM / Oscillations-Mobs:  G-I, II, III, IV (PA's, Inf., Post.)  [] (06293) Provided manual therapy to mobilize soft tissue/joints of cervical/CT, scapular GHJ and UE for the purpose of modulating pain, promoting relaxation,  increasing ROM, reducing/eliminating soft tissue swelling/inflammation/restriction, improving soft tissue extensibility and allowing for proper ROM for normal function with self care, reaching, carrying, lifting, house/yardwork, driving/computer work  [] Comments:    ADL Training:  [] (15413) Provided self-care/home management training related to activities of daily living and compensatory training, and/or use of adaptive equipment   [] Comments:     Splinting:  [] Fabrication of:   [] (55252) Orthotic/Prosthetic Management, subsequent encounter  [] (34079) Orthotic management and training (fitting and assessment)  [] Comments:    Modalities:     Charges:  Timed Code Treatment Minutes: 45   Total Treatment Minutes: 45     [] EVAL (LOW) 33430   [] OT Re-eval (51010)  [] EVAL (MOD) 14422   [] EVAL (HIGH) 02749       [x] Mariam (68900) x   3  [] HWLRT(65541)  [] NMR (12595) x     [] Estim Prognosis: [x] Good [] Fair  [] Poor    Patient Requires Follow-up: [x] Yes  [] No    PLAN: See eval  [x] Continue per plan of care [] Alter current plan (see comments)  [] Plan of care initiated [] Hold pending MD visit [] Discharge    Electronically signed by: RACQUEL Barone/L [id number]      Note: If patient does not return for scheduled/ recommended follow up visits, this note will serve as a discharge from care along with most recent update on progress.

## 2020-08-19 NOTE — FLOWSHEET NOTE
[address] [facility] Physical Therapy  Phone: [phone]   Fax: [phone]    Physical Therapy Daily Treatment Note  Date:  2020    Patient Name:  Higinio Fernando    :  1985  MRN: [id number]  Medical/Treatment Diagnosis Information:  · Diagnosis: physical deconditioning  Treatment Diagnosis: decreased B UE and LE stretch, poor endurance, impaired posture, increased muscle tension through R cervicothoracic region, spinal malalignment  Insurance/Certification information:  PT Insurance Information: [facility] ($15 copay, 20 v per year no estim)  Physician Information:  Referring Practitioner: Dr. Nilay Mcadams of care signed (Y/N): []  Yes [x]  No     Date of Patient follow up with Physician: ?     Progress Report: []  Yes  [x]  No     Date Range for reporting period:  Beginnin2020  Ending:     Progress report due (10 Rx/or 30 days whichever is less): visit #69      Recertification due (POC duration/ or 90 days whichever is less): 2020     Visit # Insurance Allowable Auth required? Date Range    20 []  Yes  [x]  No n/a     Latex Allergy:  [x]NO      []YES  Preferred Language for Healthcare:   [x]English       []other:    Functional Scale:        Date assessed:  30 sec STS: raw score 5 reps                                           2020    Pain level:  3/10 abdominal     SUBJECTIVE:  Pt reports constant abdominal pain. Pt states that she had OT already and worked hard but feels okay. No questions regarding HEP.      OBJECTIVE:       RESTRICTIONS/PRECAUTIONS: lifting restriction of 8#, frail, J peg tubing     Exercises/Interventions:     Therapeutic Exercises (79329) Resistance / level Sets/sec Reps Notes   Nustep L4 4 min      LAQ  Seated march     [facility]     Mat therex:  · Supine SLR  · SAQ  · Hip Add squeezes  · Hooklying clamshells        Yellow ball  [place] [facility]   1  1  3 sec  1   X 10 B  X 10 B  X 10 B  X 10 Supine with 2 pillows Seated therex:  · Rows with TB loop  · B ER with TB loop   [place]   2  1   X 10   X 10   First set with [place] [facility]                                      Therapeutic Activities (05249)       posture       Head position                            Neuromuscular Re-ed (00063)       Chin tucks in supine   1 10                                       Manual Intervention (86358)       cervical stretching into L SBing and L rotation, rotational mobs grade 1 to correct L upper t spine rotations, gentle cervical traction in neutral, DTM to L cervical paraspinals       Gentle cervical traction in neutral, STM to B UTs, cervical paraspinals and LS, cervical stretching into L SBing and L rotation, SOR,     X 9 min                                     Pt. Education:  8/14: patient educated on diagnosis, prognosis and expectations for rehab, all patient questions were answered; stressed importance of taking breaks when needed and completing HEP each day in short bouts; encouraged pt to find head neutral every time she passes a mirror to avoid holding head towards the right     Home Exercise Program:  Access Code: [email or web address]   URL: [email or web address]   Date: 08/19/2020   Prepared by: Tony Basilio with Resistance - 10 reps - 1 sets - 2x daily - 7x weekly   Shoulder External Rotation with Resistance - 10 reps - 1 sets - 2x daily - 7x weekly     Access Code: [email or web address]   URL: [email or web address]   Date: 08/14/2020   Prepared by: Natalie Nichols     Exercises   Seated Long Arc Quad - 10 reps - 3 sets - 1x daily - 7x weekly   Seated March - 10 reps - 3 sets - 1x daily - 7x weekly   Supine Active Straight Leg Raise - 10 reps - 3 sets - 1x daily - 7x weekly     Therapeutic Exercise and NMR EXR  [x] (06195) Provided verbal/tactile cueing for activities related to strengthening, flexibility, endurance, ROM for improvements in  [x] LE / Lumbar: LE, proximal hip, and core control with self care, mobility, lifting, ambulation. [x] UE / Cervical: cervical, postural, scapular, scapulothoracic and UE control with self care, reaching, carrying, lifting, house/yardwork, driving, computer work.  [] (79019) Provided verbal/tactile cueing for activities related to improving balance, coordination, kinesthetic sense, posture, motor skill, proprioception to assist with   [] LE / lumbar: LE, proximal hip, and core control in self care, mobility, lifting, ambulation and eccentric single leg control. [] UE / cervical: cervical, scapular, scapulothoracic and UE control with self care, reaching, carrying, lifting, house/yardwork, driving, computer work.   [] (58842) Therapist is in constant attendance of 2 or more patients providing skilled therapy interventions, but not providing any significant amount of measurable one-on-one time to either patient, for improvements in  [] LE / lumbar: LE, proximal hip, and core control in self care, mobility, lifting, ambulation and eccentric single leg control. [] UE / cervical: cervical, scapular, scapulothoracic and UE control with self care, reaching, carrying, lifting, house/yardwork, driving, computer work.      NMR and Therapeutic Activities:    [] (70698 or 04574) Provided verbal/tactile cueing for activities related to improving balance, coordination, kinesthetic sense, posture, motor skill, proprioception and motor activation to allow for proper function of   [] LE: / Lumbar core, proximal hip and LE with self care and ADLs  [] UE / Cervical: cervical, postural, scapular, scapulothoracic and UE control with self care, carrying, lifting, driving, computer work.   [] (67160) Gait Re-education- Provided training and instruction to the patient for proper LE, core and proximal hip recruitment and positioning and eccentric body weight control with ambulation re-education including up and down stairs     Home Management Training / Self Care:  [] (08930) Provided and quad tone / recruitment which will allow for increased overall function including but not limited to self-care, transfers, ambulation, and ascending / descending stairs. Modalities:      Charges:  Timed Code Treatment Minutes: 42   Total Treatment Minutes: 42     [] EVAL - LOW (49736) x 1  [] EVAL - MOD (86598)  [] EVAL - HIGH (24408)  [] RE-EVAL (15743)  [x] JK(41788) x 2       [] Ionto  [] NMR (10312) x       [] Vaso  [x] Manual (44351) x 1      [] Ultrasound  [] TA x        [] Mech Traction (95451)  [] Aquatic Therapy x     [] ES (un) (77742):   [] Home Management Training x  [] ES(attended) (76029)   [] Dry Needling 1-2 muscles (49954):  [] Dry Needling 3+ muscles (044134)  [] Group:      [] Other:     GOALS:  Patient stated goal: gain strength and endurance     Therapist goals for Patient:   Short Term Goals: To be achieved in: 2 weeks  1. Independent in HEP and progression per patient tolerance, in order to prevent re-injury. 2. Patient will have a decrease in pain to facilitate improvement in movement, function, and ADLs as indicated by Functional Deficits. Long Term Goals: To be achieved in: 6 weeks  1. Pt will increase 30 sec STS reps to at least 8 to demo improvement in endurance. 2. Patient will increase strength in B UEs to at least 4+/5 and B LEs to at least 4/5 so patient can stand, walk, and complete ADLs independently. 3. Patient will demonstrate an increase in postural awareness and control to allow for proper functional mobility as indicated by patients Functional Deficits. 4. Patient will be able to amb for 300 feet or greater without sitting rest break to progress towards PLOF. 5. Patient will return to functional activities including cooking without increased symptoms or restriction. Overall Progression Towards Functional goals/ Treatment Progress Update:  [] Patient is progressing as expected towards functional goals listed.     [] Progression is slowed due to

## 2020-08-26 ENCOUNTER — HOSPITAL ENCOUNTER (OUTPATIENT)
Dept: PHYSICAL THERAPY | Age: 35
Setting detail: THERAPIES SERIES
Discharge: HOME | End: 2020-08-26
Payer: COMMERCIAL

## 2020-08-26 ENCOUNTER — HOSPITAL ENCOUNTER (OUTPATIENT)
Dept: OCCUPATIONAL THERAPY | Age: 35
Setting detail: THERAPIES SERIES
Discharge: HOME | End: 2020-08-26
Payer: COMMERCIAL

## 2020-08-26 PROCEDURE — 97530 THERAPEUTIC ACTIVITIES: CPT

## 2020-08-26 PROCEDURE — 97110 THERAPEUTIC EXERCISES: CPT

## 2020-08-26 PROCEDURE — 97140 MANUAL THERAPY 1/> REGIONS: CPT

## 2020-08-26 NOTE — FLOWSHEET NOTE
[facility] - Outpatient Occupational Therapy      Occupational Therapy Daily Treatment Note  Date:  2020    Patient: Juan Givens   : 1985   MRN: [id number]  Referring Physician:  Dr. Ángela Decker Diagnosis Information:  Dx- physical deconditioning                                         Insurance information:  [facility]; ($15 copay, 20 v per year no estim)  Date of Injury:  Date of Surgery:       Visit # Insurance Allowable Date Range   3/12 3/20      Date of Patient follow up with Physician: ?    Progress Note: []  Yes  [x]  No  Next due by: Visit #10      Latex Allergy:  [x]No      []Yes    Pacemaker:  [x] No       [] Yes     Preferred Language for Healthcare:   [x]English       []other:    Pain level:  5/10 constant abdominal pain    SUBJECTIVE:  Pt reports her abdominal pain is slightly worse today and that there are not any positional changes that make it better or worse. Functional Disability Index: Quick DASH: raw score = ; dysfunction = 43%    OBJECTIVE: See eval      RESTRICTIONS/PRECAUTIONS: lifting restriction of 8#, frail, J peg tubing      Exercises/Interventions: Treatment focused on increasing strength and endurance to enhance activity tolerance in ADLs/IADLs. Session initiated with 3 minutes UBE on level 1.0 to warm up. Pt transitioned to elevated mat to complete modified plank on therapy ball for scapular, shoulder, and trunk stability; pt weight shifted forward and back x10 with calf raises with min A for balance and good alignment throughout task. Pt then grasped therapy ball to complete squat into overhead raise with therapy ball x10 with verbal and physical cues for scapular retraction, neutral spine, and weight shift into heels.  Pt sat on alissa disc placed on mat to further engage trunk and challenge balance- pt completed scapular retraction/protraction x10 while holding small bolster horizontally to engage shoulders; pt educated on completion of 10 reps 3-5x/day at home to increase scapular stability and improve posture as forward flexion is noted in spine with slight scapular winging with pt verbalizing understanding. Task progressed to elbow extension and shoulder flexion with hip flexion to push bolster out and shift weight forward before returning to starting position with scapular retraction x8. In this position, pt then completed vertical, horizontal, and diagonal LE slides on slide discs x12 each side with emphasis on maintaining stable trunk and scapular retraction. Pt then completed TE below at cable cross with modifications to further challenge balance and engage core with pt reporting min-mod difficulty. Noted pt difficulty to maintain full grasp on bar, so pt completed additional TE below with flexbar to address weakness. Pt reported she does struggle with functional tasks requiring  strength, such as carrying items and opening jars. Plan to address intrinsic and extrinsic strengthening at end of each session once larger muscles have fatigued for more isolated strengthening.        Therapeutic Exercises  Resistance / level Sets/sec Reps Notes               Shoulder flexion/extension- cable cross 5# 2 5 Each set completed with 1 foot placed on alissa disc   Row at chest height- cable cross 8# 2 5 Each set completed with 1 foot placed on alissa disc   Bicep curl- cable cross 5# 1 10 In stance on bosu ball with min-mod A for balance   Pronation/supination with full grasp Red flexbar 2 5                         Neuromuscular Re-ed / Therapeutic Activities                                                 Manual Intervention                                                     Patient education:  EVAL, POC, HEP, Role of OT, Goals- pt verbalized understanding, pt would benefit from visual aid of HEP in future session      Home Exercise Program:  Pt educated to complete the following bodyweight exercises to increase strength and endurance:  1) shoulder flexion  2) shoulder extension  3) elbow flexion  4) elbow extension  5) shoulder abduction  6) shoulder adduction  8/26/20: scapular retraction/protraction x10, 3-5x/day to increase scapular stability and improve posture      Therapeutic Exercise and NMR:  [x] (70266) Provided verbal/tactile cueing for activities related to strengthening, flexibility, endurance, ROM  for improvements in scapular, scapulothoracic and UE control with self care, reaching, carrying, lifting, house/yardwork, driving/computer work. [x] (16061) Provided verbal/tactile cueing for activities related to improving balance, coordination, kinesthetic sense, posture, motor skill, proprioception  to assist with  scapular, scapulothoracic and UE control with self care, reaching, carrying, lifting, house/yardwork, driving/computer work.   [] Comments:    Therapeutic Activities:    [x] (22717 or 13487) Provided verbal/tactile cueing for activities related to improving balance, coordination, kinesthetic sense, posture, motor skill, proprioception and motor activation to allow for proper function of scapular, scapulothoracic and UE control with self care, carrying, lifting, driving/computer work  [] Comments:    Home Exercise Program:    [x] (20294) Reviewed/Progressed HEP activities related to strengthening, flexibility, endurance, ROM of scapular, scapulothoracic and UE control with self care, reaching, carrying, lifting, house/yardwork, driving/computer work  [x] (02115) Reviewed/Progressed HEP activities related to improving balance, coordination, kinesthetic sense, posture, motor skill, proprioception of scapular, scapulothoracic and UE control with self care, reaching, carrying, lifting, house/yardwork, driving/computer work    [] Comments:    Manual Treatments:  PROM / STM / Oscillations-Mobs:  G-I, II, III, IV (PA's, Inf., Post.)  [] (21696) Provided manual therapy to mobilize soft tissue/joints of cervical/CT, scapular GHJ and UE for the purpose of modulating pain, promoting relaxation,  increasing ROM, reducing/eliminating soft tissue swelling/inflammation/restriction, improving soft tissue extensibility and allowing for proper ROM for normal function with self care, reaching, carrying, lifting, house/yardwork, driving/computer work  [] Comments:    ADL Training:  [] (48565) Provided self-care/home management training related to activities of daily living and compensatory training, and/or use of adaptive equipment   [] Comments:     Splinting:  [] Fabrication of:   [] (71541) Orthotic/Prosthetic Management, subsequent encounter  [] (32195) Orthotic management and training (fitting and assessment)  [] Comments:    Modalities:     Charges:  Timed Code Treatment Minutes: 45   Total Treatment Minutes: 45     [] EVAL (LOW) 91914   [] OT Re-eval (02581)  [] EVAL (MOD) 05295   [] EVAL (HIGH) 44181       [x] Mariam (94797) x   2  [] HBWCV(45118)  [] NMR (93483) x     [] Estim (attended) (85711)   [] Manual (02971 [facility]) x     [] US (36261)  [x] TA (49528) x   1 [] Paraffin (62603)  [] ADL  ([id number] [phone] [id number]) x    [] Splint/L code:    [] Estim (unattended) ([id number])  [] Fluidotherapy (63156)  [] Other:     GOALS:  1) Patient stated goal: To gain strength and activity tolerance needed to return back home independently. [x]? Progressing: []? Met: []? Not Met: []? Adjusted  2) Patient stated goal: To complete bathing in stance with no use of shower chair  [x]? Progressing: []? Met: []? Not Met: []? Adjusted     Therapist goals for Patient:   Short Term Goals: To be achieved in: 30 days  1. Pt will increase  strength in B hands by 10 lbs by 9/14 to increase functional capacity for ADL/IADL tasks  [x]? Progressing: []? Met: []? Not Met: []? Adjusted  2. Pt will demo increased activity tolerance and safety needed to complete cooking task at independent level by 9/14  [x]? Progressing: []? Met: []? Not Met: []? Adjusted  3.  Pt will demo increase shoulder strength as evidence by ability to complete cleaning task with use of broom or vacuum by 9/14  [x]? Progressing: []? Met: []? Not Met: []? Adjusted     Long Term Goals: To be achieved by polly  1. Pt will will report a QuickDASH Symptom Severity Scale score of 30% or less indicating increased safety and functional independence in desired occupational pursuits by discharge. [x]? Progressing: []? Met: []? Not Met: []? Adjusted    Progression Towards Functional goals:  [x] Patient is progressing as expected towards functional goals listed. [] Progression is slowed due to complexities listed. [] Progression has been slowed due to co-morbidities. [] Plan just implemented, too soon to assess goals progression  [] All goals are met  [] Other:     ASSESSMENT:  See eval    Treatment/Activity Tolerance:  [x] Patient tolerated treatment well [] Patient limited by fatique  [] Patient limited by pain  [] Patient limited by other medical complications  [] Other:     Prognosis: [x] Good [] Fair  [] Poor    Patient Requires Follow-up: [x] Yes  [] No    PLAN: See eval  [x] Continue per plan of care [] Alter current plan (see comments)  [] Plan of care initiated [] Hold pending MD visit [] Discharge    Electronically signed by: RACQUEL Archuleta/L [id number]      Note: If patient does not return for scheduled/ recommended follow up visits, this note will serve as a discharge from care along with most recent update on progress.

## 2020-08-26 NOTE — FLOWSHEET NOTE
[address] [facility] Physical Therapy  Phone: [phone]   Fax: [phone]    Physical Therapy Daily Treatment Note  Date:  2020    Patient Name:  Feliz Senior    :  1985  MRN: [id number]  Medical/Treatment Diagnosis Information:  · Diagnosis: physical deconditioning  Treatment Diagnosis: decreased B UE and LE stretch, poor endurance, impaired posture, increased muscle tension through R cervicothoracic region, spinal malalignment  Insurance/Certification information:  PT Insurance Information: [facility] ($15 copay, 20 v per year no estim)  Physician Information:  Referring Practitioner: Dr. Eusebia Chaparro of care signed (Y/N): []  Yes [x]  No     Date of Patient follow up with Physician: ?     Progress Report: []  Yes  [x]  No     Date Range for reporting period:  Beginnin2020  Ending:     Progress report due (10 Rx/or 30 days whichever is less): visit #73      Recertification due (POC duration/ or 90 days whichever is less): 2020     Visit # Insurance Allowable Auth required? Date Range   3/12 20 []  Yes  [x]  No n/a     Latex Allergy:  [x]NO      []YES  Preferred Language for Healthcare:   [x]English       []other:    Functional Scale:        Date assessed:  30 sec STS: raw score 5 reps                                           2020    Pain level:  3/10 abdominal     SUBJECTIVE:  Pt reports no pain other than her abdominal pain which is constant. Pt states she felt okay after last time. Pt reports she fell last week; she was going to sit down on the couch and got dizzy and fell forward. She states he arms are just too weak to catch herself, so she did hit her face. Other than a scratch on her lip, a bruised knee, and general soreness, she has no injuries from the fall.     OBJECTIVE:       RESTRICTIONS/PRECAUTIONS: lifting restriction of 8#, frail, J peg tubing     Exercises/Interventions:     Therapeutic Exercises (32351) Resistance / level Sets/sec Reps Notes   Nustep L3 4 min      IB  HR  2/30 sec  1   10    LAQ  Seated march     [facility] level 0.5     Mat therex:  · Supine SLR  · SAQ  · Hip Add squeezes  · Hooklying clamshells   · Supine shoulder ER stretch        Yellow ball  [place] TB  PVC pipe 10 [id number] with 2 pillows          Done after CT   Seated therex:  · Rows with TB loop  · B ER with TB loop   [place]  [place]   First set with [place] TB   Standing therex:  · Hip 3 way  · FWD step ups  · Lateral step ups   Lime  4 in   4 in    1  1  1   x10 B  x10 B  x10 B   May need orange TB at home   No UE assist, done in // bars   No UE assist, done in // bars                               Therapeutic Activities (12516)       posture       Head position                            Neuromuscular Re-ed (94151)       Chin tucks in supine   1 10                                       Manual Intervention (04306)       cervical stretching into L SBing and L rotation, rotational mobs grade 1 to correct L upper t spine rotations, gentle cervical traction in neutral, DTM to L cervical paraspinals       Gentle cervical traction in neutral, STM to B UTs, cervical paraspinals and LS, cervical stretching into L SBing and L rotation, SOR,         Gentle cervical traction in neutral, STM to B UTs, manual stretching of UTs, LS, and full flexion with rotation, SOR   10 min                             Pt. Education:  8/14: patient educated on diagnosis, prognosis and expectations for rehab, all patient questions were answered; stressed importance of taking breaks when needed and completing HEP each day in short bouts; encouraged pt to find head neutral every time she passes a mirror to avoid holding head towards the right     Home Exercise Program:  Access Code: [email or web address]   URL: [email or web address]   Date: 08/26/2020   [facility] - 10 reps - 1 sets - 2x daily - 7x weekly   Shoulder External Rotation (32463)Reviewed/Progressed HEP activities related to improving balance, coordination, kinesthetic sense, posture, motor skill, proprioception of   [] LE: core, proximal hip and LE for self care, mobility, lifting, and ambulation/stair navigation    [] UE / Cervical: cervical, postural,  scapular, scapulothoracic and UE control with self care, reaching, carrying, lifting, house/yardwork, driving, computer work    Manual Treatments:  PROM / STM / Oscillations-Mobs:  G-I, II, III, IV (PA's, Inf., Post.)  [x] (01371) Provided manual therapy to mobilize LE, proximal hip and/or LS spine soft tissue/joints for the purpose of modulating pain, promoting relaxation,  increasing ROM, reducing/eliminating soft tissue swelling/inflammation/restriction, improving soft tissue extensibility and allowing for proper ROM for normal function with   [] LE / lumbar: self care, mobility, lifting and ambulation. [x] UE / Cervical: self care, reaching, carrying, lifting, house/yardwork, driving, computer work. Modalities:  [] (05497) Vasopneumatic compression: Utilized vasopneumatic compression to decrease edema / swelling for the purpose of improving mobility and quad tone / recruitment which will allow for increased overall function including but not limited to self-care, transfers, ambulation, and ascending / descending stairs.        Modalities:      Charges:  Timed Code Treatment Minutes: 43   Total Treatment Minutes: 43     [] EVAL - LOW (38523) x 1  [] EVAL - MOD (72757)  [] EVAL - HIGH (67369)  [] RE-EVAL (29944)  [x] EW(02287) x 2       [] Ionto  [] NMR (89537) x       [] Vaso  [x] Manual (48560) x 1      [] Ultrasound  [] TA x        [] Mech Traction (85330)  [] Aquatic Therapy x     [] ES (un) (72417):   [] Home Management Training x  [] ES(attended) (48301)   [] Dry Needling 1-2 muscles (72047):  [] Dry Needling 3+ muscles (093802)  [] Group:      [] Other:     GOALS:  Patient stated goal: gain strength and endurance Therapist goals for Patient:   Short Term Goals: To be achieved in: 2 weeks  1. Independent in HEP and progression per patient tolerance, in order to prevent re-injury. 2. Patient will have a decrease in pain to facilitate improvement in movement, function, and ADLs as indicated by Functional Deficits. Long Term Goals: To be achieved in: 6 weeks  1. Pt will increase 30 sec STS reps to at least 8 to demo improvement in endurance. 2. Patient will increase strength in B UEs to at least 4+/5 and B LEs to at least 4/5 so patient can stand, walk, and complete ADLs independently. 3. Patient will demonstrate an increase in postural awareness and control to allow for proper functional mobility as indicated by patients Functional Deficits. 4. Patient will be able to amb for 300 feet or greater without sitting rest break to progress towards PLOF. 5. Patient will return to functional activities including cooking without increased symptoms or restriction. Overall Progression Towards Functional goals/ Treatment Progress Update:  [] Patient is progressing as expected towards functional goals listed. [] Progression is slowed due to complexities/Impairments listed. [] Progression has been slowed due to co-morbidities. [x] Plan just implemented, too soon to assess goals progression <30days   [] Goals require adjustment due to lack of progress  [] Patient is not progressing as expected and requires additional follow up with physician  [] Other    Persisting Functional Limitations/Impairments:  []Sleeping []Sitting               [x]Standing []Transfers        [x]Walking [x]Kneeling               [x]Stairs [x]Squatting / bending   [x]ADLs [x]Reaching  [x]Lifting  [x]Housework  [x]Driving [x]Job related tasks  []Sports/Recreation []Other:        ASSESSMENT:  Provided pt with updated HEP and additional TBs.  Discussed with pt that she is able to adjust band color for exercises depending on how she is feeling that day. Pt may benefit from balance training at future visits to decrease fall risk. Treatment/Activity Tolerance:  [] Patient able to complete tx  [x] Patient limited by fatigue  [] Patient limited by pain  [x] Patient limited by other medical complications  [] Other:     Prognosis: [] Good [x] Fair  [] Poor    Patient Requires Follow-up: [x] Yes  [] No    Plan for next treatment session:  Overall strength - UEs, LEs, abdominals as able, endurance training, posture training     PLAN: See eval. PT 2x / week for 6 weeks. [x] Continue per plan of care [] Alter current plan (see comments)  [] Plan of care initiated [] Hold pending MD visit [] Discharge    Electronically signed by: Chhaya Otero PT, DPT    Note: If patient does not return for scheduled/ recommended follow up visits, this note will serve as a discharge from care along with most recent update on progress.

## 2020-08-28 ENCOUNTER — HOSPITAL ENCOUNTER (OUTPATIENT)
Dept: OCCUPATIONAL THERAPY | Age: 35
Setting detail: THERAPIES SERIES
Discharge: HOME | End: 2020-08-28
Payer: COMMERCIAL

## 2020-08-28 ENCOUNTER — HOSPITAL ENCOUNTER (OUTPATIENT)
Dept: PHYSICAL THERAPY | Age: 35
Setting detail: THERAPIES SERIES
Discharge: HOME | End: 2020-08-28
Payer: COMMERCIAL

## 2020-08-28 PROCEDURE — 97140 MANUAL THERAPY 1/> REGIONS: CPT

## 2020-08-28 PROCEDURE — 97110 THERAPEUTIC EXERCISES: CPT

## 2020-08-28 PROCEDURE — 97530 THERAPEUTIC ACTIVITIES: CPT

## 2020-08-28 NOTE — FLOWSHEET NOTE
[address] [facility] Physical Therapy  Phone: [phone]   Fax: [phone]    Physical Therapy Daily Treatment Note  Date:  2020    Patient Name:  Meka Bennett    :  1985  MRN: [id number]  Medical/Treatment Diagnosis Information:  · Diagnosis: physical deconditioning  Treatment Diagnosis: decreased B UE and LE stretch, poor endurance, impaired posture, increased muscle tension through R cervicothoracic region, spinal malalignment  Insurance/Certification information:  PT Insurance Information: [facility] ($15 copay, 20 v per year no estim)  Physician Information:  Referring Practitioner: Dr. Jarrett Knox of care signed (Y/N): []  Yes [x]  No     Date of Patient follow up with Physician: ?     Progress Report: []  Yes  [x]  No     Date Range for reporting period:  Beginnin2020  Ending:     Progress report due (10 Rx/or 30 days whichever is less): visit #67      Recertification due (POC duration/ or 90 days whichever is less): 2020     Visit # Insurance Allowable Auth required? Date Range    20 []  Yes  [x]  No n/a     Latex Allergy:  [x]NO      []YES  Preferred Language for Healthcare:   [x]English       []other:    Functional Scale:        Date assessed:  30 sec STS: raw score 5 reps                                           2020    Pain level:  4/10 abdominal     SUBJECTIVE:  Pt reports she is just having her regular abdominal pain today. Feels very tired after having OT and PT back to back. Has been getting dizzy lately so she is extra cautious on the stairs.        OBJECTIVE:       RESTRICTIONS/PRECAUTIONS: lifting restriction of 8#, frail, J peg tubing     Exercises/Interventions:     Therapeutic Exercises (95605) Resistance / level Sets/sec Reps Notes   Nustep  Step one    L1   5 min       IB  HR/TR  30 sec  1 x2  x10    LAQ  Seated march     [facility] level 0.5     Mat therex:  · Supine SLR  · SAQ  · Hip Add squeezes  · Hooklying clamshells   · Supine shoulder ER stretch        Yellow ball  [place] TB  PVC pipe 10 [id number] with 2 pillows          Done after CT   Seated therex:  · Rows with TB loop  · B ER with TB loop    Seated on green SB:  Mid rows  LPD  Ant/post tilts  Lateral tilts  cw and ccw circles    [place]  [place]      2 pl  2 pl 1  1  1  1  1x10  x10  x10  x10  x10  First set with [place] TB   Standing therex:  · Hip 3 way  · FWD step ups  · Lateral step ups  · Ham curl  · Squats  · High knee march  · Lateral band steps     4 in   4 in         Lime     1  1  1  1  1  5     x10 B  x10 B  x10 B  x10 B  x10 B  2 steps B   May need orange TB at home   No UE assist, done in // bars   No UE assist, done in // bars  B UE support  B UE support  B UE support   B UE support, small distance to stay close to bag                               Therapeutic Activities (84678)       posture       Head position                            Neuromuscular Re-ed (45537)       Chin tucks in supine                                         Manual Intervention (43691)       cervical stretching into L SBing and L rotation, rotational mobs grade 1 to correct L upper t spine rotations, gentle cervical traction in neutral, DTM to L cervical paraspinals       Gentle cervical traction in neutral, STM to B UTs, cervical paraspinals and LS, cervical stretching into L SBing and L rotation, SOR,         Gentle cervical traction in neutral, STM to B UTs, manual stretching of UTs, LS, and full flexion with rotation, SOR       PROM into L SBing and rot, grade 2 PA mobs through c spine, DTM to R UT and scalenes, SOR, manual traction in neutral gently    x12 min                      Pt. Education:  8/14: patient educated on diagnosis, prognosis and expectations for rehab, all patient questions were answered; stressed importance of taking breaks when needed and completing HEP each day in short bouts; encouraged pt to find head neutral every time she passes a mirror to avoid holding head towards the right     Home Exercise Program:  Access Code: [email or web address]   URL: [employer]/   Date: 08/26/2020   Prepared by: Ubaldo Wilkerson with Resistance - 10 reps - 1 sets - 2x daily - 7x weekly   Shoulder External Rotation with Resistance - 10 reps - 1 sets - 2x daily - 7x weekly   Standing Hip Flexion with Resistance Loop - 10 reps - 2 sets - 1x daily - 7x weekly   Hip Abduction with Resistance Loop - 10 reps - 2 sets - 1x daily - 7x weekly   Hip Extension with Resistance Loop - 10 reps - 2 sets - 1x daily - 7x weekly       Access Code: [email or web address]   URL: [employer]/   Date: 08/19/2020   Prepared by: Ubaldo Wilkerson with Resistance - 10 reps - 1 sets - 2x daily - 7x weekly   Shoulder External Rotation with Resistance - 10 reps - 1 sets - 2x daily - 7x weekly     Access Code: [email or web address]   URL: [employer]/   Date: 08/14/2020   Prepared by: Marcia Citizen     Exercises   Seated Long Arc Quad - 10 reps - 3 sets - 1x daily - 7x weekly   Seated March - 10 reps - 3 sets - 1x daily - 7x weekly   Supine Active Straight Leg Raise - 10 reps - 3 sets - 1x daily - 7x weekly     Therapeutic Exercise and NMR EXR  [x] (32868) Provided verbal/tactile cueing for activities related to strengthening, flexibility, endurance, ROM for improvements in  [x] LE / Lumbar: LE, proximal hip, and core control with self care, mobility, lifting, ambulation. [x] UE / Cervical: cervical, postural, scapular, scapulothoracic and UE control with self care, reaching, carrying, lifting, house/yardwork, driving, computer work.  [] (25868) Provided verbal/tactile cueing for activities related to improving balance, coordination, kinesthetic sense, posture, motor skill, proprioception to assist with   [] LE / lumbar: LE, proximal hip, and core control in self care, mobility, lifting, ambulation and eccentric single leg control. [] UE / cervical: cervical, scapular, scapulothoracic and UE control with self care, reaching, carrying, lifting, house/yardwork, driving, computer work.   [] (48095) Therapist is in constant attendance of 2 or more patients providing skilled therapy interventions, but not providing any significant amount of measurable one-on-one time to either patient, for improvements in  [] LE / lumbar: LE, proximal hip, and core control in self care, mobility, lifting, ambulation and eccentric single leg control. [] UE / cervical: cervical, scapular, scapulothoracic and UE control with self care, reaching, carrying, lifting, house/yardwork, driving, computer work. NMR and Therapeutic Activities:    [] (26541 or 25225) Provided verbal/tactile cueing for activities related to improving balance, coordination, kinesthetic sense, posture, motor skill, proprioception and motor activation to allow for proper function of   [] LE: / Lumbar core, proximal hip and LE with self care and ADLs  [] UE / Cervical: cervical, postural, scapular, scapulothoracic and UE control with self care, carrying, lifting, driving, computer work.   [] (51902) Gait Re-education- Provided training and instruction to the patient for proper LE, core and proximal hip recruitment and positioning and eccentric body weight control with ambulation re-education including up and down stairs     Home Management Training / Self Care:  [] (03864) Provided self-care/home management training related to activities of daily living and compensatory training, and/or use of adaptive equipment for improvement with: ADLs and compensatory training, meal preparation, safety procedures and instruction in use of adaptive equipment, including bathing, grooming, dressing, personal hygiene, basic household cleaning and chores.      Home Exercise Program:    [] (33700) Reviewed/Progressed HEP activities related to strengthening, flexibility, endurance, ROM of   [] LE / Lumbar: core, proximal hip and LE for functional self-care, mobility, lifting and ambulation/stair navigation   [] UE / Cervical: cervical, postural, scapular, scapulothoracic and UE control with self care, reaching, carrying, lifting, house/yardwork, driving, computer work  [] (81130)Reviewed/Progressed HEP activities related to improving balance, coordination, kinesthetic sense, posture, motor skill, proprioception of   [] LE: core, proximal hip and LE for self care, mobility, lifting, and ambulation/stair navigation    [] UE / Cervical: cervical, postural,  scapular, scapulothoracic and UE control with self care, reaching, carrying, lifting, house/yardwork, driving, computer work    Manual Treatments:  PROM / STM / Oscillations-Mobs:  G-I, II, III, IV (PA's, Inf., Post.)  [x] (86000) Provided manual therapy to mobilize LE, proximal hip and/or LS spine soft tissue/joints for the purpose of modulating pain, promoting relaxation,  increasing ROM, reducing/eliminating soft tissue swelling/inflammation/restriction, improving soft tissue extensibility and allowing for proper ROM for normal function with   [] LE / lumbar: self care, mobility, lifting and ambulation. [x] UE / Cervical: self care, reaching, carrying, lifting, house/yardwork, driving, computer work. Modalities:  [] (99381) Vasopneumatic compression: Utilized vasopneumatic compression to decrease edema / swelling for the purpose of improving mobility and quad tone / recruitment which will allow for increased overall function including but not limited to self-care, transfers, ambulation, and ascending / descending stairs. Modalities:    8/28:  MHP to cerv spine x 10 min in supine     Charges:  Timed Code Treatment Minutes: 42   Total Treatment Minutes: 52     [] EVAL - LOW (46854) x 1  [] EVAL - MOD (56543)  [] EVAL - HIGH (43015)  [] RE-EVAL (42486)  [x] JA(07472) x 2       [] Ionto  [] NMR (57906) x       [] Vaso  [x] Manual (01988) x 1      [] Ultrasound  [] TA x        [] [facility] Traction (99944)  [] Aquatic Therapy x     [] ES (un) (91123):   [] Home Management Training x  [] ES(attended) (55327)   [] Dry Needling 1-2 muscles (71060):  [] Dry Needling 3+ muscles (519476)  [] Group:      [] Other:     GOALS:  Patient stated goal: gain strength and endurance     Therapist goals for Patient:   Short Term Goals: To be achieved in: 2 weeks  1. Independent in HEP and progression per patient tolerance, in order to prevent re-injury. 2. Patient will have a decrease in pain to facilitate improvement in movement, function, and ADLs as indicated by Functional Deficits. Long Term Goals: To be achieved in: 6 weeks  1. Pt will increase 30 sec STS reps to at least 8 to demo improvement in endurance. 2. Patient will increase strength in B UEs to at least 4+/5 and B LEs to at least 4/5 so patient can stand, walk, and complete ADLs independently. 3. Patient will demonstrate an increase in postural awareness and control to allow for proper functional mobility as indicated by patients Functional Deficits. 4. Patient will be able to amb for 300 feet or greater without sitting rest break to progress towards PLOF. 5. Patient will return to functional activities including cooking without increased symptoms or restriction. Overall Progression Towards Functional goals/ Treatment Progress Update:  [] Patient is progressing as expected towards functional goals listed. [] Progression is slowed due to complexities/Impairments listed. [] Progression has been slowed due to co-morbidities.   [x] Plan just implemented, too soon to assess goals progression <30days   [] Goals require adjustment due to lack of progress  [] Patient is not progressing as expected and requires additional follow up with physician  [] Other    Persisting Functional Limitations/Impairments:  []Sleeping []Sitting               [x]Standing []Transfers        [x]Walking [x]Kneeling               [x]Stairs [x]Squatting / bending   [x]ADLs [x]Reaching  [x]Lifting  [x]Housework  [x]Driving [x]Job related tasks  []Sports/Recreation []Other:        ASSESSMENT: Pt able to complete new therex without pain, but limited with endurance and strength. Continue to build. R cerv musculature remains tight, but does respond well to manual techniques as shown by decreased tightness and pain. Treatment/Activity Tolerance:  [] Patient able to complete tx  [x] Patient limited by fatigue  [] Patient limited by pain  [x] Patient limited by other medical complications  [] Other:     Prognosis: [] Good [x] Fair  [] Poor    Patient Requires Follow-up: [x] Yes  [] No    Plan for next treatment session:  Overall strength - UEs, LEs, abdominals as able, endurance training, posture training     PLAN: See rian. PT 2x / week for 6 weeks. [x] Continue per plan of care [] Alter current plan (see comments)  [] Plan of care initiated [] Hold pending MD visit [] Discharge    Electronically signed by: Mike Matias PT, DPT    Note: If patient does not return for scheduled/ recommended follow up visits, this note will serve as a discharge from care along with most recent update on progress.

## 2020-08-28 NOTE — FLOWSHEET NOTE
[facility] - Outpatient Occupational Therapy      Occupational Therapy Daily Treatment Note  Date:  2020    Patient: Sandra Melchor   : 1985   MRN: [id number]  Referring Physician:  Dr. Bert Yee Diagnosis Information:  Dx- physical deconditioning                                         Insurance information:  [facility]; ($15 copay, 20 v per year no estim)  Date of Injury:  Date of Surgery:       Visit # Insurance Allowable Date Range         Date of Patient follow up with Physician: ?    Progress Note: []  Yes  [x]  No  Next due by: Visit #10      Latex Allergy:  [x]No      []Yes    Pacemaker:  [x] No       [] Yes     Preferred Language for Healthcare:   [x]English       []other:    Pain level:  5/10 constant abdominal pain    SUBJECTIVE:  Pt reports no new changes. Functional Disability Index: Quick DASH: raw score = ; dysfunction = 43%    OBJECTIVE: See eval      RESTRICTIONS/PRECAUTIONS: lifting restriction of 8#, frail, J peg tubing      Exercises/Interventions: Treatment focused on increasing strength and endurance to enhance activity tolerance in ADLs/IADLs. Session initiated with 4 minutes UBE on level 1.0 to warm up. Pt transitioned to wedge placed on tabletop for modified plank with forearm <> hand SAUL changes x10 with VCs for scap retraction and trunk alignment. Pt pushed wedge forward and back along table x10 in conjunction with half-squats to stands with scapular retraction. Pt then tilted wedge up and down x12 with forward weight shifting onto toes for additional trunk and UE control. Next, pt lifted wedge from table, squatted, placed on floor, and returned to table x10 with forward flexion noted and VCs for thoracic extension and scapular retraction, as well as attempting to keep knees behind toes with increased hip flexion.  Pt transferred to seated position to complete the following fine motor tasks for intrinsic strengthening and coordination: opening/closing jar to retrieve pom poms with tweezers x8; in-hand manipulation of pom poms; mixing yellow and green theraputty, rolling into log, and pinching along length; retrieving beads from theraputty; and in-hand manipulation of beads, dropping 1/8 beads. Pt required cues for shoulder adduction throughout fine motor tasks to reduce compensation of larger muscle groups. Pt educated on completion of theraputty TE at home with written instructions with pt verbalizing understanding. Therapeutic Exercises  Resistance / level Sets/sec Reps Notes                                    Neuromuscular Re-ed / Therapeutic Activities                                                 Manual Intervention                                                     Patient education:  EVAL, POC, HEP, Role of OT, Goals- pt verbalized understanding, pt would benefit from visual aid of HEP in future session      Home Exercise Program:  Pt educated to complete the following bodyweight exercises to increase strength and endurance:  1) shoulder flexion  2) shoulder extension  3) elbow flexion  4) elbow extension  5) shoulder abduction  6) shoulder adduction  8/26/20: scapular retraction/protraction x10, 3-5x/day to increase scapular stability and improve posture  8/28/20: theraputty TE; 10 minutes max 2-4x/day      Therapeutic Exercise and NMR:  [x] (49964) Provided verbal/tactile cueing for activities related to strengthening, flexibility, endurance, ROM  for improvements in scapular, scapulothoracic and UE control with self care, reaching, carrying, lifting, house/yardwork, driving/computer work. [x] (97235) Provided verbal/tactile cueing for activities related to improving balance, coordination, kinesthetic sense, posture, motor skill, proprioception  to assist with  scapular, scapulothoracic and UE control with self care, reaching, carrying, lifting, house/yardwork, driving/computer work.   [] Comments:    Therapeutic Activities:    [x] (84227 or 99695) Provided verbal/tactile cueing for activities related to improving balance, coordination, kinesthetic sense, posture, motor skill, proprioception and motor activation to allow for proper function of scapular, scapulothoracic and UE control with self care, carrying, lifting, driving/computer work  [] Comments:    Home Exercise Program:    [x] (18551) Reviewed/Progressed HEP activities related to strengthening, flexibility, endurance, ROM of scapular, scapulothoracic and UE control with self care, reaching, carrying, lifting, house/yardwork, driving/computer work  [x] (89980) Reviewed/Progressed HEP activities related to improving balance, coordination, kinesthetic sense, posture, motor skill, proprioception of scapular, scapulothoracic and UE control with self care, reaching, carrying, lifting, house/yardwork, driving/computer work    [] Comments:    Manual Treatments:  PROM / STM / Oscillations-Mobs:  G-I, II, III, IV (PA's, Inf., Post.)  [] ([id number]) Provided manual therapy to mobilize soft tissue/joints of cervical/CT, scapular GHJ and UE for the purpose of modulating pain, promoting relaxation,  increasing ROM, reducing/eliminating soft tissue swelling/inflammation/restriction, improving soft tissue extensibility and allowing for proper ROM for normal function with self care, reaching, carrying, lifting, house/yardwork, driving/computer work  [] Comments:    ADL Training:  [] (77518) Provided self-care/home management training related to activities of daily living and compensatory training, and/or use of adaptive equipment   [] Comments:     Splinting:  [] Fabrication of:   [] (86952) Orthotic/Prosthetic Management, subsequent encounter  [] (77174) Orthotic management and training (fitting and assessment)  [] Comments:    Modalities:     Charges:  Timed Code Treatment Minutes: 45   Total Treatment Minutes: 45     [] EVAL (LOW) [id number]   [] OT Re-eval (27787)  [] EVAL (MOD) 73526 [] EVAL (HIGH) 92852       [x] Mariam (33992) x   2  [] KPXPG(07656)  [] NMR (33377) x     [] Estim (attended) (74842)   [] Manual ([id number]) x     [] US (53244)  [x] TA (18240) x   1 [] Paraffin (79321)  [] ADL  (27789) x    [] Splint/L code:    [] Estim (unattended) (22 [id number])  [] Fluidotherapy (99450)  [] Other:     GOALS:  1) Patient stated goal: To gain strength and activity tolerance needed to return back home independently. [x]? Progressing: []? Met: []? Not Met: []? Adjusted  2) Patient stated goal: To complete bathing in stance with no use of shower chair  [x]? Progressing: []? Met: []? Not Met: []? Adjusted     Therapist goals for Patient:   Short Term Goals: To be achieved in: 30 days  1. Pt will increase  strength in B hands by 10 lbs by 9/14 to increase functional capacity for ADL/IADL tasks  [x]? Progressing: []? Met: []? Not Met: []? Adjusted  2. Pt will demo increased activity tolerance and safety needed to complete cooking task at independent level by 9/14  [x]? Progressing: []? Met: []? Not Met: []? Adjusted  3. Pt will demo increase shoulder strength as evidence by ability to complete cleaning task with use of broom or vacuum by 9/14  [x]? Progressing: []? Met: []? Not Met: []? Adjusted     Long Term Goals: To be achieved by polly  1. Pt will will report a QuickDASH Symptom Severity Scale score of 30% or less indicating increased safety and functional independence in desired occupational pursuits by discharge. [x]? Progressing: []? Met: []? Not Met: []? Adjusted    Progression Towards Functional goals:  [x] Patient is progressing as expected towards functional goals listed. [] Progression is slowed due to complexities listed. [] Progression has been slowed due to co-morbidities.   [] Plan just implemented, too soon to assess goals progression  [] All goals are met  [] Other:     ASSESSMENT:  See eval    Treatment/Activity Tolerance:  [x] Patient tolerated treatment well [] Patient limited

## 2020-09-01 ENCOUNTER — HOSPITAL ENCOUNTER (OUTPATIENT)
Dept: PHYSICAL THERAPY | Age: 35
Setting detail: THERAPIES SERIES
Discharge: HOME | End: 2020-09-01
Payer: COMMERCIAL

## 2020-09-01 ENCOUNTER — HOSPITAL ENCOUNTER (OUTPATIENT)
Dept: OCCUPATIONAL THERAPY | Age: 35
Setting detail: THERAPIES SERIES
Discharge: HOME | End: 2020-09-01
Payer: COMMERCIAL

## 2020-09-01 PROCEDURE — 97530 THERAPEUTIC ACTIVITIES: CPT

## 2020-09-01 PROCEDURE — 97140 MANUAL THERAPY 1/> REGIONS: CPT

## 2020-09-01 PROCEDURE — 97110 THERAPEUTIC EXERCISES: CPT

## 2020-09-01 NOTE — FLOWSHEET NOTE
[facility] - Outpatient Occupational Therapy      Occupational Therapy Daily Treatment Note  Date:  2020    Patient: Dagoberto Traore   : 1985   MRN: [id number]  Referring Physician:  Dr. Estuardo Arredondo Diagnosis Information:  Dx- physical deconditioning                                         Insurance information:  [facility]; ($15 copay, 20 v per year no estim)  Date of Injury:  Date of Surgery:       Visit # Insurance Allowable Date Range         Date of Patient follow up with Physician: ?    Progress Note: []  Yes  [x]  No  Next due by: Visit #10      Latex Allergy:  [x]No      []Yes    Pacemaker:  [x] No       [] Yes     Preferred Language for Healthcare:   [x]English       []other:    Pain level:  5/10 constant abdominal pain    SUBJECTIVE:  Pt reports no new changes. Functional Disability Index: Quick DASH: raw score = ; dysfunction = 43%    OBJECTIVE: See eval      RESTRICTIONS/PRECAUTIONS: lifting restriction of 8#, frail, J peg tubing      Exercises/Interventions: Treatment focused on increasing strength and endurance to enhance activity tolerance in ADLs/IADLs. Session initiated with 4 minutes UBE on level 1.0 to warm up. Pt transitioned to elevated mat for modified plank position on hands, completing walk outs-ins x10 with emphasis on trunk alignment and scapular retraction at top of motion. Pt transferred to quadruped position on mat to complete cat/cow x6 each for scapular and shoulder stability, as well as trunk lengthening and control. Pt then completed alternating UE reach with LE lift x8 each side with min progressing to no assist for balance and trunk stability. Pt educated on completion at home as part of HEP, along with modifications to grade up/down, with pt verbalizing understanding.  Next, pt  transferred to seated position at table to complete TE below alternating between targeting muscles of forearm and hand to fatigue larger muscle group to better isolate intrinsics. Pt required support during intrinsic strengthening at thumb and metacarpals for stability and to prevent hyperextension. Session concluded with fine motor task for intrinsic strengthening and coordination using clothespin tree with pt dropping 2/18 clothespins during in-hand manipulation; verbal cues for shoulder depression and abduction to prevent compensation. Therapeutic Exercises  Resistance / level Sets/sec Reps Notes               Pronation/supination with full grasp Red flexbar 2 5 Isolated finger flexion Yellow digiflex 1 5    Short arc supination/pronation Large mallet held in middle 2 5    Finger abduction/adduction Yellow finger web 2 5    Neuromuscular Re-ed / Therapeutic Activities                                                 Manual Intervention                                                     Patient education:  EVAL, POC, HEP, Role of OT, Goals- pt verbalized understanding, pt would benefit from visual aid of HEP in future session      Home Exercise Program:  Pt educated to complete the following bodyweight exercises to increase strength and endurance:  1) shoulder flexion  2) shoulder extension  3) elbow flexion  4) elbow extension  5) shoulder abduction  6) shoulder adduction  8/26/20: scapular retraction/protraction x10, 3-5x/day to increase scapular stability and improve posture  8/28/20: theraputty TE; 10 minutes max 2-4x/day  9/1/20: cat/cow and alternating UE reach with LE lift in quadruped      Therapeutic Exercise and NMR:  [x] (77079) Provided verbal/tactile cueing for activities related to strengthening, flexibility, endurance, ROM  for improvements in scapular, scapulothoracic and UE control with self care, reaching, carrying, lifting, house/yardwork, driving/computer work.     [x] (54944) Provided verbal/tactile cueing for activities related to improving balance, coordination, kinesthetic sense, posture, motor skill, proprioception  to assist with scapular, scapulothoracic and UE control with self care, reaching, carrying, lifting, house/yardwork, driving/computer work.   [] Comments:    Therapeutic Activities:    [x] (46056 or 84548) Provided verbal/tactile cueing for activities related to improving balance, coordination, kinesthetic sense, posture, motor skill, proprioception and motor activation to allow for proper function of scapular, scapulothoracic and UE control with self care, carrying, lifting, driving/computer work  [] Comments:    Home Exercise Program:    [x] (73318) Reviewed/Progressed HEP activities related to strengthening, flexibility, endurance, ROM of scapular, scapulothoracic and UE control with self care, reaching, carrying, lifting, house/yardwork, driving/computer work  [x] (43072) Reviewed/Progressed HEP activities related to improving balance, coordination, kinesthetic sense, posture, motor skill, proprioception of scapular, scapulothoracic and UE control with self care, reaching, carrying, lifting, house/yardwork, driving/computer work    [] Comments:    Manual Treatments:  PROM / STM / Oscillations-Mobs:  G-I, II, III, IV (PA's, Inf., Post.)  [] (20805) Provided manual therapy to mobilize soft tissue/joints of cervical/CT, scapular GHJ and UE for the purpose of modulating pain, promoting relaxation,  increasing ROM, reducing/eliminating soft tissue swelling/inflammation/restriction, improving soft tissue extensibility and allowing for proper ROM for normal function with self care, reaching, carrying, lifting, house/yardwork, driving/computer work  [] Comments:    ADL Training:  [] (57495) Provided self-care/home management training related to activities of daily living and compensatory training, and/or use of adaptive equipment   [] Comments:     Splinting:  [] Fabrication of:   [] (86856) Orthotic/Prosthetic Management, subsequent encounter  [] (62927) Orthotic management and training (fitting and assessment)  [] Comments:    Modalities:     Charges:  Timed Code Treatment Minutes: 45   Total Treatment Minutes: 45     [] EVAL (LOW) 49452   [] OT Re-eval (28155)  [] EVAL (MOD) 75600   [] EVAL (HIGH) 37479       [x] Mariam (12610) x   1  [] MYMFG(74758)  [] NMR (95398) x     [] Estim (attended) (91017)   [] Manual ([id number]) x     [] AX (05647)  [x] TA (13320) x   2 [] Paraffin (77602)  [] ADL  (27096) x    [] Splint/L code:    [] Estim (unattended) (18180)  [] Fluidotherapy (67942)  [] Other:     GOALS:  1) Patient stated goal: To gain strength and activity tolerance needed to return back home independently. [x]? Progressing: []? Met: []? Not Met: []? Adjusted  2) Patient stated goal: To complete bathing in stance with no use of shower chair  [x]? Progressing: []? Met: []? Not Met: []? Adjusted     Therapist goals for Patient:   Short Term Goals: To be achieved in: 30 days  1. Pt will increase  strength in B hands by 10 lbs by 9/14 to increase functional capacity for ADL/IADL tasks  [x]? Progressing: []? Met: []? Not Met: []? Adjusted  2. Pt will demo increased activity tolerance and safety needed to complete cooking task at independent level by 9/14  [x]? Progressing: []? Met: []? Not Met: []? Adjusted  3. Pt will demo increase shoulder strength as evidence by ability to complete cleaning task with use of broom or vacuum by 9/14  [x]? Progressing: []? Met: []? Not Met: []? Adjusted     Long Term Goals: To be achieved by polly  1. Pt will will report a QuickDASH Symptom Severity Scale score of 30% or less indicating increased safety and functional independence in desired occupational pursuits by discharge. [x]? Progressing: []? Met: []? Not Met: []? Adjusted    Progression Towards Functional goals:  [x] Patient is progressing as expected towards functional goals listed. [] Progression is slowed due to complexities listed. [] Progression has been slowed due to co-morbidities.   [] Plan just implemented, too soon to assess goals progression  [] All goals are met  [] Other:     ASSESSMENT:  See eval    Treatment/Activity Tolerance:  [x] Patient tolerated treatment well [] Patient limited by fatique  [] Patient limited by pain  [] Patient limited by other medical complications  [] Other:     Prognosis: [x] Good [] Fair  [] Poor    Patient Requires Follow-up: [x] Yes  [] No    PLAN: See eval  [x] Continue per plan of care [] Alter current plan (see comments)  [] Plan of care initiated [] Hold pending MD visit [] Discharge    Electronically signed by: Dagoberto Adrian OTR/L [id number]      Note: If patient does not return for scheduled/ recommended follow up visits, this note will serve as a discharge from care along with most recent update on progress.

## 2020-09-01 NOTE — FLOWSHEET NOTE
ball  [place]  PVC pipe Supine with 2 pillows          Done after CT   Seated therex:  · Rows with TB loop  · B ER with TB loop    Seated on green SB:  Mid rows  LPD  High rows  Ant/post tilts  Lateral tilts  cw and ccw circles    [place]      2 pl  2 pl  2 pl 1  1  1  1  1  1x10  x10  x10  x20  x20  x20  First set with [place] TB   Standing therex:  · Hip 3 way  · FWD step ups  · Lateral step ups  · Ham curl  · Squats  · High knee march  · Lateral band steps     4 in   4 in         [place]     1  1  1  1  1  5     x10 B  x10 B  x10 B  x10 B  x10 B  2 steps B   May need orange TB at home   No UE assist, done in // bars   No UE assist, done in // bars  B UE support  B UE support  B UE support   B UE support, small distance to stay close to bag   Gliders - retro  1 10 B    Gliders - lateral  1 10 B                  Therapeutic Activities (53581)       posture       Head position                            Neuromuscular Re-ed (14319)       Chin tucks in supine                                         Manual Intervention (18248)       cervical stretching into L SBing and L rotation, rotational mobs grade 1 to correct L upper t spine rotations, gentle cervical traction in neutral, DTM to L cervical paraspinals       Gentle cervical traction in neutral, STM to B UTs, cervical paraspinals and LS, cervical stretching into L SBing and L rotation, SOR,         Gentle cervical traction in neutral, STM to B UTs, manual stretching of UTs, LS, and full flexion with rotation, SOR       PROM into SBing and rot, grade 2 PA mobs through c spine, DTM to R UT and scalenes, SOR, manual traction in neutral gently    x10 min 8/31: Increased tightness when SBing and rot to R                     Pt. Education:  8/14: patient educated on diagnosis, prognosis and expectations for rehab, all patient questions were answered; stressed importance of taking breaks when needed and completing HEP each day in short bouts; encouraged pt to find head neutral every time she passes a mirror to avoid holding head towards the right     Home Exercise Program:  Access Code: [email or web address]   URL: [email or web address]   Date: 08/26/2020   Prepared by: Miguel Whitehead with Resistance - 10 reps - 1 sets - 2x daily - 7x weekly   Shoulder External Rotation with Resistance - 10 reps - 1 sets - 2x daily - 7x weekly   Standing Hip Flexion with Resistance Loop - 10 reps - 2 sets - 1x daily - 7x weekly   Hip Abduction with Resistance Loop - 10 reps - 2 sets - 1x daily - 7x weekly   Hip Extension with Resistance Loop - 10 reps - 2 sets - 1x daily - 7x weekly       Access Code: [email or web address]   URL: [employer]/   Date: 08/19/2020   Prepared by: Miguel Whitehead with Resistance - 10 reps - 1 sets - 2x daily - 7x weekly   Shoulder External Rotation with Resistance - 10 reps - 1 sets - 2x daily - 7x weekly     Access Code: [email or web address]   URL: [email or web address]   Date: 08/14/2020   Prepared by: Yandel Perez     Exercises   Seated Long Arc Quad - 10 reps - 3 sets - 1x daily - 7x weekly   Seated March - 10 reps - 3 sets - 1x daily - 7x weekly   Supine Active Straight Leg Raise - 10 reps - 3 sets - 1x daily - 7x weekly     Therapeutic Exercise and NMR EXR  [x] (12609) Provided verbal/tactile cueing for activities related to strengthening, flexibility, endurance, ROM for improvements in  [x] LE / Lumbar: LE, proximal hip, and core control with self care, mobility, lifting, ambulation.   [x] UE / Cervical: cervical, postural, scapular, scapulothoracic and UE control with self care, reaching, carrying, lifting, house/yardwork, driving, computer work.  [] (90676) Provided verbal/tactile cueing for activities related to improving balance, coordination, kinesthetic sense, posture, motor skill, proprioception to assist with   [] LE / lumbar: LE, proximal hip, and core control in self care, mobility, lifting, ambulation and eccentric single leg control. [] UE / cervical: cervical, scapular, scapulothoracic and UE control with self care, reaching, carrying, lifting, house/yardwork, driving, computer work.   [] (59424) Therapist is in constant attendance of 2 or more patients providing skilled therapy interventions, but not providing any significant amount of measurable one-on-one time to either patient, for improvements in  [] LE / lumbar: LE, proximal hip, and core control in self care, mobility, lifting, ambulation and eccentric single leg control. [] UE / cervical: cervical, scapular, scapulothoracic and UE control with self care, reaching, carrying, lifting, house/yardwork, driving, computer work. NMR and Therapeutic Activities:    [] (52400 or 31506) Provided verbal/tactile cueing for activities related to improving balance, coordination, kinesthetic sense, posture, motor skill, proprioception and motor activation to allow for proper function of   [] LE: / Lumbar core, proximal hip and LE with self care and ADLs  [] UE / Cervical: cervical, postural, scapular, scapulothoracic and UE control with self care, carrying, lifting, driving, computer work.   [] (44856) Gait Re-education- Provided training and instruction to the patient for proper LE, core and proximal hip recruitment and positioning and eccentric body weight control with ambulation re-education including up and down stairs     Home Management Training / Self Care:  [] (05467) Provided self-care/home management training related to activities of daily living and compensatory training, and/or use of adaptive equipment for improvement with: ADLs and compensatory training, meal preparation, safety procedures and instruction in use of adaptive equipment, including bathing, grooming, dressing, personal hygiene, basic household cleaning and chores.      Home Exercise Program:    [] (62865) Reviewed/Progressed HEP activities related to strengthening, flexibility, endurance, ROM of   [] LE / Lumbar: core, proximal hip and LE for functional self-care, mobility, lifting and ambulation/stair navigation   [] UE / Cervical: cervical, postural, scapular, scapulothoracic and UE control with self care, reaching, carrying, lifting, house/yardwork, driving, computer work  [] (01710)Reviewed/Progressed HEP activities related to improving balance, coordination, kinesthetic sense, posture, motor skill, proprioception of   [] LE: core, proximal hip and LE for self care, mobility, lifting, and ambulation/stair navigation    [] UE / Cervical: cervical, postural,  scapular, scapulothoracic and UE control with self care, reaching, carrying, lifting, house/yardwork, driving, computer work    Manual Treatments:  PROM / STM / Oscillations-Mobs:  G-I, II, III, IV (PA's, Inf., Post.)  [x] (99194) Provided manual therapy to mobilize LE, proximal hip and/or LS spine soft tissue/joints for the purpose of modulating pain, promoting relaxation,  increasing ROM, reducing/eliminating soft tissue swelling/inflammation/restriction, improving soft tissue extensibility and allowing for proper ROM for normal function with   [] LE / lumbar: self care, mobility, lifting and ambulation. [x] UE / Cervical: self care, reaching, carrying, lifting, house/yardwork, driving, computer work. Modalities:  [] (08070) Vasopneumatic compression: Utilized vasopneumatic compression to decrease edema / swelling for the purpose of improving mobility and quad tone / recruitment which will allow for increased overall function including but not limited to self-care, transfers, ambulation, and ascending / descending stairs.        Modalities:    8/28, 9/1: MHP to cerv spine x 10 min in supine     Charges:  Timed Code Treatment Minutes: 45   Total Treatment Minutes: 55     [] EVAL - LOW (83803) x   [] EVAL - MOD (39156)  [] EVAL - HIGH (08009)  [] RE-EVAL (91524)  [x] JH(37957) x 2       [] Ionto  [] NMR (67270) x [] Vaso  [x] Manual (85311) x 1      [] Ultrasound  [] TA x        [] Mech Traction (14062)  [] Aquatic Therapy x     [] ES (un) (98242):   [] Home Management Training x  [] ES(attended) (18917)   [] Dry Needling 1-2 muscles (93722):  [] Dry Needling 3+ muscles (160290)  [] Group:      [] Other:     GOALS:  Patient stated goal: gain strength and endurance     Therapist goals for Patient:   Short Term Goals: To be achieved in: 2 weeks  1. Independent in HEP and progression per patient tolerance, in order to prevent re-injury. 2. Patient will have a decrease in pain to facilitate improvement in movement, function, and ADLs as indicated by Functional Deficits. Long Term Goals: To be achieved in: 6 weeks  1. Pt will increase 30 sec STS reps to at least 8 to demo improvement in endurance. 2. Patient will increase strength in B UEs to at least 4+/5 and B LEs to at least 4/5 so patient can stand, walk, and complete ADLs independently. 3. Patient will demonstrate an increase in postural awareness and control to allow for proper functional mobility as indicated by patients Functional Deficits. 4. Patient will be able to amb for 300 feet or greater without sitting rest break to progress towards PLOF. 5. Patient will return to functional activities including cooking without increased symptoms or restriction. Overall Progression Towards Functional goals/ Treatment Progress Update:  [] Patient is progressing as expected towards functional goals listed. [] Progression is slowed due to complexities/Impairments listed. [] Progression has been slowed due to co-morbidities.   [x] Plan just implemented, too soon to assess goals progression <30days   [] Goals require adjustment due to lack of progress  [] Patient is not progressing as expected and requires additional follow up with physician  [] Other    Persisting Functional Limitations/Impairments:  []Sleeping []Sitting               [x]Standing []Transfers        [x]Walking [x]Kneeling               [x]Stairs [x]Squatting / bending   [x]ADLs [x]Reaching  [x]Lifting  [x]Housework  [x]Driving [x]Job related tasks  []Sports/Recreation []Other:        ASSESSMENT: Pt continues to demonstrate decreased tightness in cervical musculature following manual therapy. She will need continued therapy for increased strength and endurance. Treatment/Activity Tolerance:  [] Patient able to complete tx  [x] Patient limited by fatigue  [] Patient limited by pain  [x] Patient limited by other medical complications  [] Other:     Prognosis: [] Good [x] Fair  [] Poor    Patient Requires Follow-up: [x] Yes  [] No    Plan for next treatment session:  Overall strength - UEs, LEs, abdominals as able, endurance training, posture training     PLAN: See rian. PT 2x / week for 6 weeks. [x] Continue per plan of care [] Alter current plan (see comments)  [] Plan of care initiated [] Hold pending MD visit [] Discharge    Electronically signed by: Mehran Nolan PT, DPT    Note: If patient does not return for scheduled/ recommended follow up visits, this note will serve as a discharge from care along with most recent update on progress.

## 2020-09-03 ENCOUNTER — HOSPITAL ENCOUNTER (OUTPATIENT)
Dept: PHYSICAL THERAPY | Age: 35
Setting detail: THERAPIES SERIES
Discharge: HOME | End: 2020-09-03
Payer: COMMERCIAL

## 2020-09-03 ENCOUNTER — HOSPITAL ENCOUNTER (OUTPATIENT)
Dept: OCCUPATIONAL THERAPY | Age: 35
Setting detail: THERAPIES SERIES
Discharge: HOME | End: 2020-09-03
Payer: COMMERCIAL

## 2020-09-03 PROCEDURE — 97110 THERAPEUTIC EXERCISES: CPT

## 2020-09-03 PROCEDURE — 97530 THERAPEUTIC ACTIVITIES: CPT

## 2020-09-03 PROCEDURE — 97140 MANUAL THERAPY 1/> REGIONS: CPT

## 2020-09-03 NOTE — FLOWSHEET NOTE
[facility]  Phone: [phone]   Fax: [phone]    Physical Therapy Daily Treatment Note  Date:  9/3/2020    Patient Name:  Maki Dc    :  1985  MRN: [id number]  Medical/Treatment Diagnosis Information:  · Diagnosis: physical deconditioning  Treatment Diagnosis: decreased B UE and LE stretch, poor endurance, impaired posture, increased muscle tension through R cervicothoracic region, spinal malalignment  Insurance/Certification information:  PT Insurance Information: [facility] ($15 copay, 20 v per year no estim)  Physician Information:  Referring Practitioner: Dr. Mehdi Mnuoz of care signed (Y/N): []  Yes [x]  No     Date of Patient follow up with Physician: ?     Progress Report: []  Yes  [x]  No     Date Range for reporting period:  Beginnin2020  Ending:     Progress report due (10 Rx/or 30 days whichever is less): visit #32      Recertification due (POC duration/ or 90 days whichever is less): 2020     Visit # Insurance Allowable Auth required? Date Range    20 []  Yes  [x]  No n/a     Latex Allergy:  [x]NO      []YES  Preferred Language for Healthcare:   [x]English       []other:    Functional Scale:        Date assessed:  30 sec STS: raw score 5 reps                                           2020    Pain level:  4/10 abdominal     SUBJECTIVE:  Pt reports she is very tired after therapy back to back. Usually doesn't; do much the rest of the day. Nothing at home is hard to do, but she is limited with how much she can do at once. Feels her strength is still lacking - can only put one plate away at a time instead for 4-5. Neck has been very sore the last few nights.  No       OBJECTIVE:       RESTRICTIONS/PRECAUTIONS: lifting restriction of 8#, frail, J peg tubing     Exercises/Interventions:     Therapeutic Exercises (10970) Resistance / level Sets/sec Reps Notes   Nustep  Step one  L1   5 min       IB  HR/TR LAQ  Seated march     [facility] level 0.5     Mat therex:  · Supine SLR  · SAQ  · Hip Add squeezes  · Hooklying clamshells   · Supine shoulder ER stretch         Supine with 2 pillows          Done after CT   Seated therex:  · Rows with TB loop  · B ER with TB loop    Seated on green SB:  Mid rows  LPD  High rows  Ant/post tilts  Lateral tilts  cw and ccw circles    First set with [place] TB   Standing therex:  · Hip 3 way  · FWD step ups  · Lateral step ups  · Ham curl  · Squats  · High knee march  · Hip abd   · Lateral band steps       2#    2#  2#         2  2  2  2         x10 B  x10 B  x10 B  x10 B     May need orange TB at home   No UE assist, done in // bars   No UE assist, done in // bars  B UE support  B UE support  B UE support   B UE support, small distance to stay close to bag   Gliders - retro  1 10 B    Gliders - lateral  1 10 B    Free weights:  Bicep curl  Hammer curl  Palm down curl   Lat raises   OH press    2#  2#  2#  1#  1#   1  1  1  1  1   x10 B  x10 B  x10 B  x10 B   x10 B    SB rolls on wall  SB rainbows on wall  Sterling  Sterling   1  1 x10  x10                          Therapeutic Activities (87040)       posture       Head position                            Neuromuscular Re-ed (19627)       Chin tucks in supine                                         Manual Intervention (01628)       cervical stretching into L SBing and L rotation, rotational mobs grade 1 to correct L upper t spine rotations, gentle cervical traction in neutral, DTM to L cervical paraspinals       Gentle cervical traction in neutral, STM to B UTs, cervical paraspinals and LS, cervical stretching into L SBing and L rotation, SOR,         Gentle cervical traction in neutral, STM to B UTs, manual stretching of UTs, LS, and full flexion with rotation, SOR       PROM into SBing and rot, grade 2 PA mobs through c spine, DTM to R UT and scalenes, SOR, manual traction in neutral gently, side glides B grade 3   x11 min 8/31: Increased tightness when SBing and rot to R                     Pt. Education:  8/14: patient educated on diagnosis, prognosis and expectations for rehab, all patient questions were answered; stressed importance of taking breaks when needed and completing HEP each day in short bouts; encouraged pt to find head neutral every time she passes a mirror to avoid holding head towards the right     Home Exercise Program:  Access Code: [email or web address]   URL: [employer]/   Date: 08/26/2020   Prepared by: Harriet Martines with Resistance - 10 reps - 1 sets - 2x daily - 7x weekly   Shoulder External Rotation with Resistance - 10 reps - 1 sets - 2x daily - 7x weekly   Standing Hip Flexion with Resistance Loop - 10 reps - 2 sets - 1x daily - 7x weekly   Hip Abduction with Resistance Loop - 10 reps - 2 sets - 1x daily - 7x weekly   Hip Extension with Resistance Loop - 10 reps - 2 sets - 1x daily - 7x weekly       Access Code: [email or web address]   URL: [employer]/   Date: 08/19/2020   Prepared by: Harriet Martines with Resistance - 10 reps - 1 sets - 2x daily - 7x weekly   Shoulder External Rotation with Resistance - 10 reps - 1 sets - 2x daily - 7x weekly     Access Code: [email or web address]   URL: [employer]/   Date: 08/14/2020   Prepared by: Brandi Valiente     Exercises   Seated Long Arc Quad - 10 reps - 3 sets - 1x daily - 7x weekly   Seated March - 10 reps - 3 sets - 1x daily - 7x weekly   Supine Active Straight Leg Raise - 10 reps - 3 sets - 1x daily - 7x weekly     Therapeutic Exercise and NMR EXR  [x] (62372) Provided verbal/tactile cueing for activities related to strengthening, flexibility, endurance, ROM for improvements in  [x] LE / Lumbar: LE, proximal hip, and core control with self care, mobility, lifting, ambulation.   [x] UE / Cervical: cervical, postural, scapular, scapulothoracic and UE control with self care, reaching, carrying, lifting, house/yardwork, driving, computer work.  [] (56652) Provided verbal/tactile cueing for activities related to improving balance, coordination, kinesthetic sense, posture, motor skill, proprioception to assist with   [] LE / lumbar: LE, proximal hip, and core control in self care, mobility, lifting, ambulation and eccentric single leg control. [] UE / cervical: cervical, scapular, scapulothoracic and UE control with self care, reaching, carrying, lifting, house/yardwork, driving, computer work.   [] (97565) Therapist is in constant attendance of 2 or more patients providing skilled therapy interventions, but not providing any significant amount of measurable one-on-one time to either patient, for improvements in  [] LE / lumbar: LE, proximal hip, and core control in self care, mobility, lifting, ambulation and eccentric single leg control. [] UE / cervical: cervical, scapular, scapulothoracic and UE control with self care, reaching, carrying, lifting, house/yardwork, driving, computer work.      NMR and Therapeutic Activities:    [] (88122 or 72542) Provided verbal/tactile cueing for activities related to improving balance, coordination, kinesthetic sense, posture, motor skill, proprioception and motor activation to allow for proper function of   [] LE: / Lumbar core, proximal hip and LE with self care and ADLs  [] UE / Cervical: cervical, postural, scapular, scapulothoracic and UE control with self care, carrying, lifting, driving, computer work.   [] (22271) Gait Re-education- Provided training and instruction to the patient for proper LE, core and proximal hip recruitment and positioning and eccentric body weight control with ambulation re-education including up and down stairs     Home Management Training / Self Care:  [] (71962) Provided self-care/home management training related to activities of daily living and compensatory training, and/or use of adaptive equipment for improvement with: ADLs and compensatory training, meal preparation, safety procedures and instruction in use of adaptive equipment, including bathing, grooming, dressing, personal hygiene, basic household cleaning and chores. Home Exercise Program:    [] (18497) Reviewed/Progressed HEP activities related to strengthening, flexibility, endurance, ROM of   [] LE / Lumbar: core, proximal hip and LE for functional self-care, mobility, lifting and ambulation/stair navigation   [] UE / Cervical: cervical, postural, scapular, scapulothoracic and UE control with self care, reaching, carrying, lifting, house/yardwork, driving, computer work  [] (69325)Reviewed/Progressed HEP activities related to improving balance, coordination, kinesthetic sense, posture, motor skill, proprioception of   [] LE: core, proximal hip and LE for self care, mobility, lifting, and ambulation/stair navigation    [] UE / Cervical: cervical, postural,  scapular, scapulothoracic and UE control with self care, reaching, carrying, lifting, house/yardwork, driving, computer work    Manual Treatments:  PROM / STM / Oscillations-Mobs:  G-I, II, III, IV (PA's, Inf., Post.)  [x] (28909) Provided manual therapy to mobilize LE, proximal hip and/or LS spine soft tissue/joints for the purpose of modulating pain, promoting relaxation,  increasing ROM, reducing/eliminating soft tissue swelling/inflammation/restriction, improving soft tissue extensibility and allowing for proper ROM for normal function with   [] LE / lumbar: self care, mobility, lifting and ambulation. [x] UE / Cervical: self care, reaching, carrying, lifting, house/yardwork, driving, computer work.      Modalities:  [] (95718) Vasopneumatic compression: Utilized vasopneumatic compression to decrease edema / swelling for the purpose of improving mobility and quad tone / recruitment which will allow for increased overall function including but not limited to self-care, transfers, ambulation, and ascending / descending stairs. Modalities:    9/3, 8/28, 9/1: MHP to cerv spine x 10 min in supine     Charges:  Timed Code Treatment Minutes: 41   Total Treatment Minutes: 51     [] EVAL - LOW (85381) x   [] EVAL - MOD (25308)  [] EVAL - HIGH (52884)  [] RE-EVAL (70267)  [x] WF(79349) x 2       [] Ionto  [] NMR (22490) x       [] Vaso  [x] Manual (12548) x 1      [] Ultrasound  [] TA x        [] Mech Traction (78343)  [] Aquatic Therapy x     [] ES (un) (11799):   [] Home Management Training x  [] ES(attended) (46182)   [] Dry Needling 1-2 muscles (62427):  [] Dry Needling 3+ muscles (956790)  [] Group:      [] Other:     GOALS:  Patient stated goal: gain strength and endurance     Therapist goals for Patient:   Short Term Goals: To be achieved in: 2 weeks  1. Independent in HEP and progression per patient tolerance, in order to prevent re-injury. 2. Patient will have a decrease in pain to facilitate improvement in movement, function, and ADLs as indicated by Functional Deficits. Long Term Goals: To be achieved in: 6 weeks  1. Pt will increase 30 sec STS reps to at least 8 to demo improvement in endurance. 2. Patient will increase strength in B UEs to at least 4+/5 and B LEs to at least 4/5 so patient can stand, walk, and complete ADLs independently. 3. Patient will demonstrate an increase in postural awareness and control to allow for proper functional mobility as indicated by patients Functional Deficits. 4. Patient will be able to amb for 300 feet or greater without sitting rest break to progress towards PLOF. 5. Patient will return to functional activities including cooking without increased symptoms or restriction. Overall Progression Towards Functional goals/ Treatment Progress Update:  [] Patient is progressing as expected towards functional goals listed. [] Progression is slowed due to complexities/Impairments listed. [] Progression has been slowed due to co-morbidities.   [x] Plan just implemented, too soon to assess goals progression <30days   [] Goals require adjustment due to lack of progress  [] Patient is not progressing as expected and requires additional follow up with physician  [] Other    Persisting Functional Limitations/Impairments:  []Sleeping []Sitting               [x]Standing []Transfers        [x]Walking [x]Kneeling               [x]Stairs [x]Squatting / bending   [x]ADLs [x]Reaching  [x]Lifting  [x]Housework  [x]Driving [x]Job related tasks  []Sports/Recreation []Other:        ASSESSMENT: Pt demo'd better endurance this date as shown by increased reps with LE activities with addition of new UE strengthening. R UT still tight, however less trigger points than in previous sessions. Continue as above. Treatment/Activity Tolerance:  [] Patient able to complete tx  [x] Patient limited by fatigue  [] Patient limited by pain  [x] Patient limited by other medical complications  [] Other:     Prognosis: [] Good [x] Fair  [] Poor    Patient Requires Follow-up: [x] Yes  [] No    Plan for next treatment session:  Overall strength - UEs, LEs, abdominals as able, endurance training, posture training     PLAN: See rian. PT 2x / week for 6 weeks. [x] Continue per plan of care [] Alter current plan (see comments)  [] Plan of care initiated [] Hold pending MD visit [] Discharge    Electronically signed by: Akhil Carrasco PT, DPT    Note: If patient does not return for scheduled/ recommended follow up visits, this note will serve as a discharge from care along with most recent update on progress.

## 2020-09-03 NOTE — FLOWSHEET NOTE
[facility] - Outpatient Occupational Therapy      Occupational Therapy Daily Treatment Note  Date:  9/3/2020    Patient: Terry Giles   : 1985   MRN: [id number]  Referring Physician:  Dr. Gabe Coppola Diagnosis Information:  Dx- physical deconditioning                                         Insurance information:  [employer]; ($15 copay, 20 v per year no estim)  Date of Injury:  Date of Surgery:       Visit # Insurance Allowable Date Range         Date of Patient follow up with Physician: ?    Progress Note: []  Yes  [x]  No  Next due by: Visit #10      Latex Allergy:  [x]No      []Yes    Pacemaker:  [x] No       [] Yes     Preferred Language for Healthcare:   [x]English       []other:    Pain level:  5/10 constant abdominal pain    SUBJECTIVE:  Pt reports no new changes. Functional Disability Index: Quick DASH: raw score = ; dysfunction = 43%    OBJECTIVE: See eval      RESTRICTIONS/PRECAUTIONS: lifting restriction of 8#, frail, J peg tubing      Exercises/Interventions: Treatment focused on increasing strength and endurance to enhance activity tolerance in ADLs/IADLs. Session initiated with 4 minutes UBE on level 1.0 to warm up. Pt transitioned to quadruped position on mat to review last session's yoga poses into cat/cow due to questions after completing at home; pt re-educated on form with pt verbalizing understanding. Illustrations and written instructions of all poses provided. Pt assumed supine position to complete hip bridges x8 with education on home completion for further trunk lengthening and control with pt verbalizing understanding. Pt completed TE below for strengthening and balance with pt increasing weight on this date and reporting increased endurance throughout tasks. Pt transferred to seated position at table to complete fine motor strengthening with snap beads with in-hand manipulation of 3 beads in hand at each time.      Therapeutic Exercises  Resistance / level Sets/sec Reps Notes               Shoulder flexion/extension- cable cross 10# 1 8 Each set completed with 1 foot placed on alissa disc   Row at chest height- cable cross 10# 1 8 Each set completed with 1 foot placed on alissa disc   Cross body shoulder/elbow flexion/extension 5# 2 8 Each set completed with 1 foot placed on alissa disc   Bicep curl- cable cross 10# 1 8 In stance on bosu ball with min-mod A for balance         Finger abduction/adduction Yellow finger web 2 5    Neuromuscular Re-ed / Therapeutic Activities                                                 Manual Intervention                                                     Patient education:  EVAL, POC, HEP, Role of OT, Goals- pt verbalized understanding, pt would benefit from visual aid of HEP in future session      Home Exercise Program:  Pt educated to complete the following bodyweight exercises to increase strength and endurance:  1) shoulder flexion  2) shoulder extension  3) elbow flexion  4) elbow extension  5) shoulder abduction  6) shoulder adduction  8/26/20: scapular retraction/protraction x10, 3-5x/day to increase scapular stability and improve posture  8/28/20: theraputty TE; 10 minutes max 2-4x/day  9/1/20: cat/cow and alternating UE reach with LE lift in quadruped      Therapeutic Exercise and NMR:  [x] (94770) Provided verbal/tactile cueing for activities related to strengthening, flexibility, endurance, ROM  for improvements in scapular, scapulothoracic and UE control with self care, reaching, carrying, lifting, house/yardwork, driving/computer work. [x] (70209) Provided verbal/tactile cueing for activities related to improving balance, coordination, kinesthetic sense, posture, motor skill, proprioception  to assist with  scapular, scapulothoracic and UE control with self care, reaching, carrying, lifting, house/yardwork, driving/computer work.   [] Comments:    Therapeutic Activities:    [x] (84912 or 28359) Provided verbal/tactile cueing for activities related to improving balance, coordination, kinesthetic sense, posture, motor skill, proprioception and motor activation to allow for proper function of scapular, scapulothoracic and UE control with self care, carrying, lifting, driving/computer work  [] Comments:    Home Exercise Program:    [x] (11761) Reviewed/Progressed HEP activities related to strengthening, flexibility, endurance, ROM of scapular, scapulothoracic and UE control with self care, reaching, carrying, lifting, house/yardwork, driving/computer work  [x] (94687) Reviewed/Progressed HEP activities related to improving balance, coordination, kinesthetic sense, posture, motor skill, proprioception of scapular, scapulothoracic and UE control with self care, reaching, carrying, lifting, house/yardwork, driving/computer work    [] Comments:    Manual Treatments:  PROM / STM / Oscillations-Mobs:  G-I, II, III, IV (PA's, Inf., Post.)  [] (46021) Provided manual therapy to mobilize soft tissue/joints of cervical/CT, scapular GHJ and UE for the purpose of modulating pain, promoting relaxation,  increasing ROM, reducing/eliminating soft tissue swelling/inflammation/restriction, improving soft tissue extensibility and allowing for proper ROM for normal function with self care, reaching, carrying, lifting, house/yardwork, driving/computer work  [] Comments:    ADL Training:  [] (07785) Provided self-care/home management training related to activities of daily living and compensatory training, and/or use of adaptive equipment   [] Comments:     Splinting:  [] Fabrication of:   [] (02495) Orthotic/Prosthetic Management, subsequent encounter  [] (32052) Orthotic management and training (fitting and assessment)  [] Comments:    Modalities:     Charges:  Timed Code Treatment Minutes: 45   Total Treatment Minutes: 45     [] EVAL (LOW) 69949   [] OT Re-eval (86485)  [] EVAL (MOD) 70425   [] EVAL (HIGH) 33659       [x] Mariam [phone]) x   2  [] WGERW(24732)  [] NMR (83494) x     [] Estim (attended) (25117)   [] Manual ([id number]) x     [] US (75997)  [x] TA (58420) x   1 [] Paraffin (78715)  [] ADL  ([phone]) x    [] Splint/L code:    [] Estim (unattended) ([id number])  [] Fluidotherapy (64970)  [] Other:     GOALS:  1) Patient stated goal: To gain strength and activity tolerance needed to return back home independently. [x]? Progressing: []? Met: []? Not Met: []? Adjusted  2) Patient stated goal: To complete bathing in stance with no use of shower chair  [x]? Progressing: []? Met: []? Not Met: []? Adjusted     Therapist goals for Patient:   Short Term Goals: To be achieved in: 30 days  1. Pt will increase  strength in B hands by 10 lbs by 9/14 to increase functional capacity for ADL/IADL tasks  [x]? Progressing: []? Met: []? Not Met: []? Adjusted  2. Pt will demo increased activity tolerance and safety needed to complete cooking task at independent level by 9/14  [x]? Progressing: []? Met: []? Not Met: []? Adjusted  3. Pt will demo increase shoulder strength as evidence by ability to complete cleaning task with use of broom or vacuum by 9/14  [x]? Progressing: []? Met: []? Not Met: []? Adjusted     Long Term Goals: To be achieved by polly  1. Pt will will report a QuickDASH Symptom Severity Scale score of 30% or less indicating increased safety and functional independence in desired occupational pursuits by discharge. [x]? Progressing: []? Met: []? Not Met: []? Adjusted    Progression Towards Functional goals:  [x] Patient is progressing as expected towards functional goals listed. [] Progression is slowed due to complexities listed. [] Progression has been slowed due to co-morbidities.   [] Plan just implemented, too soon to assess goals progression  [] All goals are met  [] Other:     ASSESSMENT:  See eval    Treatment/Activity Tolerance:  [x] Patient tolerated treatment well [] Patient limited by fatique  [] Patient limited by pain  [] Patient limited by other medical complications  [] Other:     Prognosis: [x] Good [] Fair  [] Poor    Patient Requires Follow-up: [x] Yes  [] No    PLAN: See eval  [x] Continue per plan of care [] Alter current plan (see comments)  [] Plan of care initiated [] Hold pending MD visit [] Discharge    Electronically signed by: RACQUEL Choi/L [id number]      Note: If patient does not return for scheduled/ recommended follow up visits, this note will serve as a discharge from care along with most recent update on progress.

## 2020-09-09 ENCOUNTER — HOSPITAL ENCOUNTER (OUTPATIENT)
Dept: OCCUPATIONAL THERAPY | Age: 35
Setting detail: THERAPIES SERIES
Discharge: HOME | End: 2020-09-09
Payer: COMMERCIAL

## 2020-09-09 ENCOUNTER — HOSPITAL ENCOUNTER (OUTPATIENT)
Dept: PHYSICAL THERAPY | Age: 35
Setting detail: THERAPIES SERIES
Discharge: HOME | End: 2020-09-09
Payer: COMMERCIAL

## 2020-09-09 PROCEDURE — 97110 THERAPEUTIC EXERCISES: CPT

## 2020-09-09 PROCEDURE — 97530 THERAPEUTIC ACTIVITIES: CPT

## 2020-09-09 PROCEDURE — 97140 MANUAL THERAPY 1/> REGIONS: CPT

## 2020-09-09 NOTE — FLOWSHEET NOTE
[facility] - Outpatient Occupational Therapy      Occupational Therapy Daily Treatment Note  Date:  2020    Patient: Rach Herbert   : 1985   MRN: [id number]  Referring Physician:  Dr. Beverly Joseph Diagnosis Information:  Dx- physical deconditioning                                         Insurance information:  [facility]; ($15 copay, 20 v per year, no estim)  Date of Injury:  Date of Surgery:       Visit # Insurance Allowable Date Range         Date of Patient follow up with Physician: ?    Progress Note: []  Yes  [x]  No  Next due by: Visit #10      Latex Allergy:  [x]No      []Yes    Pacemaker:  [x] No       [] Yes     Preferred Language for Healthcare:   [x]English       []other:    Pain level:  5/10 constant abdominal pain    SUBJECTIVE:  Pt reports she recently had to sell her home as she will need to stay with her parents indefinitely at this point due to continued health concerns. Pt discouraged by this loss in independence with therapeutic use of self utilized for emotional support. Functional Disability Index: Quick DASH: raw score = ; dysfunction = 43%    OBJECTIVE: See eval      RESTRICTIONS/PRECAUTIONS: lifting restriction of 8#, frail, J peg tubing      Exercises/Interventions: Treatment focused on increasing strength, endurance, and fine motor strength/control to enhance activity tolerance in ADLs/IADLs. Session initiated with pt completed \"knee ups\" on chair, lifting alternating knees to rest on chair combined with overhead reach x10 for trunk lengthening and control. Pt then completed hip flexion/toe taps on chair with overhead reach for additional balance and strengthening benefits. Pt transitioned to sit at table to participate in games with fine motor components added, such as in-hand manipulation of game pieces, retrieving pieces from green theraputty, and using tweezers to place pieces.  Pt reported fatigue in hands following, but no Exercise Program:    [] (54647) Reviewed/Progressed HEP activities related to strengthening, flexibility, endurance, ROM of scapular, scapulothoracic and UE control with self care, reaching, carrying, lifting, house/yardwork, driving/computer work  [] (72602) Reviewed/Progressed HEP activities related to improving balance, coordination, kinesthetic sense, posture, motor skill, proprioception of scapular, scapulothoracic and UE control with self care, reaching, carrying, lifting, house/yardwork, driving/computer work    [] Comments:    Manual Treatments:  PROM / STM / Oscillations-Mobs:  G-I, II, III, IV (PA's, Inf., Post.)  [] (07745) Provided manual therapy to mobilize soft tissue/joints of cervical/CT, scapular GHJ and UE for the purpose of modulating pain, promoting relaxation,  increasing ROM, reducing/eliminating soft tissue swelling/inflammation/restriction, improving soft tissue extensibility and allowing for proper ROM for normal function with self care, reaching, carrying, lifting, house/yardwork, driving/computer work  [] Comments:    ADL Training:  [] (67964) Provided self-care/home management training related to activities of daily living and compensatory training, and/or use of adaptive equipment   [] Comments:     Splinting:  [] Fabrication of:   [] (02367) Orthotic/Prosthetic Management, subsequent encounter  [] (08781) Orthotic management and training (fitting and assessment)  [] Comments:    Modalities:     Charges:  Timed Code Treatment Minutes: 45   Total Treatment Minutes: 45     [] EVAL (LOW) 36027   [] OT Re-eval (37386)  [] EVAL (MOD) 38786   [] EVAL (HIGH) 74392       [x] Mariam (87116) x   1  [] VSHLC(66845)  [] NMR (12138) x     [] Estim (attended) (44407)   [] Manual ([id number]) x     [] US (58185)  [x] TA (94334) x   2 [] Paraffin (63943)  [] ADL  (59 [phone] 60) x    [] Splint/L code:    [] Estim (unattended) (22 [phone])  [] Fluidotherapy (67326)  [] Other:     GOALS:  1) Patient stated goal: To gain strength and activity tolerance needed to return back home independently. [x]? Progressing: []? Met: []? Not Met: []? Adjusted  2) Patient stated goal: To complete bathing in stance with no use of shower chair  [x]? Progressing: []? Met: []? Not Met: []? Adjusted     Therapist goals for Patient:   Short Term Goals: To be achieved in: 30 days  1. Pt will increase  strength in B hands by 10 lbs by 9/14 to increase functional capacity for ADL/IADL tasks  [x]? Progressing: []? Met: []? Not Met: []? Adjusted  2. Pt will demo increased activity tolerance and safety needed to complete cooking task at independent level by 9/14  [x]? Progressing: []? Met: []? Not Met: []? Adjusted  3. Pt will demo increase shoulder strength as evidence by ability to complete cleaning task with use of broom or vacuum by 9/14  [x]? Progressing: []? Met: []? Not Met: []? Adjusted     Long Term Goals: To be achieved by polly  1. Pt will will report a QuickDASH Symptom Severity Scale score of 30% or less indicating increased safety and functional independence in desired occupational pursuits by discharge. [x]? Progressing: []? Met: []? Not Met: []? Adjusted    Progression Towards Functional goals:  [x] Patient is progressing as expected towards functional goals listed. [] Progression is slowed due to complexities listed. [] Progression has been slowed due to co-morbidities.   [] Plan just implemented, too soon to assess goals progression  [] All goals are met  [] Other:     ASSESSMENT:  See eval    Treatment/Activity Tolerance:  [x] Patient tolerated treatment well [] Patient limited by fatique  [] Patient limited by pain  [] Patient limited by other medical complications  [] Other:     Prognosis: [x] Good [] Fair  [] Poor    Patient Requires Follow-up: [x] Yes  [] No    PLAN: See eval  [x] Continue per plan of care [] Alter current plan (see comments)  [] Plan of care initiated [] Hold pending MD visit [] Discharge    Electronically signed by: Chase Odonnell OTR/L [id number]      Note: If patient does not return for scheduled/ recommended follow up visits, this note will serve as a discharge from care along with most recent update on progress.

## 2020-09-09 NOTE — FLOWSHEET NOTE
[facility] Physical Therapy  Phone: [phone]   Fax: [phone]    Physical Therapy Daily Treatment Note  Date:  2020    Patient Name:  Vilma Juan    :  1985  MRN: [id number]  Medical/Treatment Diagnosis Information:  · Diagnosis: physical deconditioning  Treatment Diagnosis: decreased B UE and LE stretch, poor endurance, impaired posture, increased muscle tension through R cervicothoracic region, spinal malalignment  Insurance/Certification information:  PT Insurance Information: [facility] ($15 copay, 20 v per year no estim)  Physician Information:  Referring Practitioner: Dr. Jim Dennis of care signed (Y/N): []  Yes [x]  No     Date of Patient follow up with Physician: ?     Progress Report: []  Yes  [x]  No     Date Range for reporting period:  Beginnin2020  Ending:     Progress report due (10 Rx/or 30 days whichever is less): visit #20      Recertification due (POC duration/ or 90 days whichever is less): 2020     Visit # Insurance Allowable Auth required? Date Range    20 []  Yes  [x]  No n/a     Latex Allergy:  [x]NO      []YES  Preferred Language for Healthcare:   [x]English       []other:    Functional Scale:        Date assessed:  30 sec STS: raw score 5 reps                                           2020    Pain level:  6/10     SUBJECTIVE: Pt reports the last few days have been really bad as far as abdominal pain and upper trap pain. The UT pain has been bad to the point where it wakes her up at night.        OBJECTIVE:       RESTRICTIONS/PRECAUTIONS: lifting restriction of 8#, frail, J peg tubing     Exercises/Interventions:     Therapeutic Exercises (41495) Resistance / level Sets/sec Reps Notes   Nustep  Step one  L4 6 min       IB  HR/TR     LAQ  Seated march     [facility] level 0.5     Mat therex:  · Supine SLR  · SAQ  · Hip Add squeezes  · Hooklying clamshells   · Supine shoulder ER stretch         Supine with 2 pillows          Done after CT   Seated therex:  · Rows with TB loop  · B ER with TB loop    Seated on green SB:  Mid rows  LPD  High rows  Ant/post tilts  Lateral tilts  cw and ccw circles    First set with [place] TB   Standing therex:  · Hip 3 way  · FWD step ups  · Lateral step ups  · Ham curl  · Squats  · High knee march  · Hip abd   · Lateral band steps   May need orange TB at home   No UE assist, done in // bars   No UE assist, done in // bars  B UE support  B UE support  B UE support   B UE support, small distance to stay close to bag   Gliders - retro     Gliders - lateral     Free weights:  Bicep curl  Hammer curl  Palm down curl   Lat raises   OH press   Tricep kick back   2#  2#  2#  1#  1#  1#   1  1  1  1  1  1   x10 B  x10 B  x10 B  x10 B   x10 B  x10 B 9/9:  Done Standing    SB rolls on wall   SB rainbows on wall  [place]   1  1 x10  x10  Small lean fwd for incr stretch                        Therapeutic Activities (80316)       Posture with 1/2 foam roll  · CT with scap squeeze  · B ER      [place]   1  1   10  10    Head position                            Neuromuscular Re-ed (55619)       Chin tucks in supine                                         Manual Intervention (08384)       cervical stretching into L SBing and L rotation, rotational mobs grade 1 to correct L upper t spine rotations, gentle cervical traction in neutral, DTM to L cervical paraspinals       Gentle cervical traction in neutral, STM to B UTs, cervical paraspinals and LS, cervical stretching into L SBing and L rotation, SOR,         Gentle cervical traction in neutral, STM to B UTs, manual stretching of UTs, LS, and full flexion with rotation, SOR       PROM into SBing and rot, grade 2 PA mobs through c spine, DTM to R UT and scalenes, SOR, manual traction in neutral gently, side glides B grade 3    8/31: Increased tightness when SBing and rot to R   DTM to B UT, SOR, STM to cervical paraspinals, manual traction, PROM S + rot, OA nods, grade 2 PA mobs through mid to low C-spine   X 12 min                Pt. Education:  8/14: patient educated on diagnosis, prognosis and expectations for rehab, all patient questions were answered; stressed importance of taking breaks when needed and completing HEP each day in short bouts; encouraged pt to find head neutral every time she passes a mirror to avoid holding head towards the right     Home Exercise Program:  Access Code: [email or web address]   URL: [employer]/   Date: 08/26/2020   Prepared by: Aramis Lovelace with Resistance - 10 reps - 1 sets - 2x daily - 7x weekly   Shoulder External Rotation with Resistance - 10 reps - 1 sets - 2x daily - 7x weekly   Standing Hip Flexion with Resistance Loop - 10 reps - 2 sets - 1x daily - 7x weekly   Hip Abduction with Resistance Loop - 10 reps - 2 sets - 1x daily - 7x weekly   Hip Extension with Resistance Loop - 10 reps - 2 sets - 1x daily - 7x weekly       Access Code: [email or web address]   URL: [employer]/   Date: 08/19/2020   Prepared by: Aramis Lovelace with Resistance - 10 reps - 1 sets - 2x daily - 7x weekly   Shoulder External Rotation with Resistance - 10 reps - 1 sets - 2x daily - 7x weekly     Access Code: [email or web address]   URL: [employer]/   Date: 08/14/2020   Prepared by: Charles Cole     Exercises   Seated Long Arc Quad - 10 reps - 3 sets - 1x daily - 7x weekly   Seated March - 10 reps - 3 sets - 1x daily - 7x weekly   Supine Active Straight Leg Raise - 10 reps - 3 sets - 1x daily - 7x weekly     Therapeutic Exercise and NMR EXR  [x] (76462) Provided verbal/tactile cueing for activities related to strengthening, flexibility, endurance, ROM for improvements in  [x] LE / Lumbar: LE, proximal hip, and core control with self care, mobility, lifting, ambulation.   [x] UE / Cervical: cervical, postural, scapular, scapulothoracic and UE control with self care, reaching, carrying, lifting, house/yardwork, driving, computer work.  [] (32594) Provided verbal/tactile cueing for activities related to improving balance, coordination, kinesthetic sense, posture, motor skill, proprioception to assist with   [] LE / lumbar: LE, proximal hip, and core control in self care, mobility, lifting, ambulation and eccentric single leg control. [] UE / cervical: cervical, scapular, scapulothoracic and UE control with self care, reaching, carrying, lifting, house/yardwork, driving, computer work.   [] (22305) Therapist is in constant attendance of 2 or more patients providing skilled therapy interventions, but not providing any significant amount of measurable one-on-one time to either patient, for improvements in  [] LE / lumbar: LE, proximal hip, and core control in self care, mobility, lifting, ambulation and eccentric single leg control. [] UE / cervical: cervical, scapular, scapulothoracic and UE control with self care, reaching, carrying, lifting, house/yardwork, driving, computer work.      NMR and Therapeutic Activities:    [] (10801 or 44078) Provided verbal/tactile cueing for activities related to improving balance, coordination, kinesthetic sense, posture, motor skill, proprioception and motor activation to allow for proper function of   [] LE: / Lumbar core, proximal hip and LE with self care and ADLs  [] UE / Cervical: cervical, postural, scapular, scapulothoracic and UE control with self care, carrying, lifting, driving, computer work.   [] (18361) Gait Re-education- Provided training and instruction to the patient for proper LE, core and proximal hip recruitment and positioning and eccentric body weight control with ambulation re-education including up and down stairs     Home Management Training / Self Care:  [] (36900) Provided self-care/home management training related to activities of daily living and compensatory training, and/or use of adaptive equipment for improvement with: ADLs and compensatory training, meal preparation, safety procedures and instruction in use of adaptive equipment, including bathing, grooming, dressing, personal hygiene, basic household cleaning and chores. Home Exercise Program:    [] (67501) Reviewed/Progressed HEP activities related to strengthening, flexibility, endurance, ROM of   [] LE / Lumbar: core, proximal hip and LE for functional self-care, mobility, lifting and ambulation/stair navigation   [] UE / Cervical: cervical, postural, scapular, scapulothoracic and UE control with self care, reaching, carrying, lifting, house/yardwork, driving, computer work  [] (82178)Reviewed/Progressed HEP activities related to improving balance, coordination, kinesthetic sense, posture, motor skill, proprioception of   [] LE: core, proximal hip and LE for self care, mobility, lifting, and ambulation/stair navigation    [] UE / Cervical: cervical, postural,  scapular, scapulothoracic and UE control with self care, reaching, carrying, lifting, house/yardwork, driving, computer work    Manual Treatments:  PROM / STM / Oscillations-Mobs:  G-I, II, III, IV (PA's, Inf., Post.)  [x] (91392) Provided manual therapy to mobilize LE, proximal hip and/or LS spine soft tissue/joints for the purpose of modulating pain, promoting relaxation,  increasing ROM, reducing/eliminating soft tissue swelling/inflammation/restriction, improving soft tissue extensibility and allowing for proper ROM for normal function with   [] LE / lumbar: self care, mobility, lifting and ambulation. [x] UE / Cervical: self care, reaching, carrying, lifting, house/yardwork, driving, computer work.      Modalities:  [] (29338) Vasopneumatic compression: Utilized vasopneumatic compression to decrease edema / swelling for the purpose of improving mobility and quad tone / recruitment which will allow for increased overall function including but not limited to self-care, transfers, ambulation, Plan just implemented, too soon to assess goals progression <30days   [] Goals require adjustment due to lack of progress  [] Patient is not progressing as expected and requires additional follow up with physician  [] Other    Persisting Functional Limitations/Impairments:  []Sleeping []Sitting               [x]Standing []Transfers        [x]Walking [x]Kneeling               [x]Stairs [x]Squatting / bending   [x]ADLs [x]Reaching  [x]Lifting  [x]Housework  [x]Driving [x]Job related tasks  []Sports/Recreation []Other:        ASSESSMENT: Pt able to maintain good form with most UE exercises this date, but did have some UT compensation when completing postural exercises along foam roll. Overall endurance improved today, however PT session was prior to OT session this date. Treatment/Activity Tolerance:  [] Patient able to complete tx  [x] Patient limited by fatigue  [] Patient limited by pain  [x] Patient limited by other medical complications  [] Other:     Prognosis: [] Good [x] Fair  [] Poor    Patient Requires Follow-up: [x] Yes  [] No    Plan for next treatment session:  Overall strength - UEs, LEs, abdominals as able, endurance training, posture training     PLAN: See eval. PT 2x / week for 6 weeks. [x] Continue per plan of care [] Alter current plan (see comments)  [] Plan of care initiated [] Hold pending MD visit [] Discharge    Electronically signed by: Meseret Horton PT, DPT    Note: If patient does not return for scheduled/ recommended follow up visits, this note will serve as a discharge from care along with most recent update on progress.

## 2020-09-11 ENCOUNTER — HOSPITAL ENCOUNTER (OUTPATIENT)
Dept: PHYSICAL THERAPY | Age: 35
Setting detail: THERAPIES SERIES
Discharge: HOME | End: 2020-09-11
Payer: COMMERCIAL

## 2020-09-11 ENCOUNTER — HOSPITAL ENCOUNTER (OUTPATIENT)
Dept: OCCUPATIONAL THERAPY | Age: 35
Setting detail: THERAPIES SERIES
Discharge: HOME | End: 2020-09-11
Payer: COMMERCIAL

## 2020-09-11 PROCEDURE — 97110 THERAPEUTIC EXERCISES: CPT

## 2020-09-11 PROCEDURE — 97530 THERAPEUTIC ACTIVITIES: CPT

## 2020-09-11 PROCEDURE — 97140 MANUAL THERAPY 1/> REGIONS: CPT

## 2020-09-11 NOTE — FLOWSHEET NOTE
[facility] Physical Therapy  Phone: [phone]   Fax: [phone]    Physical Therapy Daily Treatment Note  Date:  2020    Patient Name:  Carl Mckenna    :  1985  MRN: [id number]  Medical/Treatment Diagnosis Information:  · Diagnosis: physical deconditioning  Treatment Diagnosis: decreased B UE and LE stretch, poor endurance, impaired posture, increased muscle tension through R cervicothoracic region, spinal malalignment  Insurance/Certification information:  PT Insurance Information: [facility] ($15 copay, 20 v per year no estim)  Physician Information:  Referring Practitioner: Dr. Murillo Speak of care signed (Y/N): []  Yes [x]  No     Date of Patient follow up with Physician: ?     Progress Report: []  Yes  [x]  No     Date Range for reporting period:  Beginnin2020  Ending:     Progress report due (10 Rx/or 30 days whichever is less): visit #77      Recertification due (POC duration/ or 90 days whichever is less): 2020     Visit # Insurance Allowable Auth required? Date Range    20 []  Yes  [x]  No n/a     Latex Allergy:  [x]NO      []YES  Preferred Language for Healthcare:   [x]English       []other:    Functional Scale:        Date assessed:  30 sec STS: raw score 5 reps                                           2020    Pain level:  6/10     SUBJECTIVE: Pt reports she has a new pain in the L side of her abdomin that reaches around to her back. She did see the MD yesterday who told her to watch it for about 1 week. Has a drive by birthday party and zoom baby shower this weekend. Neck is still very stiff.         OBJECTIVE:       RESTRICTIONS/PRECAUTIONS: lifting restriction of 8#, frail, J peg tubing     Exercises/Interventions:     Therapeutic Exercises (99523) Resistance / level Sets/sec Reps Notes   Nustep  Step one   bike Level 1    X 5 min     IB  HR/TR     LAQ  Seated march     [facility] level 0.5     Mat therex:  · Supine SLR  · SAQ  · Hip Add squeezes  · Hooklying clamshells   · Supine shoulder ER stretch         Supine with 2 pillows          Done after CT   Seated therex:  · Rows with TB loop  · B ER with TB loop    Seated on green SB:  Mid rows  LPD  High rows  Ant/post tilts  Lateral tilts  cw and ccw circles    First set with [place] TB   Standing therex:  · Hip 3 way  · FWD step ups  · Lateral step ups  · Ham curl  · Squats  · High knee march  · Hip abd   · Lateral band steps   May need orange TB at home   No UE assist, done in // bars   No UE assist, done in // bars  B UE support  B UE support  B UE support   B UE support, small distance to stay close to bag   Gliders - retro     Gliders - lateral     Free weights:  Bicep curl  Hammer curl  Palm down curl   Lat raises   OH press   Tricep kick back   3#  3#    3#  3#  3#   1  1  1  1  1   x10 B  x10 B    x10 B   x10 B  x10 B 9/9:  Done Standing    SB rolls on wall   SB rainbows on wall  Small lean fwd for incr stretch   Cables:  Mid rows   LPD  Hip ext   Hip abd      3 pl  3 pl  1 pl  1 pl   2  2  2  2   x10  x10  x10 B  x10 B                  Therapeutic Activities (70069)       Posture with 1/2 foam roll  · CT with scap squeeze  · B ER     Head position                            Neuromuscular Re-ed (07514)       Chin tucks in supine                                         Manual Intervention (44039)       cervical stretching into L SBing and L rotation, rotational mobs grade 1 to correct L upper t spine rotations, gentle cervical traction in neutral, DTM to L cervical paraspinals       Gentle cervical traction in neutral, STM to B UTs, cervical paraspinals and LS, cervical stretching into L SBing and L rotation, SOR,         Gentle cervical traction in neutral, STM to B UTs, manual stretching of UTs, LS, and full flexion with rotation, SOR       PROM into SBing and rot, grade 2 PA mobs through c spine, DTM to R UT and scalenes, SOR, manual traction in neutral gently, side judith ALFARO grade 3   x10 min 8/31: Increased tightness when SBing and rot to R   DTM to B UT, SOR, STM to cervical paraspinals, manual traction, PROM S + rot, OA nods, grade 2 PA mobs through mid to low C-spine                   Pt. Education:  8/14: patient educated on diagnosis, prognosis and expectations for rehab, all patient questions were answered; stressed importance of taking breaks when needed and completing HEP each day in short bouts; encouraged pt to find head neutral every time she passes a mirror to avoid holding head towards the right     Home Exercise Program:  Access Code: [email or web address]   URL: [employer]/   Date: 08/26/2020   Prepared by: Carolynn Kenyon with Resistance - 10 reps - 1 sets - 2x daily - 7x weekly   Shoulder External Rotation with Resistance - 10 reps - 1 sets - 2x daily - 7x weekly   Standing Hip Flexion with Resistance Loop - 10 reps - 2 sets - 1x daily - 7x weekly   Hip Abduction with Resistance Loop - 10 reps - 2 sets - 1x daily - 7x weekly   Hip Extension with Resistance Loop - 10 reps - 2 sets - 1x daily - 7x weekly       Access Code: [email or web address]   URL: [employer]/   Date: 08/19/2020   Prepared by: Carolynn Kenyon with Resistance - 10 reps - 1 sets - 2x daily - 7x weekly   Shoulder External Rotation with Resistance - 10 reps - 1 sets - 2x daily - 7x weekly     Access Code: [email or web address]   URL: [employer]/   Date: 08/14/2020   Prepared by: India Waters     Exercises   Seated Long Arc Quad - 10 reps - 3 sets - 1x daily - 7x weekly   Seated March - 10 reps - 3 sets - 1x daily - 7x weekly   Supine Active Straight Leg Raise - 10 reps - 3 sets - 1x daily - 7x weekly     Therapeutic Exercise and NMR EXR  [x] (03629) Provided verbal/tactile cueing for activities related to strengthening, flexibility, endurance, ROM for improvements in  [x] LE / Lumbar: LE, proximal hip, and core Provided self-care/home management training related to activities of daily living and compensatory training, and/or use of adaptive equipment for improvement with: ADLs and compensatory training, meal preparation, safety procedures and instruction in use of adaptive equipment, including bathing, grooming, dressing, personal hygiene, basic household cleaning and chores. Home Exercise Program:    [] (31456) Reviewed/Progressed HEP activities related to strengthening, flexibility, endurance, ROM of   [] LE / Lumbar: core, proximal hip and LE for functional self-care, mobility, lifting and ambulation/stair navigation   [] UE / Cervical: cervical, postural, scapular, scapulothoracic and UE control with self care, reaching, carrying, lifting, house/yardwork, driving, computer work  [] (66888)Reviewed/Progressed HEP activities related to improving balance, coordination, kinesthetic sense, posture, motor skill, proprioception of   [] LE: core, proximal hip and LE for self care, mobility, lifting, and ambulation/stair navigation    [] UE / Cervical: cervical, postural,  scapular, scapulothoracic and UE control with self care, reaching, carrying, lifting, house/yardwork, driving, computer work    Manual Treatments:  PROM / STM / Oscillations-Mobs:  G-I, II, III, IV (PA's, Inf., Post.)  [x] (00705) Provided manual therapy to mobilize LE, proximal hip and/or LS spine soft tissue/joints for the purpose of modulating pain, promoting relaxation,  increasing ROM, reducing/eliminating soft tissue swelling/inflammation/restriction, improving soft tissue extensibility and allowing for proper ROM for normal function with   [] LE / lumbar: self care, mobility, lifting and ambulation. [x] UE / Cervical: self care, reaching, carrying, lifting, house/yardwork, driving, computer work.      Modalities:  [] (31451) Vasopneumatic compression: Utilized vasopneumatic compression to decrease edema / swelling for the purpose of improving mobility and quad tone / recruitment which will allow for increased overall function including but not limited to self-care, transfers, ambulation, and ascending / descending stairs. Modalities:    9/3, 8/28, 9/1: MHP to cerv spine x 10 min in supine     Charges:  Timed Code Treatment Minutes: 43   Total Treatment Minutes: 43     [] EVAL - LOW (56779) x   [] EVAL - MOD (45606)  [] EVAL - HIGH (68616)  [] RE-EVAL (10807)  [x] SB(12676) x 2       [] Ionto  [] NMR (44508) x       [] Vaso  [x] Manual (45216) x 1      [] Ultrasound  [] TA x        [] Mech Traction (95107)  [] Aquatic Therapy x     [] ES (un) (19791):   [] Home Management Training x  [] ES(attended) (35388)   [] Dry Needling 1-2 muscles (39667):  [] Dry Needling 3+ muscles (365186)  [] Group:      [] Other:     GOALS:  Patient stated goal: gain strength and endurance     Therapist goals for Patient:   Short Term Goals: To be achieved in: 2 weeks  1. Independent in HEP and progression per patient tolerance, in order to prevent re-injury. 2. Patient will have a decrease in pain to facilitate improvement in movement, function, and ADLs as indicated by Functional Deficits. Long Term Goals: To be achieved in: 6 weeks  1. Pt will increase 30 sec STS reps to at least 8 to demo improvement in endurance. 2. Patient will increase strength in B UEs to at least 4+/5 and B LEs to at least 4/5 so patient can stand, walk, and complete ADLs independently. 3. Patient will demonstrate an increase in postural awareness and control to allow for proper functional mobility as indicated by patients Functional Deficits. 4. Patient will be able to amb for 300 feet or greater without sitting rest break to progress towards PLOF. 5. Patient will return to functional activities including cooking without increased symptoms or restriction.       Overall Progression Towards Functional goals/ Treatment Progress Update:  [] Patient is progressing as expected towards functional goals listed. [] Progression is slowed due to complexities/Impairments listed. [] Progression has been slowed due to co-morbidities. [x] Plan just implemented, too soon to assess goals progression <30days   [] Goals require adjustment due to lack of progress  [] Patient is not progressing as expected and requires additional follow up with physician  [] Other    Persisting Functional Limitations/Impairments:  []Sleeping []Sitting               [x]Standing []Transfers        [x]Walking [x]Kneeling               [x]Stairs [x]Squatting / bending   [x]ADLs [x]Reaching  [x]Lifting  [x]Housework  [x]Driving [x]Job related tasks  []Sports/Recreation []Other:        ASSESSMENT: R side of neck still sore and tight, but pt able to hold her head in neutral a little better. Able to complete increased weight with strengthening in UEs and LEs without pain. Will most likely be sore at next visit. Continue to monitor. Treatment/Activity Tolerance:  [] Patient able to complete tx  [x] Patient limited by fatigue  [] Patient limited by pain  [x] Patient limited by other medical complications  [] Other:     Prognosis: [] Good [x] Fair  [] Poor    Patient Requires Follow-up: [x] Yes  [] No    Plan for next treatment session:  Overall strength - UEs, LEs, abdominals as able, endurance training, posture training     PLAN: See [facility] PT 2x / week for 6 weeks. [x] Continue per plan of care [] Alter current plan (see comments)  [] Plan of care initiated [] Hold pending MD visit [] Discharge    Electronically signed by: Stevens Burkitt PT, DPT    Note: If patient does not return for scheduled/ recommended follow up visits, this note will serve as a discharge from care along with most recent update on progress.

## 2020-09-11 NOTE — FLOWSHEET NOTE
retrieve with pt dropping 2/10 rubber bands and reporting fatigue following. Therapeutic Exercises  Resistance / level Sets/sec Reps Notes                        Neuromuscular Re-ed / Therapeutic Activities                                                 Manual Intervention                                                     Patient education:  EVAL, POC, HEP, Role of OT, Goals- pt verbalized understanding, pt would benefit from visual aid of HEP in future session      Home Exercise Program:  Pt educated to complete the following bodyweight exercises to increase strength and endurance:  1) shoulder flexion  2) shoulder extension  3) elbow flexion   4) elbow extension  5) shoulder abduction  6) shoulder adduction  8/26/20: scapular retraction/protraction x10, 3-5x/day to increase scapular stability and improve posture  8/28/20: theraputty TE; 10 minutes max 2-4x/day  9/1/20: cat/cow and alternating UE reach with LE lift in quadruped      Therapeutic Exercise and NMR:  [x] (08085) Provided verbal/tactile cueing for activities related to strengthening, flexibility, endurance, ROM  for improvements in scapular, scapulothoracic and UE control with self care, reaching, carrying, lifting, house/yardwork, driving/computer work. [x] (13888) Provided verbal/tactile cueing for activities related to improving balance, coordination, kinesthetic sense, posture, motor skill, proprioception  to assist with  scapular, scapulothoracic and UE control with self care, reaching, carrying, lifting, house/yardwork, driving/computer work.   [] Comments:    Therapeutic Activities:    [x] (42745 or 78015) Provided verbal/tactile cueing for activities related to improving balance, coordination, kinesthetic sense, posture, motor skill, proprioception and motor activation to allow for proper function of scapular, scapulothoracic and UE control with self care, carrying, lifting, driving/computer work  [] Comments:    Home Exercise Program:    [] (99555) Reviewed/Progressed HEP activities related to strengthening, flexibility, endurance, ROM of scapular, scapulothoracic and UE control with self care, reaching, carrying, lifting, house/yardwork, driving/computer work  [] (36238) Reviewed/Progressed HEP activities related to improving balance, coordination, kinesthetic sense, posture, motor skill, proprioception of scapular, scapulothoracic and UE control with self care, reaching, carrying, lifting, house/yardwork, driving/computer work    [] Comments:    Manual Treatments:  PROM / STM / Oscillations-Mobs:  G-I, II, III, IV (PA's, Inf., Post.)  [] (45106) Provided manual therapy to mobilize soft tissue/joints of cervical/CT, scapular GHJ and UE for the purpose of modulating pain, promoting relaxation,  increasing ROM, reducing/eliminating soft tissue swelling/inflammation/restriction, improving soft tissue extensibility and allowing for proper ROM for normal function with self care, reaching, carrying, lifting, house/yardwork, driving/computer work  [] Comments:    ADL Training:  [] (42790) Provided self-care/home management training related to activities of daily living and compensatory training, and/or use of adaptive equipment   [] Comments:     Splinting:  [] Fabrication of:   [] (98919) Orthotic/Prosthetic Management, subsequent encounter  [] (64235) Orthotic management and training (fitting and assessment)  [] Comments:    Modalities:     Charges:  Timed Code Treatment Minutes: 45   Total Treatment Minutes: 45     [] EVAL (LOW) 23721   [] OT Re-eval (73741)  [] EVAL (MOD) 23924   [] EVAL (HIGH) 42655       [x] Mariam (38256) x   1  [] GOVZP(05426)  [] NMR (88103) x     [] Estim (attended) (94814)   [] Manual ([id number]) x     [] US (02054)  [x] TA (74722) x   2 [] Paraffin (84032)  [] ADL  ([phone] [id number]) x    [] Splint/L code:    [] Estim (unattended) (22 [id number])  [] Fluidotherapy (35416)  [] Other:     GOALS:  1) Patient stated goal: To gain strength and activity tolerance needed to return back home independently. [x]? Progressing: []? Met: []? Not Met: []? Adjusted  2) Patient stated goal: To complete bathing in stance with no use of shower chair  [x]? Progressing: []? Met: []? Not Met: []? Adjusted     Therapist goals for Patient:   Short Term Goals: To be achieved in: 30 days  1. Pt will increase  strength in B hands by 10 lbs by 9/14 to increase functional capacity for ADL/IADL tasks  [x]? Progressing: []? Met: []? Not Met: []? Adjusted  2. Pt will demo increased activity tolerance and safety needed to complete cooking task at independent level by 9/14  [x]? Progressing: []? Met: []? Not Met: []? Adjusted  3. Pt will demo increase shoulder strength as evidence by ability to complete cleaning task with use of broom or vacuum by 9/14  [x]? Progressing: []? Met: []? Not Met: []? Adjusted     Long Term Goals: To be achieved by polly  1. Pt will will report a QuickDASH Symptom Severity Scale score of 30% or less indicating increased safety and functional independence in desired occupational pursuits by discharge. [x]? Progressing: []? Met: []? Not Met: []? Adjusted    Progression Towards Functional goals:  [x] Patient is progressing as expected towards functional goals listed. [] Progression is slowed due to complexities listed. [] Progression has been slowed due to co-morbidities.   [] Plan just implemented, too soon to assess goals progression  [] All goals are met  [] Other:     ASSESSMENT:  See eval    Treatment/Activity Tolerance:  [x] Patient tolerated treatment well [] Patient limited by fatique  [] Patient limited by pain  [] Patient limited by other medical complications  [] Other:     Prognosis: [x] Good [] Fair  [] Poor    Patient Requires Follow-up: [x] Yes  [] No    PLAN: See eval  [x] Continue per plan of care [] Alter current plan (see comments)  [] Plan of care initiated [] Hold pending MD visit [] Discharge    Electronically signed by: Dennie Almond, OTR/L [id number]      Note: If patient does not return for scheduled/ recommended follow up visits, this note will serve as a discharge from care along with most recent update on progress.

## 2020-09-14 ENCOUNTER — HOSPITAL ENCOUNTER (OUTPATIENT)
Dept: PHYSICAL THERAPY | Age: 35
Setting detail: THERAPIES SERIES
Discharge: HOME | End: 2020-09-14
Payer: COMMERCIAL

## 2020-09-14 ENCOUNTER — HOSPITAL ENCOUNTER (OUTPATIENT)
Dept: OCCUPATIONAL THERAPY | Age: 35
Setting detail: THERAPIES SERIES
Discharge: HOME | End: 2020-09-14
Payer: COMMERCIAL

## 2020-09-14 PROCEDURE — 97110 THERAPEUTIC EXERCISES: CPT

## 2020-09-14 PROCEDURE — 97530 THERAPEUTIC ACTIVITIES: CPT

## 2020-09-14 NOTE — FLOWSHEET NOTE
2# free weights, lift crate overhead, squat to place crate on the floor, reload crate, and repeat x6 for increased endurance, coordination, and strengthening during functional tasks. Pt completed TE below for intrinsic strengthening with difficulty isolating digits. Pt completed dart thrower motion for forearm and wrist strengthening with tan theraband x8 each side. Session concluded with threading beads through shoe lace and  for improved FM coordination. Therapeutic Exercises  Resistance / level Sets/sec Reps Notes               Isolated finger flexion   Red digiflex 1 5          Neuromuscular Re-ed / Therapeutic Activities                                                 Manual Intervention                                                     Patient education:  EVAL, POC, HEP, Role of OT, Goals- pt verbalized understanding, pt would benefit from visual aid of HEP in future session      Home Exercise Program:  Pt educated to complete the following bodyweight exercises to increase strength and endurance:  1) shoulder flexion  2) shoulder extension  3) elbow flexion   4) elbow extension  5) shoulder abduction  6) shoulder adduction  8/26/20: scapular retraction/protraction x10, 3-5x/day to increase scapular stability and improve posture  8/28/20: theraputty TE; 10 minutes max 2-4x/day  9/1/20: cat/cow and alternating UE reach with LE lift in quadruped      Therapeutic Exercise and NMR:  [x] (51028) Provided verbal/tactile cueing for activities related to strengthening, flexibility, endurance, ROM  for improvements in scapular, scapulothoracic and UE control with self care, reaching, carrying, lifting, house/yardwork, driving/computer work.     [x] (87605) Provided verbal/tactile cueing for activities related to improving balance, coordination, kinesthetic sense, posture, motor skill, proprioception  to assist with  scapular, scapulothoracic and UE control with self care, reaching, carrying, lifting, [] EVAL (LOW) 62938   [] OT Re-eval (38804)  [] EVAL (MOD) 44273   [] EVAL (HIGH) 28220       [x] Mariam (81538) x   1  [] YDAEX(62833)  [] NMR (27602) x     [] Estim (attended) (95283)   [] Manual (01500) x     [] US (88696)  [x] TA (61338) x   2 [] Paraffin (24696)  [] ADL  (88278) x    [] Splint/L code:    [] Estim (unattended) (56311)  [] Fluidotherapy (75573)  [] Other:     GOALS:  1) Patient stated goal: To gain strength and activity tolerance needed to return back home independently. [x]? Progressing: []? Met: []? Not Met: []? Adjusted  2) Patient stated goal: To complete bathing in stance with no use of shower chair  [x]? Progressing: []? Met: []? Not Met: []? Adjusted     Therapist goals for Patient:   Short Term Goals: To be achieved in: 30 days  1. Pt will increase  strength in B hands by 10 lbs by 9/14 to increase functional capacity for ADL/IADL tasks  [x]? Progressing: []? Met: []? Not Met: []? Adjusted  2. Pt will demo increased activity tolerance and safety needed to complete cooking task at independent level by 9/14  [x]? Progressing: []? Met: []? Not Met: []? Adjusted  3. Pt will demo increase shoulder strength as evidence by ability to complete cleaning task with use of broom or vacuum by 9/14  [x]? Progressing: []? Met: []? Not Met: []? Adjusted     Long Term Goals: To be achieved by polly  1. Pt will will report a QuickDASH Symptom Severity Scale score of 30% or less indicating increased safety and functional independence in desired occupational pursuits by discharge. [x]? Progressing: []? Met: []? Not Met: []? Adjusted    Progression Towards Functional goals:  [x] Patient is progressing as expected towards functional goals listed. [] Progression is slowed due to complexities listed. [] Progression has been slowed due to co-morbidities.   [] Plan just implemented, too soon to assess goals progression  [] All goals are met  [] Other:     ASSESSMENT:  See eval    Treatment/Activity Tolerance:  [x] Patient tolerated treatment well [] Patient limited by fatique  [] Patient limited by pain  [] Patient limited by other medical complications  [] Other:     Prognosis: [x] Good [] Fair  [] Poor    Patient Requires Follow-up: [x] Yes  [] No    PLAN: See eval  [x] Continue per plan of care [] Alter current plan (see comments)  [] Plan of care initiated [] Hold pending MD visit [] Discharge    Electronically signed by: RACQUEL Remy/L [id number]      Note: If patient does not return for scheduled/ recommended follow up visits, this note will serve as a discharge from care along with most recent update on progress.

## 2020-09-14 NOTE — FLOWSHEET NOTE
[facility]  Phone: [phone]   Fax: [phone]    Physical Therapy Daily Treatment Note  Date:  2020    Patient Name:  Rom Chahal    :  1985  MRN: [id number]  Medical/Treatment Diagnosis Information:  · Diagnosis: physical deconditioning  Treatment Diagnosis: decreased B UE and LE stretch, poor endurance, impaired posture, increased muscle tension through R cervicothoracic region, spinal malalignment  Insurance/Certification information:  PT Insurance Information: [facility] ($15 copay, 20 v per year no estim)  Physician Information:  Referring Practitioner: Dr. Connolly Barrier of care signed (Y/N): []  Yes [x]  No     Date of Patient follow up with Physician: ?     Progress Report: []  Yes  [x]  No     Date Range for reporting period:  Beginnin2020  Ending:     Progress report due (10 Rx/or 30 days whichever is less): visit #73      Recertification due (POC duration/ or 90 days whichever is less): 2020     Visit # Insurance Allowable Auth required? Date Range    20 []  Yes  [x]  No n/a     Latex Allergy:  [x]NO      []YES  Preferred Language for Healthcare:   [x]English       []other:    Functional Scale:        Date assessed:  30 sec STS: raw score 5 reps                                           2020    Pain level:  5/10     SUBJECTIVE: Pt reports that she had a good weekend, had a zoom baby shower for her sister. Abdominal pain is okay today, still having the \"new\" abdominal pain that is wrapping around her L side.        OBJECTIVE:       RESTRICTIONS/PRECAUTIONS: lifting restriction of 8#, frail, J peg tubing     Exercises/Interventions:     Therapeutic Exercises (34114) Resistance / level Sets/sec Reps Notes   Nustep  Step one   bike L3   L1  Level 1  6 min       IB  HR/TR     LAQ  Seated march     [facility] level 0.5     Mat therex:  · Supine SLR  · SAQ  · Hip Add squeezes  · Hooklying clamshells   · Supine shoulder ER stretch         Supine with 2 pillows          Done after CT   Seated therex:  · Rows with TB loop  · B ER with TB loop    Seated on green SB:  Mid rows  LPD  High rows  Ant/post tilts  Lateral tilts  cw and ccw circles    First set with [place] TB   Standing therex:  · Hip 3 way  · FWD step ups  · Lateral step ups  · Ham curl  · Squats  · High knee march  · Hip abd   · Lateral band steps   May need orange TB at home   No UE assist, done in // bars   No UE assist, done in // bars  B UE support  B UE support  B UE support   B UE support, small distance to stay close to bag   Gliders - retro     Gliders - lateral     Free weights:  Bicep curl  Hammer curl  Palm down curl   Lat raises   OH press   Tricep kick back   3#  3#    3#  3#  3#   1  1  1  1  1   x15 B  x15 B    x10 B   x10 B  x15 B 9/9:  Done Standing    SB rolls on wall   SB rainbows on wall  Red  Red 1  1 x10  x10  Small lean fwd for incr stretch   Cables:  Mid rows   LPD  Hip ext   Hip abd   Hip flexion   3 pl  3 pl  1 pl  1 pl  1 pl   2  2  2  2  2   x10  x10  x10 B  x10 B  x10 B    Sidebending stretch with swiss ball  10 sec 1 B Discontinued - caused pressure in side of abdomen    Sidebending stretch - extension  10 sec 5                          Therapeutic Activities (57675)       Posture with 1/2 foam roll  · CT with scap squeeze  · B ER     Head position                            Neuromuscular Re-ed (39904)       Chin tucks in supine                                         Manual Intervention (04396)       cervical stretching into L SBing and L rotation, rotational mobs grade 1 to correct L upper t spine rotations, gentle cervical traction in neutral, DTM to L cervical paraspinals       Gentle cervical traction in neutral, STM to B UTs, cervical paraspinals and LS, cervical stretching into L SBing and L rotation, SOR,         Gentle cervical traction in neutral, STM to B UTs, manual stretching of UTs, LS, and full flexion with rotation, SOR PROM into SBing and rot, grade 2 PA mobs through c spine, DTM to R UT and scalenes, SOR, manual traction in neutral gently, side glides B grade 3    8/31: Increased tightness when SBing and rot to R   DTM to B UT, SOR, STM to cervical paraspinals, manual traction, PROM S + rot, OA nods, grade 2 PA mobs through mid to low C-spine        PROM into SBing and rot, grade 2 PA mobs through c spine,  manual traction in neutral gently   X 6 min         Pt. Education:  8/14: patient educated on diagnosis, prognosis and expectations for rehab, all patient questions were answered; stressed importance of taking breaks when needed and completing HEP each day in short bouts; encouraged pt to find head neutral every time she passes a mirror to avoid holding head towards the right     Home Exercise Program:  Access Code: [email or web address]   URL: [employer]/   Date: 09/14/2020   Prepared by: Shantell Hum   + lime TB  Exercises   Standing Bicep Curls Supinated with Dumbbells - 10 reps - 2 sets - 1x daily - 7x weekly   Standing Bicep Curls Neutral with Dumbbells - 10 reps - 2 sets - 1x daily - 7x weekly   Standing Pronated Elbow Flexion with Dumbbell - 10 reps - 2 sets - 1x daily - 7x weekly   Shoulder Abduction with Dumbbells - Palms Down - 10 reps - 2 sets - 1x daily - 7x weekly   Shoulder Overhead Press in Abduction with Dumbbells - 10 reps - 2 sets - 1x daily - 7x weekly   Standing Bent Over Triceps Extension - 10 reps - 2 sets - 1x daily - 7x weekly     Access Code: [email or web address]   URL: [employer]/   Date: 08/26/2020   Prepared by: Dipak Heal with Resistance - 10 reps - 1 sets - 2x daily - 7x weekly   Shoulder External Rotation with Resistance - 10 reps - 1 sets - 2x daily - 7x weekly   Standing Hip Flexion with Resistance Loop - 10 reps - 2 sets - 1x daily - 7x weekly   Hip Abduction with Resistance Loop - 10 reps - 2 sets - 1x daily - 7x weekly   Hip Extension with Resistance Loop - 10 reps - 2 sets - 1x daily - 7x weekly     Access Code: [email or web address]   URL: [employer]/   Date: 08/19/2020   Prepared by: Randee Irizarry with Resistance - 10 reps - 1 sets - 2x daily - 7x weekly   Shoulder External Rotation with Resistance - 10 reps - 1 sets - 2x daily - 7x weekly     Access Code: [email or web address]   URL: [email or web address]   Date: 08/14/2020   Prepared by: Shabana Meier     Exercises   Seated Long Arc Quad - 10 reps - 3 sets - 1x daily - 7x weekly   Seated March - 10 reps - 3 sets - 1x daily - 7x weekly   Supine Active Straight Leg Raise - 10 reps - 3 sets - 1x daily - 7x weekly     Therapeutic Exercise and NMR EXR  [x] (31658) Provided verbal/tactile cueing for activities related to strengthening, flexibility, endurance, ROM for improvements in  [x] LE / Lumbar: LE, proximal hip, and core control with self care, mobility, lifting, ambulation. [x] UE / Cervical: cervical, postural, scapular, scapulothoracic and UE control with self care, reaching, carrying, lifting, house/yardwork, driving, computer work.  [] (92023) Provided verbal/tactile cueing for activities related to improving balance, coordination, kinesthetic sense, posture, motor skill, proprioception to assist with   [] LE / lumbar: LE, proximal hip, and core control in self care, mobility, lifting, ambulation and eccentric single leg control. [] UE / cervical: cervical, scapular, scapulothoracic and UE control with self care, reaching, carrying, lifting, house/yardwork, driving, computer work.   [] (06269) Therapist is in constant attendance of 2 or more patients providing skilled therapy interventions, but not providing any significant amount of measurable one-on-one time to either patient, for improvements in  [] LE / lumbar: LE, proximal hip, and core control in self care, mobility, lifting, ambulation and eccentric single leg control.    [] UE / cervical: cervical, scapular, scapulothoracic and UE control with self care, reaching, carrying, lifting, house/yardwork, driving, computer work. NMR and Therapeutic Activities:    [] (49120 or 90200) Provided verbal/tactile cueing for activities related to improving balance, coordination, kinesthetic sense, posture, motor skill, proprioception and motor activation to allow for proper function of   [] LE: / Lumbar core, proximal hip and LE with self care and ADLs  [] UE / Cervical: cervical, postural, scapular, scapulothoracic and UE control with self care, carrying, lifting, driving, computer work.   [] (83521) Gait Re-education- Provided training and instruction to the patient for proper LE, core and proximal hip recruitment and positioning and eccentric body weight control with ambulation re-education including up and down stairs     Home Management Training / Self Care:  [] (06631) Provided self-care/home management training related to activities of daily living and compensatory training, and/or use of adaptive equipment for improvement with: ADLs and compensatory training, meal preparation, safety procedures and instruction in use of adaptive equipment, including bathing, grooming, dressing, personal hygiene, basic household cleaning and chores.      Home Exercise Program:    [] (55009) Reviewed/Progressed HEP activities related to strengthening, flexibility, endurance, ROM of   [] LE / Lumbar: core, proximal hip and LE for functional self-care, mobility, lifting and ambulation/stair navigation   [] UE / Cervical: cervical, postural, scapular, scapulothoracic and UE control with self care, reaching, carrying, lifting, house/yardwork, driving, computer work  [] (08369)Reviewed/Progressed HEP activities related to improving balance, coordination, kinesthetic sense, posture, motor skill, proprioception of   [] LE: core, proximal hip and LE for self care, mobility, lifting, and ambulation/stair navigation    [] UE / Cervical: cervical, postural,  scapular, scapulothoracic and UE control with self care, reaching, carrying, lifting, house/yardwork, driving, computer work    Manual Treatments:  PROM / STM / Oscillations-Mobs:  G-I, II, III, IV (PA's, Inf., Post.)  [x] (54183) Provided manual therapy to mobilize LE, proximal hip and/or LS spine soft tissue/joints for the purpose of modulating pain, promoting relaxation,  increasing ROM, reducing/eliminating soft tissue swelling/inflammation/restriction, improving soft tissue extensibility and allowing for proper ROM for normal function with   [] LE / lumbar: self care, mobility, lifting and ambulation. [x] UE / Cervical: self care, reaching, carrying, lifting, house/yardwork, driving, computer work. Modalities:  [] (12450) Vasopneumatic compression: Utilized vasopneumatic compression to decrease edema / swelling for the purpose of improving mobility and quad tone / recruitment which will allow for increased overall function including but not limited to self-care, transfers, ambulation, and ascending / descending stairs. Modalities:    9/3, 8/28, 9/1: MHP to cerv spine x 10 min in supine     Charges:  Timed Code Treatment Minutes: 48   Total Treatment Minutes: 48     [] EVAL - LOW (41548) x   [] EVAL - MOD (46099)  [] EVAL - HIGH (08826)  [] RE-EVAL (66565)  [x] LP(93956) x 3       [] Ionto  [] NMR (53023) x       [] Vaso  [] Manual (78370) x       [] Ultrasound  [] TA x        [] Mech Traction (34282)  [] Aquatic Therapy x     [] ES (un) (31056):   [] Home Management Training x  [] ES(attended) (18411)   [] Dry Needling 1-2 muscles (50035):  [] Dry Needling 3+ muscles (044451)  [] Group:      [] Other:     GOALS:  Patient stated goal: gain strength and endurance     Therapist goals for Patient:   Short Term Goals: To be achieved in: 2 weeks  1. Independent in HEP and progression per patient tolerance, in order to prevent re-injury.    2. Patient will have a decrease in limited by pain  [x] Patient limited by other medical complications  [] Other:     Prognosis: [] Good [x] Fair  [] Poor    Patient Requires Follow-up: [x] Yes  [] No    Plan for next treatment session:  Overall strength - UEs, LEs, abdominals as able, endurance training, posture training     PLAN: See eval. PT 2x / week for 6 weeks. [x] Continue per plan of care [] Alter current plan (see comments)  [] Plan of care initiated [] Hold pending MD visit [] Discharge    Electronically signed by: Dennie Kuba PT, DPT    Note: If patient does not return for scheduled/ recommended follow up visits, this note will serve as a discharge from care along with most recent update on progress.

## 2020-09-16 ENCOUNTER — HOSPITAL ENCOUNTER (OUTPATIENT)
Dept: PHYSICAL THERAPY | Age: 35
Setting detail: THERAPIES SERIES
Discharge: HOME | End: 2020-09-16
Payer: COMMERCIAL

## 2020-09-16 ENCOUNTER — HOSPITAL ENCOUNTER (OUTPATIENT)
Dept: OCCUPATIONAL THERAPY | Age: 35
Setting detail: THERAPIES SERIES
Discharge: HOME | End: 2020-09-16
Payer: COMMERCIAL

## 2020-09-16 PROCEDURE — 97530 THERAPEUTIC ACTIVITIES: CPT

## 2020-09-16 PROCEDURE — 97110 THERAPEUTIC EXERCISES: CPT

## 2020-09-16 PROCEDURE — 97140 MANUAL THERAPY 1/> REGIONS: CPT

## 2020-09-16 NOTE — FLOWSHEET NOTE
deconditioning  Treatment Diagnosis: decreased B UE and LE stretch, poor endurance, impaired posture, increased muscle tension through R cervicothoracic region, spinal malalignment  Insurance/Certification information:  PT Insurance Information: [facility] ($15 copay, 20 v per year no estim)  Physician Information:  Referring Practitioner: Dr. Patti Gordon of care signed (Y/N): []  Yes [x]  No     Date of Patient follow up with Physician: ?     Progress Report: [x]  Yes  []  No     Date Range for reporting period:  Beginnin2020  Endin/16    Progress report due (10 Rx/or 30 days whichever is less): visit #20      Recertification due (POC duration/ or 90 days whichever is less): 2020     Visit # Insurance Allowable Auth required? Date Range   10/12 20 []  Yes  [x]  No n/a     Latex Allergy:  [x]NO      []YES  Preferred Language for Healthcare:   [x]English       []other:    Functional Scale:        Date assessed:  30 sec STS: raw score 5 reps                                           2020    30 sec STS: raw score 9 reps                                           2020    Pain level:  5/10     SUBJECTIVE: Pt reports she can now go up steps with reciprocal pattern (for 1 flight). She reports improvement with stamina overall. She feels there is still room for improvement, has noticed about 50% so far.         OBJECTIVE:   :  Strength (0-5) Left Right   Shoulder flexion 4 4   Shoulder abd 4- 4*   Shoulder ER 4 4+   Shoulder IR 4 4        Seated hip flexion 4- 4-   Seated hip ER 4- 4-   Seated hip IR 4- 4-   Knee flexion 4 4   Knee ext 4 4   Ankle DF 4+ 4+            * = painful         Orthopaedic Special Tests  Positive  Negative  NT Comments    30 sec STS        9 reps   sahrmann level 0.5       Able to complete    sahrmann level 1a       Unable to keep back on table    6 MWT    1166 ft / 355 m       RESTRICTIONS/PRECAUTIONS: lifting restriction of 8#, frail, J peg tubing Exercises/Interventions:     Therapeutic Exercises (45410) Resistance / level Sets/sec Reps Notes   Nustep  Step one   bike L3   L1  Level 1  6 min       IB  HR/TR  30 sec  1 x2  x10    LAQ  Seated march     [facility]     Mat therex:  · Supine SLR  · SAQ  · Hip Add squeezes  · Hooklying clamshells   · Supine shoulder ER stretch         Supine with 2 pillows          Done after CT   Seated therex:  · Rows with TB loop  · B ER with TB loop    Seated on green SB:  Mid rows  LPD  High rows  Ant/post tilts  Lateral tilts  cw and ccw circles    First set with [place] TB   Standing therex:  · Hip 3 way  · FWD step ups  · Lateral step ups  · Ham curl  · Squats  · High knee march  · Hip abd   · Lateral band steps   May need orange TB at home   No UE assist, done in // bars   No UE assist, done in // bars  B UE support  B UE support  B UE support   B UE support, small distance to stay close to bag   Gliders - retro     Gliders - lateral     Free weights:  Bicep curl  Hammer curl  Palm down curl   Lat raises   OH press   Tricep kick back   3#  3#    3#  3#  3# 9/9:  Done Standing    SB rolls on wall   SB rainbows on wall  Red  Red Small lean fwd for incr stretch   Cables:  Mid rows   LPD  Hip ext   Hip abd   Hip flexion   3 pl  3 pl  1 pl  1 pl  1 pl    Sidebending stretch with swiss ball  Discontinued - caused pressure in side of abdomen    Swiss ball extension stretch                          Therapeutic Activities (00992)       Posture with 1/2 foam roll  · CT with scap squeeze  · B ER     Head position                            Neuromuscular Re-ed (77508)       Chin tucks in supine                                         Manual Intervention (10515)       cervical stretching into L SBing and L rotation, rotational mobs grade 1 to correct L upper t spine rotations, gentle cervical traction in neutral, DTM to L cervical paraspinals       Gentle cervical traction in neutral, STM to B UTs, cervical paraspinals and - 7x weekly   Standing Hip Flexion with Resistance Loop - 10 reps - 2 sets - 1x daily - 7x weekly   Hip Abduction with Resistance Loop - 10 reps - 2 sets - 1x daily - 7x weekly   Hip Extension with Resistance Loop - 10 reps - 2 sets - 1x daily - 7x weekly     Access Code: [email or web address]   URL: [email or web address]   Date: 08/19/2020   Prepared by: Siri Glasscock with Resistance - 10 reps - 1 sets - 2x daily - 7x weekly   Shoulder External Rotation with Resistance - 10 reps - 1 sets - 2x daily - 7x weekly     Access Code: [email or web address]   URL: [email or web address]   Date: 08/14/2020   Prepared by: Dimas Mooring     Exercises   Seated Long Arc Quad - 10 reps - 3 sets - 1x daily - 7x weekly   Seated March - 10 reps - 3 sets - 1x daily - 7x weekly   Supine Active Straight Leg Raise - 10 reps - 3 sets - 1x daily - 7x weekly     Therapeutic Exercise and NMR EXR  [x] (21898) Provided verbal/tactile cueing for activities related to strengthening, flexibility, endurance, ROM for improvements in  [x] LE / Lumbar: LE, proximal hip, and core control with self care, mobility, lifting, ambulation. [x] UE / Cervical: cervical, postural, scapular, scapulothoracic and UE control with self care, reaching, carrying, lifting, house/yardwork, driving, computer work.  [] (30266) Provided verbal/tactile cueing for activities related to improving balance, coordination, kinesthetic sense, posture, motor skill, proprioception to assist with   [] LE / lumbar: LE, proximal hip, and core control in self care, mobility, lifting, ambulation and eccentric single leg control.    [] UE / cervical: cervical, scapular, scapulothoracic and UE control with self care, reaching, carrying, lifting, house/yardwork, driving, computer work.   [] (07499) Therapist is in constant attendance of 2 or more patients providing skilled therapy interventions, but not providing any significant amount of measurable one-on-one time to either patient, for improvements in  [] LE / lumbar: LE, proximal hip, and core control in self care, mobility, lifting, ambulation and eccentric single leg control. [] UE / cervical: cervical, scapular, scapulothoracic and UE control with self care, reaching, carrying, lifting, house/yardwork, driving, computer work. NMR and Therapeutic Activities:    [] (33572 or 53432) Provided verbal/tactile cueing for activities related to improving balance, coordination, kinesthetic sense, posture, motor skill, proprioception and motor activation to allow for proper function of   [] LE: / Lumbar core, proximal hip and LE with self care and ADLs  [] UE / Cervical: cervical, postural, scapular, scapulothoracic and UE control with self care, carrying, lifting, driving, computer work.   [] (75019) Gait Re-education- Provided training and instruction to the patient for proper LE, core and proximal hip recruitment and positioning and eccentric body weight control with ambulation re-education including up and down stairs     Home Management Training / Self Care:  [] (04477) Provided self-care/home management training related to activities of daily living and compensatory training, and/or use of adaptive equipment for improvement with: ADLs and compensatory training, meal preparation, safety procedures and instruction in use of adaptive equipment, including bathing, grooming, dressing, personal hygiene, basic household cleaning and chores.      Home Exercise Program:    [] (51192) Reviewed/Progressed HEP activities related to strengthening, flexibility, endurance, ROM of   [] LE / Lumbar: core, proximal hip and LE for functional self-care, mobility, lifting and ambulation/stair navigation   [] UE / Cervical: cervical, postural, scapular, scapulothoracic and UE control with self care, reaching, carrying, lifting, house/yardwork, driving, computer work  [] (69400)Reviewed/Progressed HEP activities related to improving goals for Patient:   Short Term Goals: To be achieved in: 2 weeks  1. Independent in HEP and progression per patient tolerance, in order to prevent re-injury. 2. Patient will have a decrease in pain to facilitate improvement in movement, function, and ADLs as indicated by Functional Deficits. Long Term Goals: To be achieved in: 6 weeks  1. Pt will increase 30 sec STS reps to at least 8 to demo improvement in endurance. GOAL MET 9/16  2. Patient will increase strength in B UEs to at least 4+/5 and B LEs to at least 4/5 so patient can stand, walk, and complete ADLs independently. Progressing   3. Patient will demonstrate an increase in postural awareness and control to allow for proper functional mobility as indicated by patients Functional Deficits. Progressing   4. Patient will be able to amb for 300 feet or greater without sitting rest break to progress towards PLOF. GOAL MET 9/16  5. Patient will return to functional activities including cooking without increased symptoms or restriction. Not Assessed - Has not tried cooking  6. Patient will improve 6MWT distance to at least 430 m (1400 ft) to demo improved endurance for community ambulation. NEW GOAL Added 9/16     Overall Progression Towards Functional goals/ Treatment Progress Update:  [x] Patient is progressing as expected towards functional goals listed. [] Progression is slowed due to complexities/Impairments listed. [] Progression has been slowed due to co-morbidities.   [] Plan just implemented, too soon to assess goals progression <30days   [] Goals require adjustment due to lack of progress  [] Patient is not progressing as expected and requires additional follow up with physician  [] Other    Persisting Functional Limitations/Impairments:  []Sleeping []Sitting               [x]Standing []Transfers        [x]Walking [x]Kneeling               [x]Stairs [x]Squatting / bending   [x]ADLs [x]Reaching  [x]Lifting  [x]Housework  [x]Driving [x]Job

## 2020-09-21 ENCOUNTER — HOSPITAL ENCOUNTER (OUTPATIENT)
Dept: PHYSICAL THERAPY | Age: 35
Setting detail: THERAPIES SERIES
Discharge: HOME | End: 2020-09-21
Payer: COMMERCIAL

## 2020-09-21 ENCOUNTER — HOSPITAL ENCOUNTER (OUTPATIENT)
Dept: OCCUPATIONAL THERAPY | Age: 35
Setting detail: THERAPIES SERIES
Discharge: HOME | End: 2020-09-21
Payer: COMMERCIAL

## 2020-09-21 PROCEDURE — 97530 THERAPEUTIC ACTIVITIES: CPT

## 2020-09-21 PROCEDURE — 97112 NEUROMUSCULAR REEDUCATION: CPT

## 2020-09-21 PROCEDURE — 97110 THERAPEUTIC EXERCISES: CPT

## 2020-09-21 NOTE — PROGRESS NOTES
[address] [place] [address] Occupational Therapy     Occupational Therapy Re-Certification Plan of Care    Dear Dr. Radhika Davila,    We had the pleasure of treating the following patient for physical therapy services at [address] [place] [address]. A summary of our findings can be found in the updated assessment below. This includes our plan of care. If you have any questions or concerns regarding these findings, please do not hesitate to contact me at the office phone number checked above.   Thank you for the referral.     Physician Signature:________________________________Date:__________________  By signing above (or electronic signature), therapists plan is approved by physician      Functional Outcome: improved strength and endurance with increased independence in ADLs and IADLs    Overall Response to Treatment:   [x]Patient is responding well to treatment and improvement is noted with regards  to goals   []Patient should continue to improve in reasonable time if they continue HEP   []Patient has plateaued and is no longer responding to skilled PT intervention    []Patient is getting worse and would benefit from return to referring MD   []Patient unable to adhere to initial POC   []Other:     Date range of Visits: 20-20  Total Visits: 10    Recommendation:    [x]Continue OT 1x / wk for 8 weeks   []Hold PT, pending MD visit    Occupational Therapy Daily Treatment Note  Date:  2020    Patient: Dee Lucia   : 1985   MRN: [id number]  Referring Physician:  Dr. Felix Martinez Diagnosis Information:  Dx- physical deconditioning                                         Insurance information:  [facility]; ($15 copay, 20 v per year, no estim)  Date of Injury:  Date of Surgery:       Visit # Insurance Allowable Date Range   10/12 10/20      Date of Patient follow up with Physician:     Progress Note: [x]  Yes  []  No  Next due by: Visit #10      Latex Allergy: isolated finger extension using small rubber band for resistance. Pt participated in [facility] task utilizing in-hand manipulation to retrieve and place small pegs in holes with pt dropping 3/10 pegs on L side and 2/10 beads on R side. Session concluded with assessment of pt progress with progress noted in objective and subjective sections and in relation to goals. Therapeutic Exercises  Resistance / level Sets/sec Reps Notes               Pronation/supination with full grasp Red flexbar 2 5          Neuromuscular Re-ed / Therapeutic Activities                                                 Manual Intervention                                                     Patient education:  EVAL, POC, HEP, Role of OT, Goals- pt verbalized understanding, pt would benefit from visual aid of HEP in future session      Home Exercise Program:  Pt educated to complete the following bodyweight exercises to increase strength and endurance:  1) shoulder flexion  2) shoulder extension  3) elbow flexion   4) elbow extension  5) shoulder abduction  6) shoulder adduction  8/26/20: scapular retraction/protraction x10, 3-5x/day to increase scapular stability and improve posture  8/28/20: theraputty TE; 10 minutes max 2-4x/day  9/1/20: cat/cow and alternating UE reach with LE lift in quadruped      Therapeutic Exercise and NMR:  [x] (90451) Provided verbal/tactile cueing for activities related to strengthening, flexibility, endurance, ROM  for improvements in scapular, scapulothoracic and UE control with self care, reaching, carrying, lifting, house/yardwork, driving/computer work. [x] (89483) Provided verbal/tactile cueing for activities related to improving balance, coordination, kinesthetic sense, posture, motor skill, proprioception  to assist with  scapular, scapulothoracic and UE control with self care, reaching, carrying, lifting, house/yardwork, driving/computer work.   [] Comments:    Therapeutic Activities:    [x] (94814 or 25470) Provided verbal/tactile cueing for activities related to improving balance, coordination, kinesthetic sense, posture, motor skill, proprioception and motor activation to allow for proper function of scapular, scapulothoracic and UE control with self care, carrying, lifting, driving/computer work  [] Comments:    Home Exercise Program:    [] (30481) Reviewed/Progressed HEP activities related to strengthening, flexibility, endurance, ROM of scapular, scapulothoracic and UE control with self care, reaching, carrying, lifting, house/yardwork, driving/computer work  [] (09254) Reviewed/Progressed HEP activities related to improving balance, coordination, kinesthetic sense, posture, motor skill, proprioception of scapular, scapulothoracic and UE control with self care, reaching, carrying, lifting, house/yardwork, driving/computer work    [] Comments:    Manual Treatments:  PROM / STM / Oscillations-Mobs:  G-I, II, III, IV (PA's, Inf., Post.)  [] (76532) Provided manual therapy to mobilize soft tissue/joints of cervical/CT, scapular GHJ and UE for the purpose of modulating pain, promoting relaxation,  increasing ROM, reducing/eliminating soft tissue swelling/inflammation/restriction, improving soft tissue extensibility and allowing for proper ROM for normal function with self care, reaching, carrying, lifting, house/yardwork, driving/computer work  [] Comments:    ADL Training:  [] (99252) Provided self-care/home management training related to activities of daily living and compensatory training, and/or use of adaptive equipment   [] Comments:     Splinting:  [] Fabrication of:   [] (10446) Orthotic/Prosthetic Management, subsequent encounter  [] (17177) Orthotic management and training (fitting and assessment)  [] Comments:    Modalities:     Charges:  Timed Code Treatment Minutes: 45   Total Treatment Minutes: 45     [] EVAL (LOW) 39828   [] OT Re-eval (71668)  [] EVAL (MOD) 47252   [] EVAL (HIGH) 84102       [x] Mariam (28188) x   2  [] RZIEI(89066)  [] NMR (93312) x     [] Estim (attended) (18595)   [] Manual ([id number]) x     [] US (26199)  [x] TA (59470) x   1 [] Paraffin (52028)  [] ADL  (57910) x    [] Splint/L code:    [] Estim (unattended) ([id number])  [] Fluidotherapy (03754)  [] Other:     GOALS:  1) Patient stated goal: To gain strength and activity tolerance needed to return back home independently. []? Progressing: []? Met: []? Not Met: [x]? Adjusted   9/21/20: Pt has sold her home to live with her parents indefinitely due to continued medical issues and nutrition- goal ceased at this time. 2) Patient stated goal: To complete bathing in stance with no use of shower chair  [x]? Progressing: []? Met: []? Not Met: []? Adjusted   9/21/20: near complete; pt required shower chair for final few minutes of most recent shower     Therapist goals for Patient:   Short Term Goals: To be achieved in: 30 days  1. Pt will increase  strength in B hands by 10 lbs by 9/14 to increase functional capacity for ADL/IADL tasks  [x]? Progressing: []? Met: []? Not Met: []? Adjusted  2. Pt will demo increased activity tolerance and safety needed to complete cooking task at independent level by 9/14  [x]? Progressing: []? Met: []? Not Met: []? Adjusted  3. Pt will demo increase shoulder strength as evidence by ability to complete cleaning task with use of broom or vacuum by 9/14  [x]? Progressing: []? Met: []? Not Met: []? Adjusted     Long Term Goals: To be achieved by polly  1. Pt will will report a QuickDASH Symptom Severity Scale score of 30% or less indicating increased safety and functional independence in desired occupational pursuits by discharge. [x]? Progressing: []? Met: []? Not Met: []? Adjusted    Progression Towards Functional goals:  [x] Patient is progressing as expected towards functional goals listed. [] Progression is slowed due to complexities listed. [] Progression has been slowed due to co-morbidities.   [] Plan just implemented, too soon to assess goals progression  [] All goals are met  [] Other:     ASSESSMENT:  See eval    Treatment/Activity Tolerance:  [x] Patient tolerated treatment well [] Patient limited by fatique  [] Patient limited by pain  [] Patient limited by other medical complications  [] Other:     Prognosis: [x] Good [] Fair  [] Poor    Patient Requires Follow-up: [x] Yes  [] No    PLAN: See eval  [x] Continue per plan of care [] Alter current plan (see comments)  [] Plan of care initiated [] Hold pending MD visit [] Discharge    Electronically signed by: Chase Odonnell OTR/L [id number]      Note: If patient does not return for scheduled/ recommended follow up visits, this note will serve as a discharge from care along with most recent update on progress.

## 2020-09-21 NOTE — FLOWSHEET NOTE
[address] [facility] Physical Therapy  Phone: [phone]   Fax: [phone]    Physical Therapy Daily Treatment Note  Date:  2020    Patient Name:  Burak Pepe    :  1985  MRN: [id number]  Medical/Treatment Diagnosis Information:  · Diagnosis: physical deconditioning  Treatment Diagnosis: decreased B UE and LE stretch, poor endurance, impaired posture, increased muscle tension through R cervicothoracic region, spinal malalignment  Insurance/Certification information:  PT Insurance Information: [facility] ($15 copay, 20 v per year no estim)  Physician Information:  Referring Practitioner:  - Pt provided information for surgeon who is overseeing all care now:  Dr. Roy Counts  [address] [facility]  Phone: [phone]  Fax: [phone]  Plan of care signed (Y/N): []  Yes [x]  No     Date of Patient follow up with Physician: ?     Progress Report: []  Yes  [x]  No     Date Range for reporting period:  Beginnin2020  Endin/16    Progress report due (10 Rx/or 30 days whichever is less): visit #46      Recertification due (POC duration/ or 90 days whichever is less): 2020     Visit # Insurance Allowable Auth required? Date Range   20 []  Yes  [x]  No n/a     Latex Allergy:  [x]NO      []YES  Preferred Language for Healthcare:   [x]English       []other:    Functional Scale:        Date assessed:  30 sec STS: raw score 5 reps                                           2020    30 sec STS: raw score 9 reps                                           2020    Pain level:  5/10     SUBJECTIVE: Pt reports she was almost able to stand for an entire shower this weekend. Pain is about the same, she did take some medicine before she came.       OBJECTIVE:   :  Strength (0-5) Left Right   Shoulder flexion 4 4   Shoulder abd 4- 4*   Shoulder ER 4 4+   Shoulder IR 4 4        Seated hip flexion 4- 4-   Seated hip ER 4- 4-   Seated hip IR 4- 4-   Knee flexion 4 4   Knee ext 4 4   Ankle DF 4+ 4+            * = painful         Orthopaedic Special Tests  Positive  Negative  NT Comments    30 sec STS        9 reps   sahrmann level 0.5       Able to complete    sahrmann level 1a       Unable to keep back on table    6 MWT    1166 ft / 36 m       RESTRICTIONS/PRECAUTIONS: lifting restriction of 8#, frail, J peg tubing     Exercises/Interventions:     Therapeutic Exercises (29727) Resistance / level Sets/sec Reps Notes   Nustep  Step one   bike L3   L1  Level 1    X 5 min     IB  HR/TR  30 sec  1 x2  x10    LAQ  Seated march     [facility] level 0.5     Mat therex:  · Supine SLR  · SAQ  · Hip Add squeezes  · Hooklying clamshells   · Supine shoulder ER stretch         Supine with 2 pillows          Done after CT   Seated therex:  · Rows with TB loop  · B ER with TB loop    Seated on green SB:  Mid rows  LPD  High rows  Ant/post tilts  Lateral tilts  cw and ccw circles  1  1  1x20  x20  x20  First set with [place] TB   Standing therex:  · Hip 3 way  · FWD step ups  · Lateral step ups  · Ham curl  · Squats  · High knee march  · Hip abd   · Lateral band steps   May need orange TB at home   No UE assist, done in // bars   No UE assist, done in // bars  B UE support  B UE support  B UE support   B UE support, small distance to stay close to bag   Gliders - retro     Gliders - lateral     Free weights:  Bicep curl  Hammer curl  Palm down curl   Lat raises   OH press   Tricep kick back   3#  3#  3#  3#  3#  3# 1  1  11x15 B  x15 B  x15 Bx15 B9/9:  Done Standing    SB rolls on wall   SB rainbows on wall  Red  Red 1  1 x10  x10  Small lean fwd for incr stretch   Cables:  Mid rows   LPD  Hip ext   Hip abd   Hip flexion   3 pl  3 pl  1 pl  1 pl  1 pl    Sidebending stretch with swiss ball  Discontinued - caused pressure in side of abdomen    Swiss ball extension stretch                          Therapeutic Activities (15613)       Posture with 1/2 foam roll  · CT with scap squeeze  · B ER     Head position                            Neuromuscular Re-ed (25889)       Chin tucks in supine      Weighted walkouts with waist strap 1.5pl 1 5 ea Fwd, retro, R, L   Tiltboard taps - A/P, M/L  1 20 ea    Tiltboard DLS - A/P, M/L  60 sec ea     Bosu lunges on blue side   10 B Light fingertips for balance    Bosu - black side  · Mini squats  · Moguls       1  1   20  20                                Manual Intervention (64418)       cervical stretching into L SBing and L rotation, rotational mobs grade 1 to correct L upper t spine rotations, gentle cervical traction in neutral, DTM to L cervical paraspinals       Gentle cervical traction in neutral, STM to B UTs, cervical paraspinals and LS, cervical stretching into L SBing and L rotation, SOR,         Gentle cervical traction in neutral, STM to B UTs, manual stretching of UTs, LS, and full flexion with rotation, SOR       PROM into SBing and rot, grade 2 PA mobs through c spine, DTM to R UT and scalenes, SOR, manual traction in neutral gently, side glides B grade 3    8/31: Increased tightness when SBing and rot to R   DTM to B UT, SOR, STM to cervical paraspinals, manual traction, PROM S + rot, OA nods, grade 2 PA mobs through mid to low C-spine        PROM into SBing and rot in neutral and flexion,   manual traction in neutral gently, DTM to B UT and LS             Pt. Education:  8/14: patient educated on diagnosis, prognosis and expectations for rehab, all patient questions were answered; stressed importance of taking breaks when needed and completing HEP each day in short bouts; encouraged pt to find head neutral every time she passes a mirror to avoid holding head towards the right     Home Exercise Program:  Access Code: [email or web address]   URL: [email or web address]   Date: 09/14/2020   Prepared by: Yosi Roger   + lime TB  Exercises   Standing Bicep Curls Supinated with Dumbbells - 10 reps - 2 sets - 1x daily - 7x weekly   Standing Bicep Curls Neutral with Dumbbells - 10 reps - 2 sets - 1x daily - 7x weekly   Standing Pronated Elbow Flexion with Dumbbell - 10 reps - 2 sets - 1x daily - 7x weekly   Shoulder Abduction with Dumbbells - Palms Down - 10 reps - 2 sets - 1x daily - 7x weekly   Shoulder Overhead Press in Abduction with Dumbbells - 10 reps - 2 sets - 1x daily - 7x weekly   Standing Bent Over Triceps Extension - 10 reps - 2 sets - 1x daily - 7x weekly     Access Code: [email or web address]   URL: GetThis/   Date: 08/26/2020   Prepared by: Rocaele Needles with Resistance - 10 reps - 1 sets - 2x daily - 7x weekly   Shoulder External Rotation with Resistance - 10 reps - 1 sets - 2x daily - 7x weekly   Standing Hip Flexion with Resistance Loop - 10 reps - 2 sets - 1x daily - 7x weekly   Hip Abduction with Resistance Loop - 10 reps - 2 sets - 1x daily - 7x weekly   Hip Extension with Resistance Loop - 10 reps - 2 sets - 1x daily - 7x weekly     Access Code: [email or web address]   URL: GetThis/   Date: 08/19/2020   Prepared by: Maritza Needles with Resistance - 10 reps - 1 sets - 2x daily - 7x weekly   Shoulder External Rotation with Resistance - 10 reps - 1 sets - 2x daily - 7x weekly     Access Code: [email or web address]   URL: GetThis/   Date: 08/14/2020   Prepared by: Tay Camara     Exercises   Seated Long Arc Quad - 10 reps - 3 sets - 1x daily - 7x weekly   Seated March - 10 reps - 3 sets - 1x daily - 7x weekly   Supine Active Straight Leg Raise - 10 reps - 3 sets - 1x daily - 7x weekly     Therapeutic Exercise and NMR EXR  [x] (19461) Provided verbal/tactile cueing for activities related to strengthening, flexibility, endurance, ROM for improvements in  [x] LE / Lumbar: LE, proximal hip, and core control with self care, mobility, lifting, ambulation.   [x] UE / Cervical: cervical, postural, scapular, scapulothoracic and UE control with self care, reaching, carrying, lifting, house/yardwork, driving, computer work.  [] (53738) Provided verbal/tactile cueing for activities related to improving balance, coordination, kinesthetic sense, posture, motor skill, proprioception to assist with   [] LE / lumbar: LE, proximal hip, and core control in self care, mobility, lifting, ambulation and eccentric single leg control. [] UE / cervical: cervical, scapular, scapulothoracic and UE control with self care, reaching, carrying, lifting, house/yardwork, driving, computer work.   [] (25242) Therapist is in constant attendance of 2 or more patients providing skilled therapy interventions, but not providing any significant amount of measurable one-on-one time to either patient, for improvements in  [] LE / lumbar: LE, proximal hip, and core control in self care, mobility, lifting, ambulation and eccentric single leg control. [] UE / cervical: cervical, scapular, scapulothoracic and UE control with self care, reaching, carrying, lifting, house/yardwork, driving, computer work.      NMR and Therapeutic Activities:    [x] (83941 or 71843) Provided verbal/tactile cueing for activities related to improving balance, coordination, kinesthetic sense, posture, motor skill, proprioception and motor activation to allow for proper function of   [x] LE: / Lumbar core, proximal hip and LE with self care and ADLs  [] UE / Cervical: cervical, postural, scapular, scapulothoracic and UE control with self care, carrying, lifting, driving, computer work.   [] (70644) Gait Re-education- Provided training and instruction to the patient for proper LE, core and proximal hip recruitment and positioning and eccentric body weight control with ambulation re-education including up and down stairs     Home Management Training / Self Care:  [] (23392) Provided self-care/home management training related to activities of daily living and compensatory training, and/or use of adaptive equipment for improvement with: ADLs and compensatory training, meal preparation, safety procedures and instruction in use of adaptive equipment, including bathing, grooming, dressing, personal hygiene, basic household cleaning and chores. Home Exercise Program:    [] (33362) Reviewed/Progressed HEP activities related to strengthening, flexibility, endurance, ROM of   [] LE / Lumbar: core, proximal hip and LE for functional self-care, mobility, lifting and ambulation/stair navigation   [] UE / Cervical: cervical, postural, scapular, scapulothoracic and UE control with self care, reaching, carrying, lifting, house/yardwork, driving, computer work  [] (61915)Reviewed/Progressed HEP activities related to improving balance, coordination, kinesthetic sense, posture, motor skill, proprioception of   [] LE: core, proximal hip and LE for self care, mobility, lifting, and ambulation/stair navigation    [] UE / Cervical: cervical, postural,  scapular, scapulothoracic and UE control with self care, reaching, carrying, lifting, house/yardwork, driving, computer work    Manual Treatments:  PROM / STM / Oscillations-Mobs:  G-I, II, III, IV (PA's, Inf., Post.)  [] (24727) Provided manual therapy to mobilize LE, proximal hip and/or LS spine soft tissue/joints for the purpose of modulating pain, promoting relaxation,  increasing ROM, reducing/eliminating soft tissue swelling/inflammation/restriction, improving soft tissue extensibility and allowing for proper ROM for normal function with   [] LE / lumbar: self care, mobility, lifting and ambulation. [] UE / Cervical: self care, reaching, carrying, lifting, house/yardwork, driving, computer work.      Modalities:  [] (42985) Vasopneumatic compression: Utilized vasopneumatic compression to decrease edema / swelling for the purpose of improving mobility and quad tone / recruitment which will allow for increased overall function including but not limited to self-care, transfers, ambulation, and ascending / descending stairs. Modalities:    9/3, 8/28, 9/1: MHP to cerv spine x 10 min in supine     Charges:  Timed Code Treatment Minutes: 43   Total Treatment Minutes: 43     [] EVAL - LOW (39220) x   [] EVAL - MOD (63391)  [] EVAL - HIGH (97849)  [] RE-EVAL (93655)  [x] TZ(85758) x 2       [] Ionto  [x] NMR (44124) x 1      [] Vaso  [] Manual (05040) x        [] Ultrasound  [] TA x        [] Mech Traction (84648)  [] Aquatic Therapy x     [] ES (un) (99473):   [] Home Management Training x  [] ES(attended) (94631)   [] Dry Needling 1-2 muscles (54502):  [] Dry Needling 3+ muscles (546030)  [] Group:      [] Other:     GOALS:  Patient stated goal: gain strength and endurance     Therapist goals for Patient:   Short Term Goals: To be achieved in: 2 weeks  1. Independent in HEP and progression per patient tolerance, in order to prevent re-injury. 2. Patient will have a decrease in pain to facilitate improvement in movement, function, and ADLs as indicated by Functional Deficits. Long Term Goals: To be achieved in: 6 weeks  1. Pt will increase 30 sec STS reps to at least 8 to demo improvement in endurance. GOAL MET 9/16  2. Patient will increase strength in B UEs to at least 4+/5 and B LEs to at least 4/5 so patient can stand, walk, and complete ADLs independently. Progressing   3. Patient will demonstrate an increase in postural awareness and control to allow for proper functional mobility as indicated by patients Functional Deficits. Progressing   4. Patient will be able to amb for 300 feet or greater without sitting rest break to progress towards PLOF. GOAL MET 9/16  5. Patient will return to functional activities including cooking without increased symptoms or restriction. Not Assessed - Has not tried cooking  6. Patient will improve 6MWT distance to at least 430 m (1400 ft) to demo improved endurance for community ambulation.   NEW GOAL Added

## 2020-09-23 ENCOUNTER — HOSPITAL ENCOUNTER (OUTPATIENT)
Dept: OCCUPATIONAL THERAPY | Age: 35
Setting detail: THERAPIES SERIES
Discharge: HOME | End: 2020-09-23
Payer: COMMERCIAL

## 2020-09-23 ENCOUNTER — HOSPITAL ENCOUNTER (OUTPATIENT)
Dept: PHYSICAL THERAPY | Age: 35
Setting detail: THERAPIES SERIES
Discharge: HOME | End: 2020-09-23
Payer: COMMERCIAL

## 2020-09-23 PROCEDURE — 97530 THERAPEUTIC ACTIVITIES: CPT

## 2020-09-23 PROCEDURE — 97140 MANUAL THERAPY 1/> REGIONS: CPT

## 2020-09-23 PROCEDURE — 97110 THERAPEUTIC EXERCISES: CPT

## 2020-09-23 NOTE — FLOWSHEET NOTE
[address] [facility]  Phone: [phone]   Fax: [phone]    Physical Therapy Daily Treatment Note  Date:  2020    Patient Name:  Jin Snow    :  1985  MRN: [id number]  Medical/Treatment Diagnosis Information:  · Diagnosis: physical deconditioning  Treatment Diagnosis: decreased B UE and LE stretch, poor endurance, impaired posture, increased muscle tension through R cervicothoracic region, spinal malalignment  Insurance/Certification information:  PT Insurance Information: [facility] ($15 copay, 20 v per year no estim)  Physician Information:  Referring Practitioner:  - Pt provided information for surgeon who is overseeing all care now:  Dr. Randi Parnell  [address] [facility]  Phone: [phone]  Fax: [phone]  Plan of care signed (Y/N): []  Yes [x]  No     Date of Patient follow up with Physician: ?     Progress Report: []  Yes  [x]  No     Date Range for reporting period:  Beginnin2020  Endin/16    Progress report due (10 Rx/or 30 days whichever is less): visit #66      Recertification due (POC duration/ or 90 days whichever is less): 2020     Visit # Insurance Allowable Auth required? Date Range     +  20 []  Yes  [x]  No n/a     Latex Allergy:  [x]NO      []YES  Preferred Language for Healthcare:   [x]English       []other:    Functional Scale:        Date assessed:  30 sec STS: raw score 5 reps                                           2020    30 sec STS: raw score 9 reps                                           2020    Pain level:  5/10     SUBJECTIVE: Pt reports she is doing okay today.       OBJECTIVE:   :  Strength (0-5) Left Right   Shoulder flexion 4 4   Shoulder abd 4- 4*   Shoulder ER 4 4+   Shoulder IR 4 4        Seated hip flexion 4- 4-   Seated hip ER 4- 4-   Seated hip IR 4- 4-   Knee flexion 4 4   Knee ext 4 4   Ankle DF 4+ 4+            * = foam roll  · CT with scap squeeze  · B ER     Head position                            Neuromuscular Re-ed (80598)       Chin tucks in supine      Weighted walkouts with waist strap 1.5pl Fwd, retro, R, L   Tiltboard taps - A/P, M/L     Tiltboard DLS - A/P, M/L     Bosu lunges on blue side  Light fingertips for balance    Bosu - black side  · Mini squats  · Moguls        CC paloff press 1.5pl 1 5 R/L                         Manual Intervention (81494)       cervical stretching into L SBing and L rotation, rotational mobs grade 1 to correct L upper t spine rotations, gentle cervical traction in neutral, DTM to L cervical paraspinals       Gentle cervical traction in neutral, STM to B UTs, cervical paraspinals and LS, cervical stretching into L SBing and L rotation, SOR,         Gentle cervical traction in neutral, STM to B UTs, manual stretching of UTs, LS, and full flexion with rotation, SOR       PROM into SBing and rot, grade 2 PA mobs through c spine, DTM to R UT and scalenes, SOR, manual traction in neutral gently, side glides B grade 3    8/31: Increased tightness when SBing and rot to R   DTM to B UT, SOR, STM to cervical paraspinals, manual traction, PROM S + rot, OA nods, grade 2 PA mobs through mid to low C-spine        PROM into SBing and rot in neutral and flexion,   manual traction in neutral gently, DTM to B UT and LS             Pt. Education:  8/14: patient educated on diagnosis, prognosis and expectations for rehab, all patient questions were answered; stressed importance of taking breaks when needed and completing HEP each day in short bouts; encouraged pt to find head neutral every time she passes a mirror to avoid holding head towards the right     Home Exercise Program:  Access Code: [email or web address]   URL: [email or web address]   Date: 09/23/2020   Prepared by: Shantell Medrano     Exercises   Supine 90/90 Alternating Toe Touch - 10 reps - 3 sets - 1x daily - 7x weekly   Modified Supine 90/90 Leg Extensions - 10 reps - 3 sets - 1x daily - 7x weekly     Access Code: [email or web address]   URL: [email or web address]   Date: 09/14/2020   Prepared by: Liz Body   + lime TB  Exercises   Standing Bicep Curls Supinated with Dumbbells - 10 reps - 2 sets - 1x daily - 7x weekly   Standing Bicep Curls Neutral with Dumbbells - 10 reps - 2 sets - 1x daily - 7x weekly   Standing Pronated Elbow Flexion with Dumbbell - 10 reps - 2 sets - 1x daily - 7x weekly   Shoulder Abduction with Dumbbells - Palms Down - 10 reps - 2 sets - 1x daily - 7x weekly   Shoulder Overhead Press in Abduction with Dumbbells - 10 reps - 2 sets - 1x daily - 7x weekly   Standing Bent Over Triceps Extension - 10 reps - 2 sets - 1x daily - 7x weekly     Access Code: [email or web address]   URL: [email or web address]   Date: 08/26/2020   Prepared by: Celina Lucia with Resistance - 10 reps - 1 sets - 2x daily - 7x weekly   Shoulder External Rotation with Resistance - 10 reps - 1 sets - 2x daily - 7x weekly   Standing Hip Flexion with Resistance Loop - 10 reps - 2 sets - 1x daily - 7x weekly   Hip Abduction with Resistance Loop - 10 reps - 2 sets - 1x daily - 7x weekly   Hip Extension with Resistance Loop - 10 reps - 2 sets - 1x daily - 7x weekly     Access Code: [email or web address]   URL: [email or web address]/   Date: 08/19/2020   Prepared by: Jakion Khari with Resistance - 10 reps - 1 sets - 2x daily - 7x weekly   Shoulder External Rotation with Resistance - 10 reps - 1 sets - 2x daily - 7x weekly     Access Code: [email or web address]   URL: [email or web address]/   Date: 08/14/2020   Prepared by: Ej Bulgarian     Exercises   Seated Long Arc Quad - 10 reps - 3 sets - 1x daily - 7x weekly   Seated March - 10 reps - 3 sets - 1x daily - 7x weekly   Supine Active Straight Leg Raise - 10 reps - 3 sets - 1x daily - 7x weekly     Therapeutic Exercise and NMR EXR  [x] (56379) Provided verbal/tactile cueing for activities related to strengthening, flexibility, endurance, ROM for improvements in  [x] LE / Lumbar: LE, proximal hip, and core control with self care, mobility, lifting, ambulation. [x] UE / Cervical: cervical, postural, scapular, scapulothoracic and UE control with self care, reaching, carrying, lifting, house/yardwork, driving, computer work.  [] (43000) Provided verbal/tactile cueing for activities related to improving balance, coordination, kinesthetic sense, posture, motor skill, proprioception to assist with   [] LE / lumbar: LE, proximal hip, and core control in self care, mobility, lifting, ambulation and eccentric single leg control. [] UE / cervical: cervical, scapular, scapulothoracic and UE control with self care, reaching, carrying, lifting, house/yardwork, driving, computer work.   [] (64953) Therapist is in constant attendance of 2 or more patients providing skilled therapy interventions, but not providing any significant amount of measurable one-on-one time to either patient, for improvements in  [] LE / lumbar: LE, proximal hip, and core control in self care, mobility, lifting, ambulation and eccentric single leg control. [] UE / cervical: cervical, scapular, scapulothoracic and UE control with self care, reaching, carrying, lifting, house/yardwork, driving, computer work.      NMR and Therapeutic Activities:    [x] (57553 or 41417) Provided verbal/tactile cueing for activities related to improving balance, coordination, kinesthetic sense, posture, motor skill, proprioception and motor activation to allow for proper function of   [x] LE: / Lumbar core, proximal hip and LE with self care and ADLs  [] UE / Cervical: cervical, postural, scapular, scapulothoracic and UE control with self care, carrying, lifting, driving, computer work.   [] (77782) Gait Re-education- Provided training and instruction to the patient for proper LE, core and proximal hip recruitment and positioning and Modalities:  [] (60415) Vasopneumatic compression: Utilized vasopneumatic compression to decrease edema / swelling for the purpose of improving mobility and quad tone / recruitment which will allow for increased overall function including but not limited to self-care, transfers, ambulation, and ascending / descending stairs. Modalities:    9/3, 8/28, 9/1: MHP to cerv spine x 10 min in supine   9/23: MHP to B shoulders and cervical spine in supine x 15 min (between PT and OT sessions)    Charges:  Timed Code Treatment Minutes: 40   Total Treatment Minutes: 55     [] EVAL - LOW (25215) x   [] EVAL - MOD (88791)  [] EVAL - HIGH (05605)  [] RE-EVAL (69043)  [x] UK(33786) x 3       [] Ionto  [] NMR (97339) x       [] Vaso  [] Manual (35535) x        [] Ultrasound  [] TA x        [] Mech Traction (99103)  [] Aquatic Therapy x     [] ES (un) (67081):   [] Home Management Training x  [] ES(attended) (24335)   [] Dry Needling 1-2 muscles (99811):  [] Dry Needling 3+ muscles (935941)  [] Group:      [] Other:     GOALS:  Patient stated goal: gain strength and endurance     Therapist goals for Patient:   Short Term Goals: To be achieved in: 2 weeks  1. Independent in HEP and progression per patient tolerance, in order to prevent re-injury. 2. Patient will have a decrease in pain to facilitate improvement in movement, function, and ADLs as indicated by Functional Deficits. Long Term Goals: To be achieved in: 6 weeks  1. Pt will increase 30 sec STS reps to at least 8 to demo improvement in endurance. GOAL MET 9/16  2. Patient will increase strength in B UEs to at least 4+/5 and B LEs to at least 4/5 so patient can stand, walk, and complete ADLs independently. Progressing   3. Patient will demonstrate an increase in postural awareness and control to allow for proper functional mobility as indicated by patients Functional Deficits. Progressing   4.  Patient will be able to amb for 300 feet or greater without sitting rest break to progress towards PLOF. GOAL MET 9/16  5. Patient will return to functional activities including cooking without increased symptoms or restriction. Not Assessed - Has not tried cooking  6. Patient will improve 6MWT distance to at least 430 m (1400 ft) to demo improved endurance for community ambulation. NEW GOAL Added 9/16     Overall Progression Towards Functional goals/ Treatment Progress Update:  [x] Patient is progressing as expected towards functional goals listed. [] Progression is slowed due to complexities/Impairments listed. [] Progression has been slowed due to co-morbidities. [] Plan just implemented, too soon to assess goals progression <30days   [] Goals require adjustment due to lack of progress  [] Patient is not progressing as expected and requires additional follow up with physician  [] Other    Persisting Functional Limitations/Impairments:  []Sleeping []Sitting               [x]Standing []Transfers        [x]Walking [x]Kneeling               [x]Stairs [x]Squatting / bending   [x]ADLs [x]Reaching  [x]Lifting  [x]Housework  [x]Driving [x]Job related tasks  []Sports/Recreation []Other:        ASSESSMENT: Increased core strengthening activities with good tolerance. Pt will need continued practice with spine neutral exercises for core strength. Treatment/Activity Tolerance:  [] Patient able to complete tx  [x] Patient limited by fatigue  [] Patient limited by pain  [x] Patient limited by other medical complications  [] Other:     Prognosis: [] Good [x] Fair  [] Poor    Patient Requires Follow-up: [x] Yes  [] No    Plan for next treatment session:  Overall strength - UEs, LEs, abdominals as able, endurance training, posture training     PLAN: See eval. PT 2x / week for 6 weeks.    [x] Continue per plan of care [] Alter current plan (see comments)  [] Plan of care initiated [] Hold pending MD visit [] Discharge    Electronically signed by: Dereck Suh PT, DPT    Note: If patient does not return for scheduled/ recommended follow up visits, this note will serve as a discharge from care along with most recent update on progress.

## 2020-09-23 NOTE — FLOWSHEET NOTE
[facility] - Outpatient Occupational Therapy    Occupational Therapy Daily Treatment Note  Date:  2020    Patient: Cortney Burr   : 1985   MRN: [id number]  Referring Physician:  Dr. Maylin Lerner Diagnosis Information:  Dx- physical deconditioning                                         Insurance information:  [facility]; ($15 copay, 20 v per year, no estim)  Date of Injury:  Date of Surgery:       Visit # Insurance Allowable Date Range    + 0/8        Date of Patient follow up with Physician:     Progress Note: []  Yes  [x]  No  Next due by: Visit #20      Latex Allergy:  [x]No      []Yes    Pacemaker:  [x] No       [] Yes     Preferred Language for Healthcare:   [x]English       []other:    Pain level:  5/10 constant abdominal pain    SUBJECTIVE:  Pt reports she saw her liver doctor yesterday and he reported she looks \"much stronger. \" Pt also states she had trouble sleeping last night due to tension in her neck. Functional Disability Index: Quick DASH: raw score = ; dysfunction = 43%    OBJECTIVE: See eval    Hand Assessment Left  Right     Strength (lbs) 21  : 29 26  : 31   Lateral Pinch Strength (lbs) 6  : 11 8  : 11   3 Jaw Nicholas Pinch Strength (lbs) 6  : 8 9  : 11   Tip Pinch Strength (lbs) 5  : 6.5 7  : 9   Opposition (Y/N) Y Y       RESTRICTIONS/PRECAUTIONS: lifting restriction of 8#, frail, J peg tubing      Exercises/Interventions: Treatment focused on increasing strength, endurance, and fine motor strength/control to enhance activity tolerance in ADLs/IADLs. Session initiated with removal of moist heat from neck and shoulders placed by PT. Noted increased muscle tightness throughout mid and upper trapezius. Pt assumed prone position to receive STM to area.  When asked, pt reports she occasionally gets headaches that feel like a rubber band around the base of her skull with increased STM and myofascial release near insertion at external occipital protuberance. Pt reported decreased tightness following. Pt instructed in upright posture with shoulder depression throughout this week to prevent return of increased tone with pt verbalizing understanding. Sleep set up discussed with pt reporting she uses one medium-firm pillow and primarily side sleeps. Pt completed functional mobility to gym area with pt ascending 12 stairs without a rest break on this date. Pt completed 4 minute warm up on rowing machine with increased speed and thoracic extension on this date. Pt then completed ~20 ft empty sled push/pull with emphasis on trunk control and gross motor coordination and strengthening. Pt completed 6 reps on back extension machine with 50# resistance with emphasis on eccentric trunk control and improved extension met against resistance. Session concluded with fine motor task for lateral pinch for functional strengthening in opening bottles and containers, as well as in-hand manipulation for increased coordination with pt dropping 1/14 pieces. Pt also completed medical release form on this date to be scanned in later.         Therapeutic Exercises  Resistance / level Sets/sec Reps Notes               Pronation/supination with full grasp Red flexbar 2 5          Neuromuscular Re-ed / Therapeutic Activities                                                 Manual Intervention                                                     Patient education:  EVAL, POC, HEP, Role of OT, Goals- pt verbalized understanding, pt would benefit from visual aid of HEP in future session      Home Exercise Program:  Pt educated to complete the following bodyweight exercises to increase strength and endurance:  1) shoulder flexion  2) shoulder extension  3) elbow flexion   4) elbow extension  5) shoulder abduction  6) shoulder adduction  8/26/20: scapular retraction/protraction x10, 3-5x/day to increase scapular stability and improve posture  8/28/20: theraputty TE; 10 minutes max 2-4x/day  9/1/20: cat/cow and alternating UE reach with LE lift in quadruped      Therapeutic Exercise and NMR:  [x] (72411) Provided verbal/tactile cueing for activities related to strengthening, flexibility, endurance, ROM  for improvements in scapular, scapulothoracic and UE control with self care, reaching, carrying, lifting, house/yardwork, driving/computer work. [x] (78700) Provided verbal/tactile cueing for activities related to improving balance, coordination, kinesthetic sense, posture, motor skill, proprioception  to assist with  scapular, scapulothoracic and UE control with self care, reaching, carrying, lifting, house/yardwork, driving/computer work.   [] Comments:    Therapeutic Activities:    [x] (62188 or 04361) Provided verbal/tactile cueing for activities related to improving balance, coordination, kinesthetic sense, posture, motor skill, proprioception and motor activation to allow for proper function of scapular, scapulothoracic and UE control with self care, carrying, lifting, driving/computer work  [] Comments:    Home Exercise Program:    [] (09667) Reviewed/Progressed HEP activities related to strengthening, flexibility, endurance, ROM of scapular, scapulothoracic and UE control with self care, reaching, carrying, lifting, house/yardwork, driving/computer work  [] (31630) Reviewed/Progressed HEP activities related to improving balance, coordination, kinesthetic sense, posture, motor skill, proprioception of scapular, scapulothoracic and UE control with self care, reaching, carrying, lifting, house/yardwork, driving/computer work    [] Comments:    Manual Treatments:  PROM / STM / Oscillations-Mobs:  G-I, II, III, IV (PA's, Inf., Post.)  [x] (75266) Provided manual therapy to mobilize soft tissue/joints of cervical/CT, scapular GHJ and UE for the purpose of modulating pain, promoting relaxation,  increasing ROM, reducing/eliminating soft tissue swelling/inflammation/restriction, improving soft tissue extensibility and allowing for proper ROM for normal function with self care, reaching, carrying, lifting, house/yardwork, driving/computer work  [] Comments:    ADL Training:  [] (03381) Provided self-care/home management training related to activities of daily living and compensatory training, and/or use of adaptive equipment   [] Comments:     Splinting:  [] Fabrication of:   [] (71766) Orthotic/Prosthetic Management, subsequent encounter  [] (19631) Orthotic management and training (fitting and assessment)  [] Comments:    Modalities:     Charges:  Timed Code Treatment Minutes: 45   Total Treatment Minutes: 45     [] EVAL (LOW) 86288   [] OT Re-eval (45342)  [] EVAL (MOD) 92333   [] EVAL (HIGH) 93263       [x] Mariam (18944) x   1  [] BCQAR(61386)  [] NMR (76469) x     [] Estim (attended) (83697)   [x] Manual ([id number]) x     [] US (47171)  [x] TA (89139) x   1 [] Paraffin (61032)  [] ADL  ([phone]) x    [] Splint/L code:    [] Estim (unattended) ([id number])  [] Fluidotherapy (13359)  [] Other:     GOALS:  1) Patient stated goal: To gain strength and activity tolerance needed to return back home independently. []? Progressing: []? Met: []? Not Met: [x]? Adjusted   9/21/20: Pt has sold her home to live with her parents indefinitely due to continued medical issues and nutrition- goal ceased at this time. 2) Patient stated goal: To complete bathing in stance with no use of shower chair  [x]? Progressing: []? Met: []? Not Met: []? Adjusted   9/21/20: near complete; pt required shower chair for final few minutes of most recent shower     Therapist goals for Patient:   Short Term Goals: To be achieved in: 30 days  1. Pt will increase  strength in B hands by 10 lbs by 9/14 to increase functional capacity for ADL/IADL tasks  [x]? Progressing: []? Met: []? Not Met: []? Adjusted  2.  Pt will demo increased activity tolerance and safety needed to complete cooking task at independent level by 9/14  [x]? Progressing: []? Met: []? Not Met: []? Adjusted  3. Pt will demo increase shoulder strength as evidence by ability to complete cleaning task with use of broom or vacuum by 9/14  [x]? Progressing: []? Met: []? Not Met: []? Adjusted     Long Term Goals: To be achieved by polly  1. Pt will will report a QuickDASH Symptom Severity Scale score of 30% or less indicating increased safety and functional independence in desired occupational pursuits by discharge. [x]? Progressing: []? Met: []? Not Met: []? Adjusted    Progression Towards Functional goals:  [x] Patient is progressing as expected towards functional goals listed. [] Progression is slowed due to complexities listed. [] Progression has been slowed due to co-morbidities. [] Plan just implemented, too soon to assess goals progression  [] All goals are met  [] Other:     ASSESSMENT:  See eval    Treatment/Activity Tolerance:  [x] Patient tolerated treatment well [] Patient limited by fatique  [] Patient limited by pain  [] Patient limited by other medical complications  [] Other:     Prognosis: [x] Good [] Fair  [] Poor    Patient Requires Follow-up: [x] Yes  [] No    PLAN: See eval  [x] Continue per plan of care [] Alter current plan (see comments)  [] Plan of care initiated [] Hold pending MD visit [] Discharge    Electronically signed by: RACQUEL Carballo/L [id number]      Note: If patient does not return for scheduled/ recommended follow up visits, this note will serve as a discharge from care along with most recent update on progress.

## 2020-09-29 ENCOUNTER — HOSPITAL ENCOUNTER (OUTPATIENT)
Dept: PHYSICAL THERAPY | Age: 35
Setting detail: THERAPIES SERIES
Discharge: HOME | End: 2020-09-29
Payer: COMMERCIAL

## 2020-09-29 ENCOUNTER — HOSPITAL ENCOUNTER (OUTPATIENT)
Dept: OCCUPATIONAL THERAPY | Age: 35
Setting detail: THERAPIES SERIES
Discharge: HOME | End: 2020-09-29
Payer: COMMERCIAL

## 2020-09-29 PROCEDURE — 97110 THERAPEUTIC EXERCISES: CPT

## 2020-09-29 NOTE — FLOWSHEET NOTE
[facility] - Outpatient Occupational Therapy    Occupational Therapy Daily Treatment Note  Date:  2020    Patient: Justus Tejada   : 1985   MRN: [id number]  Referring Physician:  Dr. Sky Cooley Diagnosis Information:  Dx- physical deconditioning   Due to [facility]                                      Insurance information:  [facility]; ($15 copay, 20 v per year, no estim)  Date of Injury:  Date of Surgery:       Visit # Insurance Allowable Date Range    + 08        Date of Patient follow up with Physician:     Progress Note: []  Yes  [x]  No  Next due by: Visit #20      Latex Allergy:  [x]No      []Yes    Pacemaker:  [x] No       [] Yes     Preferred Language for Healthcare:   [x]English       []other:    Pain level:  5/10 constant abdominal pain  Neck pain increased last night and kept her awake last night    SUBJECTIVE:   Patient reports she was out to [employer] over the weekend and was out for about 3 hours over the weekend , waking up with soreness is the most frustrating thing and opening water bottles is very difficult , she states she is standing about 50-60% of shower. Functional Disability Index: Quick DASH: raw score = ; dysfunction = 43%    OBJECTIVE: See eval    Hand Assessment Left  Right     Strength (lbs) 21  : 29 26  : 31   Lateral Pinch Strength (lbs) 6  : 11 8  : 11   3 Jaw Nicholas Pinch Strength (lbs) 6  : 8 9  : 11   Tip Pinch Strength (lbs) 5  : 6.5 7  : 9   Opposition (Y/N) Y Y       RESTRICTIONS/PRECAUTIONS: lifting restriction of 8#, frail, J peg tubing      Exercises/Interventions: Patient continues to report tightness in upper trapezius. Patient rec'd myofascial release of upper traps.   Patient then worked in short sit at edge of mat with ues in weight bearing at side worked on achieving thoracic extension with scapular adduction and then engaging lower abdominals for posterior pelvic tilt times 10 reps. Patient needed cues to stabilize upper back and decrease compensation with elevation of trapezius. Patient then worked in short sit at edge of mat holding vertical dowel and moving into external shoulder rotation with orange theraband times 10 reps each shoulder. Patient then worked on sit to 1/2 stand holding 2 pound weights in each hand with emphasis on stable scapula and pelvis needing verbal cues for engagement of lower abdominals and glutes. Patient completed times 6 reps before fatigued. Patient provided with written directions for above exc. Patient instructed on importance of eccentric control throughout to decrease tightness in upper traps. Verbalized understanding. Patient then worked on strengthening hands with use of finger web times 10 / release, pronation / supination, wrist flexion/ extension with use of red flex bar. Times ten reps each. Therapeutic Exercises  Resistance / level Sets/sec Reps Notes               Pronation/supination with full grasp Red flexbar 2 5          Neuromuscular Re-ed / Therapeutic Activities                                                 Manual Intervention                                                     Patient education:  EVAL, POC, HEP, Role of OT, Goals- pt verbalized understanding, pt would benefit from visual aid of HEP in future session      Home Exercise Program:  Pt educated to complete the following bodyweight exercises to increase strength and endurance:  1) shoulder flexion  2) shoulder extension  3) elbow flexion   4) elbow extension  5) shoulder abduction  6) shoulder adduction  8/26/20: scapular retraction/protraction x10, 3-5x/day to increase scapular stability and improve posture  8/28/20: theraputty TE; 10 minutes max 2-4x/day  9/1/20: cat/cow and alternating UE reach with LE lift in quadruped  9/29 sit to 1/2 stand, anterior/ posterior pelvic tilt and external shoulder rotation to be completed 3-10 reps pending fatigue. Therapeutic Exercise and NMR:  [x] (43972) Provided verbal/tactile cueing for activities related to strengthening, flexibility, endurance, ROM  for improvements in scapular, scapulothoracic and UE control with self care, reaching, carrying, lifting, house/yardwork, driving/computer work. [x] (62960) Provided verbal/tactile cueing for activities related to improving balance, coordination, kinesthetic sense, posture, motor skill, proprioception  to assist with  scapular, scapulothoracic and UE control with self care, reaching, carrying, lifting, house/yardwork, driving/computer work.   [] Comments:    Therapeutic Activities:    [x] (91177 or 75291) Provided verbal/tactile cueing for activities related to improving balance, coordination, kinesthetic sense, posture, motor skill, proprioception and motor activation to allow for proper function of scapular, scapulothoracic and UE control with self care, carrying, lifting, driving/computer work  [] Comments:    Home Exercise Program:    [] (41614) Reviewed/Progressed HEP activities related to strengthening, flexibility, endurance, ROM of scapular, scapulothoracic and UE control with self care, reaching, carrying, lifting, house/yardwork, driving/computer work  [] (05786) Reviewed/Progressed HEP activities related to improving balance, coordination, kinesthetic sense, posture, motor skill, proprioception of scapular, scapulothoracic and UE control with self care, reaching, carrying, lifting, house/yardwork, driving/computer work    [] Comments:    Manual Treatments:  PROM / STM / Oscillations-Mobs:  G-I, II, III, IV (PA's, Inf., Post.)  [x] (47116) Provided manual therapy to mobilize soft tissue/joints of cervical/CT, scapular GHJ and UE for the purpose of modulating pain, promoting relaxation,  increasing ROM, reducing/eliminating soft tissue swelling/inflammation/restriction, improving soft tissue extensibility and allowing for proper ROM for normal function with self care, reaching, carrying, lifting, house/yardwork, driving/computer work  [] Comments:    ADL Training:  [] (62894) Provided self-care/home management training related to activities of daily living and compensatory training, and/or use of adaptive equipment   [] Comments:     Splinting:  [] Fabrication of:   [] (64488) Orthotic/Prosthetic Management, subsequent encounter  [] (62445) Orthotic management and training (fitting and assessment)  [] Comments:    Modalities:     Charges:  Timed Code Treatment Minutes: 45   Total Treatment Minutes: 45     [] EVAL (LOW) 31963   [] OT Re-eval (93141)  [] EVAL (MOD) 66307   [] EVAL (HIGH) 71606       [x] Mariam (83204) x   3  [] KCGYL(27941)  [] NMR (94492) x     [] Estim (attended) (03490)   [] Manual ([id number]) x     [] US (67008)  [] TA () x     [] Paraffin (84216)  [] ADL  ([phone]) x    [] Splint/L code:    [] Estim (unattended) (22 159190)  [] Fluidotherapy (24816)  [] Other:     GOALS:  1) Patient stated goal: To gain strength and activity tolerance needed to return back home independently. []? Progressing: []? Met: []? Not Met: [x]? Adjusted   9/21/20: Pt has sold her home to live with her parents indefinitely due to continued medical issues and nutrition- goal ceased at this time. 2) Patient stated goal: To complete bathing in stance with no use of shower chair  [x]? Progressing: []? Met: []? Not Met: []? Adjusted   9/21/20: near complete; pt required shower chair for final few minutes of most recent shower     Therapist goals for Patient:   Short Term Goals: To be achieved in: 30 days  1. Pt will increase  strength in B hands by 10 lbs by 9/14 to increase functional capacity for ADL/IADL tasks  [x]? Progressing: []? Met: []? Not Met: []? Adjusted  2. Pt will demo increased activity tolerance and safety needed to complete cooking task at independent level by 9/14  [x]? Progressing: []? Met: []? Not Met: []? Adjusted  3.  Pt will demo increase shoulder strength as evidence by ability to complete cleaning task with use of broom or vacuum by 9/14  [x]? Progressing: []? Met: []? Not Met: []? Adjusted     Long Term Goals: To be achieved by polly  1. Pt will will report a QuickDASH Symptom Severity Scale score of 30% or less indicating increased safety and functional independence in desired occupational pursuits by discharge. [x]? Progressing: []? Met: []? Not Met: []? Adjusted    Progression Towards Functional goals:  [x] Patient is progressing as expected towards functional goals listed. [] Progression is slowed due to complexities listed. [] Progression has been slowed due to co-morbidities. [] Plan just implemented, too soon to assess goals progression  [] All goals are met  [] Other:     ASSESSMENT:  See eval    Treatment/Activity Tolerance:  [x] Patient tolerated treatment well [] Patient limited by fatique  [] Patient limited by pain  [] Patient limited by other medical complications  [] Other:     Prognosis: [x] Good [] Fair  [] Poor    Patient Requires Follow-up: [x] Yes  [] No    PLAN: See eval  [x] Continue per plan of care [] Alter current plan (see comments)  [] Plan of care initiated [] Hold pending MD visit [] Discharge    Electronically signed by: Charisma Ruff OTR/L [id number]      Note: If patient does not return for scheduled/ recommended follow up visits, this note will serve as a discharge from care along with most recent update on progress.

## 2020-09-29 NOTE — FLOWSHEET NOTE
* = painful         Orthopaedic Special Tests  Positive  Negative  NT Comments    30 sec STS        9 reps   sahrmann level 0.5       Able to complete    sahrmann level 1a       Unable to keep back on table    6 MWT    1166 ft / 36 m       RESTRICTIONS/PRECAUTIONS: lifting restriction of 8#, frail, J peg tubing     Exercises/Interventions: Pt was 10min late this date but PT was able to acomodate pt.     Therapeutic Exercises (87851) Resistance / level Sets/sec Reps Notes   Nustep  Step one   bike L3   L1  Level 1  5 min      IB  HR/TR  30 sec  2 x2  x10    LAQ  Seated march     [facility] lower ab strength   0.5  1.5  2 2  210 B  10 B   Mat therex:  · Supine SLR  · SAQ  · Hip Add squeezes  · Hooklying clamshells   · Supine shoulder ER stretch         Supine with 2 pillows          Done after CT   Seated therex:  · Rows with TB loop  · B ER with TB loop    Seated on green SB:  Mid rows  LPD  High rows  Ant/post tilts  Lateral tilts  cw and ccw circles   OH lifts  Trunk twists  Chops Small Green medi ball  \" 1  1  1  1  1  1x20  x20  x20  x10  x10  x10  First set with Talbot TB   Standing therex:  · Hip 3 way  · FWD step ups  · Lateral step ups  · Ham curl  · Squats  · High knee march  · Hip abd   · Lateral band steps   May need orange TB at home   No UE assist, done in // bars   No UE assist, done in // bars  B UE support  B UE support  B UE support   B UE support, small distance to stay close to bag   Gliders - retro     Gliders - lateral     Free weights:  Bicep curl  Hammer curl  Palm down curl   Lat raises   OH press   Tricep kick back   3#  3#  3#  3#  3#  3# 9/9:  Done Standing    SB rolls on wall   SB rainbows on wall  Red  Red Small lean fwd for incr stretch   Cables:  Mid rows   LPD  Hip ext   Hip abd   Hip flexion   3 pl  3 pl  1 pl  1 pl  1 pl     2  2  2     x10 B  x10 B  x10 B    Sidebending stretch with swiss ball  Discontinued - caused pressure in side of abdomen    Swiss ball extension stretch Therapeutic Activities (73318)       Posture with 1/2 foam roll  · CT with scap squeeze  · B ER     Head position                            Neuromuscular Re-ed (67304)       Chin tucks in supine      Weighted walkouts with waist strap 1.5pl Fwd, retro, R, L   Tiltboard taps - A/P, M/L     Tiltboard DLS - A/P, M/L     Bosu lunges on blue side  Light fingertips for balance    Bosu - black side  · Mini squats  · Moguls        CC paloff press 1.5pl 1 5 R/L                         Manual Intervention (42098)       cervical stretching into L SBing and L rotation, rotational mobs grade 1 to correct L upper t spine rotations, gentle cervical traction in neutral, DTM to L cervical paraspinals       Gentle cervical traction in neutral, STM to B UTs, cervical paraspinals and LS, cervical stretching into L SBing and L rotation, SOR,         Gentle cervical traction in neutral, STM to B UTs, manual stretching of UTs, LS, and full flexion with rotation, SOR       PROM into SBing and rot, grade 2 PA mobs through c spine, DTM to R UT and scalenes, SOR, manual traction in neutral gently, side glides B grade 3    8/31: Increased tightness when SBing and rot to R   DTM to B UT, SOR, STM to cervical paraspinals, manual traction, PROM S + rot, OA nods, grade 2 PA mobs through mid to low C-spine        PROM into SBing and rot in neutral and flexion,   manual traction in neutral gently, DTM to B UT and LS             Pt. Education:  8/14: patient educated on diagnosis, prognosis and expectations for rehab, all patient questions were answered; stressed importance of taking breaks when needed and completing HEP each day in short bouts; encouraged pt to find head neutral every time she passes a mirror to avoid holding head towards the right     Home Exercise Program:  Access Code: [email or web address]   URL: [email or web address]   Date: 09/23/2020   Prepared by: Ivanna Ross     Exercises   Supine 90/90 Alternating Toe Touch - 10 reps - 3 sets - 1x daily - 7x weekly   Modified Supine 90/90 Leg Extensions - 10 reps - 3 sets - 1x daily - 7x weekly     Access Code: [email or web address]   URL: [email or web address]   Date: 09/14/2020   Prepared by: Yehuda Limon   + lime TB  Exercises   Standing Bicep Curls Supinated with Dumbbells - 10 reps - 2 sets - 1x daily - 7x weekly   Standing Bicep Curls Neutral with Dumbbells - 10 reps - 2 sets - 1x daily - 7x weekly   Standing Pronated Elbow Flexion with Dumbbell - 10 reps - 2 sets - 1x daily - 7x weekly   Shoulder Abduction with Dumbbells - Palms Down - 10 reps - 2 sets - 1x daily - 7x weekly   Shoulder Overhead Press in Abduction with Dumbbells - 10 reps - 2 sets - 1x daily - 7x weekly   Standing Bent Over Triceps Extension - 10 reps - 2 sets - 1x daily - 7x weekly     Access Code: [email or web address]   URL: [email or web address]   Date: 08/26/2020   Prepared by: Siri Cohen with Resistance - 10 reps - 1 sets - 2x daily - 7x weekly   Shoulder External Rotation with Resistance - 10 reps - 1 sets - 2x daily - 7x weekly   Standing Hip Flexion with Resistance Loop - 10 reps - 2 sets - 1x daily - 7x weekly   Hip Abduction with Resistance Loop - 10 reps - 2 sets - 1x daily - 7x weekly   Hip Extension with Resistance Loop - 10 reps - 2 sets - 1x daily - 7x weekly     Access Code: [email or web address]   URL: [email or web address]/   Date: 08/19/2020   Prepared by: Siri Cohen with Resistance - 10 reps - 1 sets - 2x daily - 7x weekly   Shoulder External Rotation with Resistance - 10 reps - 1 sets - 2x daily - 7x weekly     Access Code: [email or web address]   URL: [email or web address]   Date: 08/14/2020   Prepared by: Dimas Prieto     Exercises   Seated Long Arc Quad - 10 reps - 3 sets - 1x daily - 7x weekly   Seated March - 10 reps - 3 sets - 1x daily - 7x weekly   Supine Active Straight Leg Raise - 10 reps - 3 sets - 1x daily - 7x weekly Therapeutic Exercise and NMR EXR  [x] (42259) Provided verbal/tactile cueing for activities related to strengthening, flexibility, endurance, ROM for improvements in  [x] LE / Lumbar: LE, proximal hip, and core control with self care, mobility, lifting, ambulation. [x] UE / Cervical: cervical, postural, scapular, scapulothoracic and UE control with self care, reaching, carrying, lifting, house/yardwork, driving, computer work.  [] (09609) Provided verbal/tactile cueing for activities related to improving balance, coordination, kinesthetic sense, posture, motor skill, proprioception to assist with   [] LE / lumbar: LE, proximal hip, and core control in self care, mobility, lifting, ambulation and eccentric single leg control. [] UE / cervical: cervical, scapular, scapulothoracic and UE control with self care, reaching, carrying, lifting, house/yardwork, driving, computer work.   [] (03783) Therapist is in constant attendance of 2 or more patients providing skilled therapy interventions, but not providing any significant amount of measurable one-on-one time to either patient, for improvements in  [] LE / lumbar: LE, proximal hip, and core control in self care, mobility, lifting, ambulation and eccentric single leg control. [] UE / cervical: cervical, scapular, scapulothoracic and UE control with self care, reaching, carrying, lifting, house/yardwork, driving, computer work.      NMR and Therapeutic Activities:    [x] (38689 or 14987) Provided verbal/tactile cueing for activities related to improving balance, coordination, kinesthetic sense, posture, motor skill, proprioception and motor activation to allow for proper function of   [x] LE: / Lumbar core, proximal hip and LE with self care and ADLs  [] UE / Cervical: cervical, postural, scapular, scapulothoracic and UE control with self care, carrying, lifting, driving, computer work.   [] (75423) Gait Re-education- Provided training and instruction to the patient for proper LE, core and proximal hip recruitment and positioning and eccentric body weight control with ambulation re-education including up and down stairs     Home Management Training / Self Care:  [] (75849) Provided self-care/home management training related to activities of daily living and compensatory training, and/or use of adaptive equipment for improvement with: ADLs and compensatory training, meal preparation, safety procedures and instruction in use of adaptive equipment, including bathing, grooming, dressing, personal hygiene, basic household cleaning and chores. Home Exercise Program:    [x] (95973) Reviewed/Progressed HEP activities related to strengthening, flexibility, endurance, ROM of   [x] LE / Lumbar: core, proximal hip and LE for functional self-care, mobility, lifting and ambulation/stair navigation   [] UE / Cervical: cervical, postural, scapular, scapulothoracic and UE control with self care, reaching, carrying, lifting, house/yardwork, driving, computer work  [] (50223)Reviewed/Progressed HEP activities related to improving balance, coordination, kinesthetic sense, posture, motor skill, proprioception of   [] LE: core, proximal hip and LE for self care, mobility, lifting, and ambulation/stair navigation    [] UE / Cervical: cervical, postural,  scapular, scapulothoracic and UE control with self care, reaching, carrying, lifting, house/yardwork, driving, computer work    Manual Treatments:  PROM / STM / Oscillations-Mobs:  G-I, II, III, IV (PA's, Inf., Post.)  [] (93715) Provided manual therapy to mobilize LE, proximal hip and/or LS spine soft tissue/joints for the purpose of modulating pain, promoting relaxation,  increasing ROM, reducing/eliminating soft tissue swelling/inflammation/restriction, improving soft tissue extensibility and allowing for proper ROM for normal function with   [] LE / lumbar: self care, mobility, lifting and ambulation.     [] UE / Cervical: self care, reaching, carrying, lifting, house/yardwork, driving, computer work. Modalities:  [] (99067) Vasopneumatic compression: Utilized vasopneumatic compression to decrease edema / swelling for the purpose of improving mobility and quad tone / recruitment which will allow for increased overall function including but not limited to self-care, transfers, ambulation, and ascending / descending stairs. Modalities:    9/3, 8/28, 9/1: MHP to cerv spine x 10 min in supine   9/23: MHP to B shoulders and cervical spine in supine x 15 min (between PT and OT sessions)    Charges:  Timed Code Treatment Minutes: 40   Total Treatment Minutes: 40     [] EVAL - LOW (69817) x   [] EVAL - MOD (17317)  [] EVAL - HIGH (95445)  [] RE-EVAL (78412)  [x] ALLEN(97572) x 3       [] Ionto  [] NMR (19276) x       [] Vaso  [] Manual (85557) x        [] Ultrasound  [] TA x        [] Mech Traction (36111)  [] Aquatic Therapy x     [] ES (un) (80641):   [] Home Management Training x  [] ES(attended) (21121)   [] Dry Needling 1-2 muscles (66856):  [] Dry Needling 3+ muscles (533582)  [] Group:      [] Other:     GOALS:  Patient stated goal: gain strength and endurance     Therapist goals for Patient:   Short Term Goals: To be achieved in: 2 weeks  1. Independent in HEP and progression per patient tolerance, in order to prevent re-injury. 2. Patient will have a decrease in pain to facilitate improvement in movement, function, and ADLs as indicated by Functional Deficits. Long Term Goals: To be achieved in: 6 weeks  1. Pt will increase 30 sec STS reps to at least 8 to demo improvement in endurance. GOAL MET 9/16  2. Patient will increase strength in B UEs to at least 4+/5 and B LEs to at least 4/5 so patient can stand, walk, and complete ADLs independently. Progressing   3. Patient will demonstrate an increase in postural awareness and control to allow for proper functional mobility as indicated by patients Functional Deficits. Progressing   4. PASCUAL HUDDLESTON PT, [facility] [id number]  Note: If patient does not return for scheduled/ recommended follow up visits, this note will serve as a discharge from care along with most recent update on progress.

## 2020-10-08 ENCOUNTER — HOSPITAL ENCOUNTER (OUTPATIENT)
Dept: OCCUPATIONAL THERAPY | Age: 35
Setting detail: THERAPIES SERIES
Discharge: HOME | End: 2020-10-08
Payer: COMMERCIAL

## 2020-10-08 ENCOUNTER — HOSPITAL ENCOUNTER (OUTPATIENT)
Dept: PHYSICAL THERAPY | Age: 35
Setting detail: THERAPIES SERIES
Discharge: HOME | End: 2020-10-08
Payer: COMMERCIAL

## 2020-10-08 PROCEDURE — 97110 THERAPEUTIC EXERCISES: CPT

## 2020-10-08 NOTE — FLOWSHEET NOTE
[facility] - Outpatient Occupational Therapy    Occupational Therapy Daily Treatment Note  Date:  10/8/2020    Patient: Keeley Russell   : 1985   MRN: [id number]  Referring Physician:  Dr. Indra Andres Diagnosis Information:  Dx- physical deconditioning   Due to [facility]                                      Insurance information:  [employer]; ($15 copay, 20 v per year, no estim)  Date of Injury:  Date of Surgery:       Visit # Insurance Allowable Date Range    +        Date of Patient follow up with Physician:     Progress Note: []  Yes  [x]  No  Next due by: Visit #20      Latex Allergy:  [x]No      []Yes    Pacemaker:  [x] No       [] Yes     Preferred Language for Healthcare:   [x]English       []other:    Pain level:  5/10 constant abdominal pain  Neck pain increased last night and kept her awake last night    SUBJECTIVE:   Patient reports she was out to [place] over the weekend and was out for about 3 hours over the weekend , waking up with soreness is the most frustrating thing and opening water bottles is very difficult , she states she is standing about 50-60% of shower. Functional Disability Index: Quick DASH: raw score = ; dysfunction = 43%    OBJECTIVE: See eval    Hand Assessment Left  Right     Strength (lbs) 21  : 29 26  : 31   Lateral Pinch Strength (lbs) 6  : 11 8  : 11   3 Jaw Nicholas Pinch Strength (lbs) 6  : 8 9  : 11   Tip Pinch Strength (lbs) 5  : 6.5 7  : 9   Opposition (Y/N) Y Y       RESTRICTIONS/PRECAUTIONS: lifting restriction of 8#, frail, J peg tubing      Exercises/Interventions: Patient continues to report tightness in upper trapezius. Patient rec'd myofascial release of upper traps.   Patient then worked in short sit at edge of mat with ues in weight bearing at side worked on achieving thoracic extension with scapular adduction and then engaging lower abdominals for posterior pelvic tilt times 10 reps. Patient needed cues to stabilize upper back and decrease compensation with elevation of trapezius. Patient then worked in short sit at edge of mat holding vertical dowel and moving into external shoulder rotation with orange theraband times 10 reps each shoulder. Patient then worked on sit to 1/2 stand holding 2 pound weights in each hand with emphasis on stable scapula and pelvis needing verbal cues for engagement of lower abdominals and glutes. Patient completed times 6 reps before fatigued. Patient also educated in positioning in supine with towel roll along spine to increase scapular retraction and educated on completion of single leg bridge with stable pelvis and shoulder in external shoulder rotation and slight abduction    Therapeutic Exercises  Resistance / level Sets/sec Reps Notes               Pronation/supination with full grasp Red flexbar 2 5          Neuromuscular Re-ed / Therapeutic Activities                                                 Manual Intervention                                                     Patient education:  EVAL, POC, HEP, Role of OT, Goals- pt verbalized understanding, pt would benefit from visual aid of HEP in future session      Home Exercise Program:  Pt educated to complete the following bodyweight exercises to increase strength and endurance:  1) shoulder flexion  2) shoulder extension  3) elbow flexion   4) elbow extension  5) shoulder abduction  6) shoulder adduction  8/26/20: scapular retraction/protraction x10, 3-5x/day to increase scapular stability and improve posture  8/28/20: theraputty TE; 10 minutes max 2-4x/day  9/1/20: cat/cow and alternating UE reach with LE lift in quadruped  9/29 sit to 1/2 stand, anterior/ posterior pelvic tilt and external shoulder rotation to be completed 3-10 reps pending fatigue.        Therapeutic Exercise and NMR:  [x] (08940) Provided verbal/tactile cueing for activities related to strengthening, flexibility, self-care/home management training related to activities of daily living and compensatory training, and/or use of adaptive equipment   [] Comments:     Splinting:  [] Fabrication of:   [] (38116) Orthotic/Prosthetic Management, subsequent encounter  [] (94926) Orthotic management and training (fitting and assessment)  [] Comments:    Modalities:     Charges:  Timed Code Treatment Minutes: 45   Total Treatment Minutes: 45     [] EVAL (LOW) 02231   [] OT Re-eval (60280)  [] EVAL (MOD) 85770   [] EVAL (HIGH) 76748       [x] Mariam (70636) x   3  [] MSRSX(74444)  [] NMR (64893) x     [] Estim (attended) (94865)   [] Manual ([id number]) x     [] US (32476)  [] TA () x     [] Paraffin (28622)  [] ADL  ([phone]) x    [] Splint/L code:    [] Estim (unattended) (22 119185)  [] Fluidotherapy (36354)  [] Other:     GOALS:  1) Patient stated goal: To gain strength and activity tolerance needed to return back home independently. []? Progressing: []? Met: []? Not Met: [x]? Adjusted   9/21/20: Pt has sold her home to live with her parents indefinitely due to continued medical issues and nutrition- goal ceased at this time. 2) Patient stated goal: To complete bathing in stance with no use of shower chair  [x]? Progressing: []? Met: []? Not Met: []? Adjusted   9/21/20: near complete; pt required shower chair for final few minutes of most recent shower     Therapist goals for Patient:   Short Term Goals: To be achieved in: 30 days  1. Pt will increase  strength in B hands by 10 lbs by 9/14 to increase functional capacity for ADL/IADL tasks  [x]? Progressing: []? Met: []? Not Met: []? Adjusted  2. Pt will demo increased activity tolerance and safety needed to complete cooking task at independent level by 9/14  [x]? Progressing: []? Met: []? Not Met: []? Adjusted  3. Pt will demo increase shoulder strength as evidence by ability to complete cleaning task with use of broom or vacuum by 9/14  [x]? Progressing: []? Met: []?  Not Met: []? Adjusted     Long Term Goals: To be achieved by polly  1. Pt will will report a QuickDASH Symptom Severity Scale score of 30% or less indicating increased safety and functional independence in desired occupational pursuits by discharge. [x]? Progressing: []? Met: []? Not Met: []? Adjusted    Progression Towards Functional goals:  [x] Patient is progressing as expected towards functional goals listed. [] Progression is slowed due to complexities listed. [] Progression has been slowed due to co-morbidities. [] Plan just implemented, too soon to assess goals progression  [] All goals are met  [] Other:     ASSESSMENT:  See eval    Treatment/Activity Tolerance:  [x] Patient tolerated treatment well [] Patient limited by fatique  [] Patient limited by pain  [] Patient limited by other medical complications  [] Other:     Prognosis: [x] Good [] Fair  [] Poor    Patient Requires Follow-up: [x] Yes  [] No    PLAN: See eval  [x] Continue per plan of care [] Alter current plan (see comments)  [] Plan of care initiated [] Hold pending MD visit [] Discharge    Electronically signed by:          Note: If patient does not return for scheduled/ recommended follow up visits, this note will serve as a discharge from care along with most recent update on progress.

## 2020-10-08 NOTE — FLOWSHEET NOTE
[address] [facility]  Phone: [phone]   Fax: [phone]    Physical Therapy Daily Treatment Note  Date:  10/8/2020    Patient Name:  Treva Ardon    :  1985  MRN: [id number]  Medical/Treatment Diagnosis Information:  · Diagnosis: physical deconditioning  Treatment Diagnosis: decreased B UE and LE stretch, poor endurance, impaired posture, increased muscle tension through R cervicothoracic region, spinal malalignment  Insurance/Certification information:  PT Insurance Information: [facility] ($15 copay, 20 v per year no estim)  Physician Information:  Referring Practitioner:  - Pt provided information for surgeon who is overseeing all care now:  Dr. Jocelyn Canas  [address] [facility]  Phone: [phone]  Fax: [phone]  Plan of care signed (Y/N): []  Yes [x]  No     Date of Patient follow up with Physician: ?     Progress Report: []  Yes  [x]  No     Date Range for reporting period:  Beginnin2020  Endin/16    Progress report due (10 Rx/or 30 days whichever is less): visit #58      Recertification due (POC duration/ or 90 days whichever is less): 2020     Visit # Insurance Allowable Auth required? Date Range     +  20 []  Yes  [x]  No n/a     Latex Allergy:  [x]NO      []YES  Preferred Language for Healthcare:   [x]English       []other:    Functional Scale:        Date assessed:  30 sec STS: raw score 5 reps                                           2020    30 sec STS: raw score 9 reps                                           2020    Pain level:  5/10     SUBJECTIVE: Pt reports that she sold her house and finished moving out this weekend. Pt reports pain is improving overall, the pain in her side is getting a lot better. Her neck pain has been bad though and has even been waking her up at night.       OBJECTIVE:   :  Strength (0-5) Left Right   Shoulder flexion 4 4 Shoulder abd 4- 4*   Shoulder ER 4 4+   Shoulder IR 4 4        Seated hip flexion 4- 4-   Seated hip ER 4- 4-   Seated hip IR 4- 4-   Knee flexion 4 4   Knee ext 4 4   Ankle DF 4+ 4+            * = painful         Orthopaedic Special Tests  Positive  Negative  NT Comments    30 sec STS        9 reps   sahrmann level 0.5       Able to complete    sahrmann level 1a       Unable to keep back on table    6 MWT    1166 ft / 36 m       RESTRICTIONS/PRECAUTIONS: lifting restriction of 8#, frail, J peg tubing     Exercises/Interventions: Pt was 10min late this date but PT was able to acomodate pt.     Therapeutic Exercises (55659) Resistance / level Sets/sec Reps Notes   Nustep  Step one   bike L3   L1  Level 1  6 min      IB  HR/TR  30 sec  2 x2  x10    LAQ  Seated march     sarhamnn lower ab strength   0.5  1.5  2  3   2  2  10 B  10 B   Mat therex:  · Supine SLR  · SAQ  · Hip Add squeezes  · Hooklying clamshells   · Supine shoulder ER stretch         Supine with 2 pillows          Done after CT   Seated therex:  · Rows with TB loop  · B ER with TB loop    Seated on green SB:  Mid rows  LPD  High rows  Ant/post tilts  Lateral tilts  cw and ccw circles   OH lifts  Trunk twists  Chops Small Green medi ball  \" 1  1  1  1  x20  x20  x20  x10    First set with Dakota TB   Standing therex:  · Hip 3 way  · FWD step ups  · Lateral step ups  · Ham curl  · Squats  · High knee march  · Hip abd   · Lateral band steps   May need orange TB at home   No UE assist, done in // bars   No UE assist, done in // bars  B UE support  B UE support  B UE support   B UE support, small distance to stay close to bag   Gliders - retro     Gliders - lateral     Free weights:  Bicep curl  Hammer curl  Palm down curl   Lat raises   OH press   Tricep kick back   3#  3#  3#  3#  3#  3# 9/9:  Done Standing    SB rolls on wall   SB rainbows on wall  Red  Red Small lean fwd for incr stretch   Cables:  Mid rows   LPD  Hip ext   Hip abd   Hip flexion   3 pl  3 pl  1 pl  1 pl  1 pl    Sidebending stretch with swiss ball  Discontinued - caused pressure in side of abdomen    Swiss ball extension stretch     TRX:  · Rows  · Squats  · Y's  · Lunges     1  1  1  1   15  15  15  15                  Therapeutic Activities (44660)       Posture with 1/2 foam roll  · CT with scap squeeze  · B ER     Head position                            Neuromuscular Re-ed (11005)       Chin tucks in supine      Weighted walkouts with waist strap 1.5pl Fwd, retro, R, L   Tiltboard taps - A/P, M/L     Tiltboard DLS - A/P, M/L     Bosu lunges on blue side  Light fingertips for balance    Bosu - black side  · Mini squats  · Moguls        CC paloff press 1.5pl                         Manual Intervention (92831)       cervical stretching into L SBing and L rotation, rotational mobs grade 1 to correct L upper t spine rotations, gentle cervical traction in neutral, DTM to L cervical paraspinals       Gentle cervical traction in neutral, STM to B UTs, cervical paraspinals and LS, cervical stretching into L SBing and L rotation, SOR,         Gentle cervical traction in neutral, STM to B UTs, manual stretching of UTs, LS, and full flexion with rotation, SOR       PROM into SBing and rot, grade 2 PA mobs through c spine, DTM to R UT and scalenes, SOR, manual traction in neutral gently, side glides B grade 3    8/31: Increased tightness when SBing and rot to R   DTM to B UT, SOR, STM to cervical paraspinals, manual traction, PROM S + rot, OA nods, grade 2 PA mobs through mid to low C-spine        PROM into SBing and rot in neutral and flexion,   manual traction in neutral gently, DTM to B UT and LS             Pt. Education:  8/14: patient educated on diagnosis, prognosis and expectations for rehab, all patient questions were answered; stressed importance of taking breaks when needed and completing HEP each day in short bouts; encouraged pt to find head neutral every time she passes a mirror to avoid holding head towards the right     Home Exercise Program:  Access Code: [email or web address]   URL: [email or web address]   Date: 09/23/2020   Prepared by: Yara Lange     Exercises   Supine 90/90 Alternating Toe Touch - 10 reps - 3 sets - 1x daily - 7x weekly   Modified Supine 90/90 Leg Extensions - 10 reps - 3 sets - 1x daily - 7x weekly     Access Code: [email or web address]   URL: [employer]/   Date: 09/14/2020   Prepared by: Yara Lange   + lime TB  Exercises   Standing Bicep Curls Supinated with Dumbbells - 10 reps - 2 sets - 1x daily - 7x weekly   Standing Bicep Curls Neutral with Dumbbells - 10 reps - 2 sets - 1x daily - 7x weekly   Standing Pronated Elbow Flexion with Dumbbell - 10 reps - 2 sets - 1x daily - 7x weekly   Shoulder Abduction with Dumbbells - Palms Down - 10 reps - 2 sets - 1x daily - 7x weekly   Shoulder Overhead Press in Abduction with Dumbbells - 10 reps - 2 sets - 1x daily - 7x weekly   Standing Bent Over Triceps Extension - 10 reps - 2 sets - 1x daily - 7x weekly     Access Code: [email or web address]   URL: [employer]/   Date: 08/26/2020   Prepared by: Ana Urbina with Resistance - 10 reps - 1 sets - 2x daily - 7x weekly   Shoulder External Rotation with Resistance - 10 reps - 1 sets - 2x daily - 7x weekly   Standing Hip Flexion with Resistance Loop - 10 reps - 2 sets - 1x daily - 7x weekly   Hip Abduction with Resistance Loop - 10 reps - 2 sets - 1x daily - 7x weekly   Hip Extension with Resistance Loop - 10 reps - 2 sets - 1x daily - 7x weekly     Access Code: [email or web address]   URL: [employer]/   Date: 08/19/2020   Prepared by: Ana Urbina with Resistance - 10 reps - 1 sets - 2x daily - 7x weekly   Shoulder External Rotation with Resistance - 10 reps - 1 sets - 2x daily - 7x weekly     Access Code: [email or web address]   URL: [email or web address]   Date: 08/14/2020   Prepared by: Connor Preciado Exercises   Seated Long Arc Quad - 10 reps - 3 sets - 1x daily - 7x weekly   Seated March - 10 reps - 3 sets - 1x daily - 7x weekly   Supine Active Straight Leg Raise - 10 reps - 3 sets - 1x daily - 7x weekly     Therapeutic Exercise and NMR EXR  [x] (49997) Provided verbal/tactile cueing for activities related to strengthening, flexibility, endurance, ROM for improvements in  [x] LE / Lumbar: LE, proximal hip, and core control with self care, mobility, lifting, ambulation. [x] UE / Cervical: cervical, postural, scapular, scapulothoracic and UE control with self care, reaching, carrying, lifting, house/yardwork, driving, computer work.  [] (67299) Provided verbal/tactile cueing for activities related to improving balance, coordination, kinesthetic sense, posture, motor skill, proprioception to assist with   [] LE / lumbar: LE, proximal hip, and core control in self care, mobility, lifting, ambulation and eccentric single leg control. [] UE / cervical: cervical, scapular, scapulothoracic and UE control with self care, reaching, carrying, lifting, house/yardwork, driving, computer work.   [] (47789) Therapist is in constant attendance of 2 or more patients providing skilled therapy interventions, but not providing any significant amount of measurable one-on-one time to either patient, for improvements in  [] LE / lumbar: LE, proximal hip, and core control in self care, mobility, lifting, ambulation and eccentric single leg control. [] UE / cervical: cervical, scapular, scapulothoracic and UE control with self care, reaching, carrying, lifting, house/yardwork, driving, computer work.      NMR and Therapeutic Activities:    [x] (42014 or 51174) Provided verbal/tactile cueing for activities related to improving balance, coordination, kinesthetic sense, posture, motor skill, proprioception and motor activation to allow for proper function of   [x] LE: / Lumbar core, proximal hip and LE with self care and ADLs  [] UE / Cervical: cervical, postural, scapular, scapulothoracic and UE control with self care, carrying, lifting, driving, computer work.   [] (60837) Gait Re-education- Provided training and instruction to the patient for proper LE, core and proximal hip recruitment and positioning and eccentric body weight control with ambulation re-education including up and down stairs     Home Management Training / Self Care:  [] (51214) Provided self-care/home management training related to activities of daily living and compensatory training, and/or use of adaptive equipment for improvement with: ADLs and compensatory training, meal preparation, safety procedures and instruction in use of adaptive equipment, including bathing, grooming, dressing, personal hygiene, basic household cleaning and chores.      Home Exercise Program:    [x] (05211) Reviewed/Progressed HEP activities related to strengthening, flexibility, endurance, ROM of   [x] LE / Lumbar: core, proximal hip and LE for functional self-care, mobility, lifting and ambulation/stair navigation   [] UE / Cervical: cervical, postural, scapular, scapulothoracic and UE control with self care, reaching, carrying, lifting, house/yardwork, driving, computer work  [] (26300)Reviewed/Progressed HEP activities related to improving balance, coordination, kinesthetic sense, posture, motor skill, proprioception of   [] LE: core, proximal hip and LE for self care, mobility, lifting, and ambulation/stair navigation    [] UE / Cervical: cervical, postural,  scapular, scapulothoracic and UE control with self care, reaching, carrying, lifting, house/yardwork, driving, computer work    Manual Treatments:  PROM / STM / Oscillations-Mobs:  G-I, II, III, IV (PA's, Inf., Post.)  [] (31455) Provided manual therapy to mobilize LE, proximal hip and/or LS spine soft tissue/joints for the purpose of modulating pain, promoting relaxation,  increasing ROM, reducing/eliminating soft tissue No    Plan for next treatment session:  Overall strength - UEs, LEs, abdominals as able, endurance training, posture training     PLAN: See eval. PT 2x / week for 6 weeks. [x] Continue per plan of care [] Alter current plan (see comments)  [] Plan of care initiated [] Hold pending MD visit [] Discharge    Electronically signed by: Argenis Murphy PT, DPT   Note: If patient does not return for scheduled/ recommended follow up visits, this note will serve as a discharge from care along with most recent update on progress.

## 2020-10-15 ENCOUNTER — APPOINTMENT (OUTPATIENT)
Dept: OCCUPATIONAL THERAPY | Age: 35
End: 2020-10-15
Payer: COMMERCIAL

## 2020-10-15 ENCOUNTER — APPOINTMENT (OUTPATIENT)
Dept: PHYSICAL THERAPY | Age: 35
End: 2020-10-15
Payer: COMMERCIAL

## 2020-10-22 ENCOUNTER — HOSPITAL ENCOUNTER (OUTPATIENT)
Dept: PHYSICAL THERAPY | Age: 35
Setting detail: THERAPIES SERIES
Discharge: HOME | End: 2020-10-22
Payer: COMMERCIAL

## 2020-10-22 ENCOUNTER — HOSPITAL ENCOUNTER (OUTPATIENT)
Dept: OCCUPATIONAL THERAPY | Age: 35
Setting detail: THERAPIES SERIES
Discharge: HOME | End: 2020-10-22
Payer: COMMERCIAL

## 2020-10-22 PROCEDURE — 97112 NEUROMUSCULAR REEDUCATION: CPT

## 2020-10-22 PROCEDURE — 97110 THERAPEUTIC EXERCISES: CPT

## 2020-10-22 PROCEDURE — 97168 OT RE-EVAL EST PLAN CARE: CPT

## 2020-10-22 NOTE — FLOWSHEET NOTE
[x] No       [] Yes     Preferred Language for Healthcare:   [x]English       []other:    Pain level:  A little tight today    SUBJECTIVE:    Patient reports she was on a long car ride to and from [place]. Patient reports she feels like she is getting stronger. Functional Disability Index: Quick DASH: raw score = ; dysfunction = 43%    OBJECTIVE:      Hand Assessment Left  Right  Left 10/22  right    Strength (lbs) 21  9/21: 29 26  9/21: 31 45 45   Lateral Pinch Strength (lbs) 6  9/21: 11 8  9/21: 11     3 Jaw Nicholas Pinch Strength (lbs) 6  9/21: 8 9  9/21: 11 9 9   Tip Pinch Strength (lbs) 5  9/21: 6.5 7  9/21: 9     Opposition (Y/N) Y Y         RESTRICTIONS/PRECAUTIONS: lifting restriction of 8#, frail, J peg tubing      Exercises/Interventions: Patient continues to report tightness in upper trapezius. Patient rec'd myofascial release of upper traps. Patient then worked on trunk and cervical rotation with abduction and external shoulder rotation to stretch. Patient then worked on reaching forward and transitioning to stand times 10 reps and then forward step and reach times ten reps. Side step and reach times ten reps. Patient then worked on forearm and hand strengthening as below.      Therapeutic Exercises  Resistance / level Sets/sec Reps Notes   Bicep curl2#10            Pronation/supination with full grasp Red flexbar 2 10          Wrist flexion/ extension  Red flex bar 2 10    tricep press 2 pound weight 1     Chest press 2 pound weight 1 1o    Chair push up  1 10                         Manual Intervention                                                                                        Patient education:  EVAL, POC, HEP, Role of OT, Goals- pt verbalized understanding, pt would benefit from visual aid of HEP in future session      Home Exercise Program:  Pt educated to complete the following bodyweight exercises to increase strength and endurance:  1) shoulder flexion  2) shoulder extension  3) elbow flexion   4) elbow extension  5) shoulder abduction  6) shoulder adduction  8/26/20: scapular retraction/protraction x10, 3-5x/day to increase scapular stability and improve posture  8/28/20: theraputty TE; 10 minutes max 2-4x/day  9/1/20: cat/cow and alternating UE reach with LE lift in quadruped  9/29 sit to 1/2 stand, anterior/ posterior pelvic tilt and external shoulder rotation to be completed 3-10 reps pending fatigue. Therapeutic Exercise and NMR:  [x] (30512) Provided verbal/tactile cueing for activities related to strengthening, flexibility, endurance, ROM  for improvements in scapular, scapulothoracic and UE control with self care, reaching, carrying, lifting, house/yardwork, driving/computer work. [x] (55918) Provided verbal/tactile cueing for activities related to improving balance, coordination, kinesthetic sense, posture, motor skill, proprioception  to assist with  scapular, scapulothoracic and UE control with self care, reaching, carrying, lifting, house/yardwork, driving/computer work.   [] Comments:    Therapeutic Activities:    [x] (85199 or 63578) Provided verbal/tactile cueing for activities related to improving balance, coordination, kinesthetic sense, posture, motor skill, proprioception and motor activation to allow for proper function of scapular, scapulothoracic and UE control with self care, carrying, lifting, driving/computer work  [] Comments:    Home Exercise Program:    [] (17763) Reviewed/Progressed HEP activities related to strengthening, flexibility, endurance, ROM of scapular, scapulothoracic and UE control with self care, reaching, carrying, lifting, house/yardwork, driving/computer work  [] (37686) Reviewed/Progressed HEP activities related to improving balance, coordination, kinesthetic sense, posture, motor skill, proprioception of scapular, scapulothoracic and UE control with self care, reaching, carrying, lifting, house/yardwork, driving/computer goals for Patient:   Short Term Goals: To be achieved in: 30 days  1. Pt will increase  strength in B hands by 10 lbs by 9/14 to increase functional capacity for ADL/IADL tasks  [x]? Progressing: []? Met: []? Not Met: []? Adjusted  2. Pt will demo increased activity tolerance and safety needed to complete cooking task at independent level by 9/14  [x]? Progressing: []? Met: []? Not Met: []? Adjusted  3. Pt will demo increase shoulder strength as evidence by ability to complete cleaning task with use of broom or vacuum by 9/14  [x]? Progressing: []? Met: []? Not Met: []? Adjusted     Long Term Goals: To be achieved by polly  1. Pt will will report a QuickDASH Symptom Severity Scale score of 30% or less indicating increased safety and functional independence in desired occupational pursuits by discharge. [x]? Progressing: []? Met: []? Not Met: []? Adjusted    Progression Towards Functional goals:  [x] Patient is progressing as expected towards functional goals listed. [] Progression is slowed due to complexities listed. [] Progression has been slowed due to co-morbidities. [] Plan just implemented, too soon to assess goals progression  [] All goals are met  [] Other:     ASSESSMENT:  See eval    Treatment/Activity Tolerance:  [x] Patient tolerated treatment well [] Patient limited by fatique  [] Patient limited by pain  [] Patient limited by other medical complications  [] Other:     Prognosis: [x] Good [] Fair  [] Poor    Patient Requires Follow-up: [x] Yes  [] No    PLAN: See eval  [x] Continue per plan of care [] Alter current plan (see comments)  [] Plan of care initiated [] Hold pending MD visit [] Discharge    Electronically signed by:          Note: If patient does not return for scheduled/ recommended follow up visits, this note will serve as a discharge from care along with most recent update on progress.

## 2020-10-22 NOTE — FLOWSHEET NOTE
[address] [facility] Therapy  Phone: [phone]   Fax: [phone]    Physical Therapy Daily Treatment Note  Date:  10/22/2020    Patient Name:  Clara Palmer    :  1985  MRN: [id number]  Medical/Treatment Diagnosis Information:  · Diagnosis: physical deconditioning  Treatment Diagnosis: decreased B UE and LE stretch, poor endurance, impaired posture, increased muscle tension through R cervicothoracic region, spinal malalignment  Insurance/Certification information:  PT Insurance Information: [facility] ($15 copay, 20 v per year no estim)  Physician Information:  Referring Practitioner:  - Pt provided information for surgeon who is overseeing all care now:  Dr. Nathan Collins  [address] [facility]  Phone: [phone]  Fax: [phone]  Plan of care signed (Y/N): []  Yes [x]  No     Date of Patient follow up with Physician: ?     Progress Report: []  Yes  [x]  No     Date Range for reporting period:  Beginnin2020  Endin/16    Progress report due (10 Rx/or 30 days whichever is less): visit #27      Recertification due (POC duration/ or 90 days whichever is less): 2020     Visit # Insurance Allowable Auth required? Date Range     + 3/8 20 []  Yes  [x]  No n/a     Latex Allergy:  [x]NO      []YES  Preferred Language for Healthcare:   [x]English       []other:    Functional Scale:        Date assessed:  30 sec STS: raw score 5 reps                                           2020    30 sec STS: raw score 9 reps                                           2020    Pain level:  4/10     SUBJECTIVE: Pt reports she just got back from [facility]. Had to drive back because their flight got cancelled. Got a massage two weeks ago for her neck, which felt great but two days later it was back to being painful. Using her J tube only at night now. 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 OBJECTIVE:   9/16:  Strength (0-5) Left Right   Shoulder flexion 4 4   Shoulder abd 4- 4*   Shoulder ER 4 4+   Shoulder IR 4 4        Seated hip flexion 4- 4-   Seated hip ER 4- 4-   Seated hip IR 4- 4-   Knee flexion 4 4   Knee ext 4 4   Ankle DF 4+ 4+            * = painful         Orthopaedic Special Tests  Positive  Negative  NT Comments    30 sec STS        9 reps   sahrmann level 0.5       Able to complete    sahrmann level 1a       Unable to keep back on table    6 MWT    1166 ft / 36 m       RESTRICTIONS/PRECAUTIONS: lifting restriction of 8#, frail, J peg tubing     Exercises/Interventions: Pt was 10min late this date but PT was able to acomodate pt.     Therapeutic Exercises (68481) Resistance / level Sets/sec Reps Notes   Nustep  Step one   Bike    TM forward     1.7 MPH X 5 min     IB  HR/TR     LAQ  Seated march     sarhamnn lower ab strength          deadbug position iso holds  SB lift with knees flexed    Prone press up (mini due to tube)  Leg ext in prone      3     2    10 sec  1    1  1      10 B    x5  x10    x10  x10 B   Mat therex:  · Supine SLR  · SAQ  · Hip Add squeezes  · Hooklying clamshells   · Supine shoulder ER stretch         Supine with 2 pillows          Done after CT   Seated therex:  · Rows with TB loop  · B ER with TB loop    Seated on green SB:  Mid rows  LPD  High rows  Ant/post tilts  Lateral tilts  cw and ccw circles   OH lifts  Trunk twists  Chops Small Green medi ball  \"   First set with [place] TB   Standing therex:  · Hip 3 way  · FWD step ups  · Lateral step ups  · Ham curl  · Squats  · High knee march  · Hip abd   · Lateral band steps   May need orange TB at home   No UE assist, done in // bars   No UE assist, done in // bars  B UE support  B UE support  B UE support   B UE support, small distance to stay close to bag   Gliders - retro     Gliders - lateral     Free weights:  Bicep curl  Hammer curl  Palm down curl   Lat raises   OH press   Tricep kick back 3#  3#  3#  3#  3#  3# 9/9:  Done Standing    SB rolls on wall   SB rainbows on wall  Red  Red Small lean fwd for incr stretch   Cables:  Mid rows   LPD  High to low chop  Hip ext   Hip abd   Hip flexion   3 pl  3 pl  3 pl   22  [id number]  x10 B    Sidebending stretch with swiss ball  Discontinued - caused pressure in side of abdomen    Swiss ball extension stretch     TRX:  · Rows  · Squats  · Y's  · Lunges                    Therapeutic Activities (65911)       Posture with 1/2 foam roll  · CT with scap squeeze  · B ER     Head position                            Neuromuscular Re-ed (14078)       Chin tucks in supine     Weighted walkouts with waist strap Fwd, retro, R, L   Tiltboard taps - A/P, M/L    Tiltboard DLS - A/P, M/L    Bosu lunges on blue side Light fingertips for balance    Bosu - black side  · Mini squats  · Moguls    CC paloff press    Lat stretch at ballet barre   ER stretch on wall   UT/LS stretch on ballet barre   30 sec  30 sec  30 sec x3  x2 B  X2 B     VCs to side bend and rot away from barre   Foam roll along scaps   X 4 min Vertically and horizontal           Manual Intervention (37458)       cervical stretching into L SBing and L rotation, rotational mobs grade 1 to correct L upper t spine rotations, gentle cervical traction in neutral, DTM to L cervical paraspinals       Gentle cervical traction in neutral, STM to B UTs, cervical paraspinals and LS, cervical stretching into L SBing and L rotation, SOR,         Gentle cervical traction in neutral, STM to B UTs, manual stretching of UTs, LS, and full flexion with rotation, SOR       PROM into SBing and rot, grade 2 PA mobs through c spine, DTM to R UT and scalenes, SOR, manual traction in neutral gently, side glides B grade 3    8/31: Increased tightness when SBing and rot to R   DTM to B UT, SOR, STM to cervical paraspinals, manual traction, PROM S + rot, OA nods, grade 2 PA mobs through mid to low C-spine        PROM into SBing and rot in neutral and flexion,   manual traction in neutral gently, DTM to B UT and LS             Pt. Education:  8/14: patient educated on diagnosis, prognosis and expectations for rehab, all patient questions were answered; stressed importance of taking breaks when needed and completing HEP each day in short bouts; encouraged pt to find head neutral every time she passes a mirror to avoid holding head towards the right     Home Exercise Program:  Access Code: [email or web address]   URL: [email or web address]   Date: 09/23/2020   Prepared by: Harpreet Blind     Exercises   Supine 90/90 Alternating Toe Touch - 10 reps - 3 sets - 1x daily - 7x weekly   Modified Supine 90/90 Leg Extensions - 10 reps - 3 sets - 1x daily - 7x weekly     Access Code: [email or web address]   URL: Enish/   Date: 09/14/2020   Prepared by: Harpreet Blind   + lime TB  Exercises   Standing Bicep Curls Supinated with Dumbbells - 10 reps - 2 sets - 1x daily - 7x weekly   Standing Bicep Curls Neutral with Dumbbells - 10 reps - 2 sets - 1x daily - 7x weekly   Standing Pronated Elbow Flexion with Dumbbell - 10 reps - 2 sets - 1x daily - 7x weekly   Shoulder Abduction with Dumbbells - Palms Down - 10 reps - 2 sets - 1x daily - 7x weekly   Shoulder Overhead Press in Abduction with Dumbbells - 10 reps - 2 sets - 1x daily - 7x weekly   Standing Bent Over Triceps Extension - 10 reps - 2 sets - 1x daily - 7x weekly     Access Code: [email or web address]   URL: Enish/   Date: 08/26/2020   Prepared by: Blanchie Mcburney with Resistance - 10 reps - 1 sets - 2x daily - 7x weekly   Shoulder External Rotation with Resistance - 10 reps - 1 sets - 2x daily - 7x weekly   Standing Hip Flexion with Resistance Loop - 10 reps - 2 sets - 1x daily - 7x weekly   Hip Abduction with Resistance Loop - 10 reps - 2 sets - 1x daily - 7x weekly   Hip Extension with Resistance Loop - 10 reps - 2 sets - 1x daily - 7x weekly     Access Code: [email or web address]   URL: MogiMe/   Date: 08/19/2020   Prepared by: Michelle Ponce with Resistance - 10 reps - 1 sets - 2x daily - 7x weekly   Shoulder External Rotation with Resistance - 10 reps - 1 sets - 2x daily - 7x weekly     Access Code: [email or web address]   URL: MogiMe/   Date: 08/14/2020   Prepared by: Kush Wang     Exercises   Seated Long Arc Quad - 10 reps - 3 sets - 1x daily - 7x weekly   Seated March - 10 reps - 3 sets - 1x daily - 7x weekly   Supine Active Straight Leg Raise - 10 reps - 3 sets - 1x daily - 7x weekly     Therapeutic Exercise and NMR EXR  [x] (78511) Provided verbal/tactile cueing for activities related to strengthening, flexibility, endurance, ROM for improvements in  [x] LE / Lumbar: LE, proximal hip, and core control with self care, mobility, lifting, ambulation. [x] UE / Cervical: cervical, postural, scapular, scapulothoracic and UE control with self care, reaching, carrying, lifting, house/yardwork, driving, computer work.  [] (91343) Provided verbal/tactile cueing for activities related to improving balance, coordination, kinesthetic sense, posture, motor skill, proprioception to assist with   [] LE / lumbar: LE, proximal hip, and core control in self care, mobility, lifting, ambulation and eccentric single leg control. [] UE / cervical: cervical, scapular, scapulothoracic and UE control with self care, reaching, carrying, lifting, house/yardwork, driving, computer work.   [] (50453) Therapist is in constant attendance of 2 or more patients providing skilled therapy interventions, but not providing any significant amount of measurable one-on-one time to either patient, for improvements in  [] LE / lumbar: LE, proximal hip, and core control in self care, mobility, lifting, ambulation and eccentric single leg control.    [] UE / cervical: cervical, scapular, scapulothoracic and UE control with self care, reaching, carrying, lifting, house/yardwork, driving, computer work. NMR and Therapeutic Activities:    [x] (49291 or 15050) Provided verbal/tactile cueing for activities related to improving balance, coordination, kinesthetic sense, posture, motor skill, proprioception and motor activation to allow for proper function of   [x] LE: / Lumbar core, proximal hip and LE with self care and ADLs  [] UE / Cervical: cervical, postural, scapular, scapulothoracic and UE control with self care, carrying, lifting, driving, computer work.   [] (61839) Gait Re-education- Provided training and instruction to the patient for proper LE, core and proximal hip recruitment and positioning and eccentric body weight control with ambulation re-education including up and down stairs     Home Management Training / Self Care:  [] (09328) Provided self-care/home management training related to activities of daily living and compensatory training, and/or use of adaptive equipment for improvement with: ADLs and compensatory training, meal preparation, safety procedures and instruction in use of adaptive equipment, including bathing, grooming, dressing, personal hygiene, basic household cleaning and chores.      Home Exercise Program:    [] (78074) Reviewed/Progressed HEP activities related to strengthening, flexibility, endurance, ROM of   [] LE / Lumbar: core, proximal hip and LE for functional self-care, mobility, lifting and ambulation/stair navigation   [] UE / Cervical: cervical, postural, scapular, scapulothoracic and UE control with self care, reaching, carrying, lifting, house/yardwork, driving, computer work  [] (48179)Reviewed/Progressed HEP activities related to improving balance, coordination, kinesthetic sense, posture, motor skill, proprioception of   [] LE: core, proximal hip and LE for self care, mobility, lifting, and ambulation/stair navigation    [] UE / Cervical: cervical, postural,  scapular, scapulothoracic and UE control with self care, reaching, carrying, lifting, house/yardwork, driving, computer work    Manual Treatments:  PROM / STM / Oscillations-Mobs:  G-I, II, III, IV (PA's, Inf., Post.)  [] (19880) Provided manual therapy to mobilize LE, proximal hip and/or LS spine soft tissue/joints for the purpose of modulating pain, promoting relaxation,  increasing ROM, reducing/eliminating soft tissue swelling/inflammation/restriction, improving soft tissue extensibility and allowing for proper ROM for normal function with   [] LE / lumbar: self care, mobility, lifting and ambulation. [] UE / Cervical: self care, reaching, carrying, lifting, house/yardwork, driving, computer work. Modalities:  [] (33763) Vasopneumatic compression: Utilized vasopneumatic compression to decrease edema / swelling for the purpose of improving mobility and quad tone / recruitment which will allow for increased overall function including but not limited to self-care, transfers, ambulation, and ascending / descending stairs. Modalities:    9/3, 8/28, 9/1: MHP to cerv spine x 10 min in supine   9/23: MHP to B shoulders and cervical spine in supine x 15 min (between PT and OT sessions)    Charges:  Timed Code Treatment Minutes: 39   Total Treatment Minutes: 39     [] EVAL - LOW (16857) x   [] EVAL - MOD (21412)  [] EVAL - HIGH (81782)  [] RE-EVAL (35605)  [x] GD(31667) x 2       [] Ionto  [x] NMR (24319) x 1      [] Vaso  [] Manual (03023) x        [] Ultrasound  [] TA x        [] Mech Traction (15945)  [] Aquatic Therapy x     [] ES (un) (07345):   [] Home Management Training x  [] ES(attended) (92906)   [] Dry Needling 1-2 muscles (60971):  [] Dry Needling 3+ muscles (298421)  [] Group:      [] Other:     GOALS:  Patient stated goal: gain strength and endurance     Therapist goals for Patient:   Short Term Goals: To be achieved in: 2 weeks  1. Independent in HEP and progression per patient tolerance, in order to prevent re-injury.    2. Patient will have a decrease in pain to facilitate improvement in movement, function, and ADLs as indicated by Functional Deficits. Long Term Goals: To be achieved in: 6 weeks  1. Pt will increase 30 sec STS reps to at least 8 to demo improvement in endurance. GOAL MET 9/16  2. Patient will increase strength in B UEs to at least 4+/5 and B LEs to at least 4/5 so patient can stand, walk, and complete ADLs independently. Progressing   3. Patient will demonstrate an increase in postural awareness and control to allow for proper functional mobility as indicated by patients Functional Deficits. Progressing   4. Patient will be able to amb for 300 feet or greater without sitting rest break to progress towards PLOF. GOAL MET 9/16  5. Patient will return to functional activities including cooking without increased symptoms or restriction. Not Assessed - Has not tried cooking  6. Patient will improve 6MWT distance to at least 430 m (1400 ft) to demo improved endurance for community ambulation. NEW GOAL Added 9/16     Overall Progression Towards Functional goals/ Treatment Progress Update:  [x] Patient is progressing as expected towards functional goals listed. [] Progression is slowed due to complexities/Impairments listed. [] Progression has been slowed due to co-morbidities. [] Plan just implemented, too soon to assess goals progression <30days   [] Goals require adjustment due to lack of progress  [] Patient is not progressing as expected and requires additional follow up with physician  [] Other    Persisting Functional Limitations/Impairments:  []Sleeping []Sitting               [x]Standing []Transfers        [x]Walking [x]Kneeling               [x]Stairs [x]Squatting / bending   [x]ADLs [x]Reaching  [x]Lifting  [x]Housework  [x]Driving [x]Job related tasks  []Sports/Recreation []Other:        ASSESSMENT:  Pt appears to be recovering very well - has visible muscle gain in B arms. Endurance also greatly improved.  Continues to have poor abdominal strength and endurance, however able to hold isometrics she could not in prior sessions. Plan to continue strength, mobility, and endurance training until all goals met. Treatment/Activity Tolerance:  [] Patient able to complete tx  [x] Patient limited by fatigue  [] Patient limited by pain  [x] Patient limited by other medical complications  [] Other:     Prognosis: [] Good [x] Fair  [] Poor    Patient Requires Follow-up: [x] Yes  [] No    Plan for next treatment session:  Overall strength - UEs, LEs, abdominals as able, endurance training, posture training     PLAN: See eval. PT 2x / week for 6 weeks. [x] Continue per plan of care [] Alter current plan (see comments)  [] Plan of care initiated [] Hold pending MD visit [] Discharge    Electronically signed by: Ebonie Richardson PT, DPT   Note: If patient does not return for scheduled/ recommended follow up visits, this note will serve as a discharge from care along with most recent update on progress.

## 2020-10-28 ENCOUNTER — HOSPITAL ENCOUNTER (OUTPATIENT)
Dept: PHYSICAL THERAPY | Age: 35
Setting detail: THERAPIES SERIES
Discharge: HOME | End: 2020-10-28
Payer: COMMERCIAL

## 2020-10-28 ENCOUNTER — HOSPITAL ENCOUNTER (OUTPATIENT)
Dept: OCCUPATIONAL THERAPY | Age: 35
Setting detail: THERAPIES SERIES
Discharge: HOME | End: 2020-10-28
Payer: COMMERCIAL

## 2020-10-28 PROCEDURE — 97140 MANUAL THERAPY 1/> REGIONS: CPT

## 2020-10-28 PROCEDURE — 97110 THERAPEUTIC EXERCISES: CPT

## 2020-10-28 PROCEDURE — 97530 THERAPEUTIC ACTIVITIES: CPT

## 2020-10-28 PROCEDURE — 97112 NEUROMUSCULAR REEDUCATION: CPT

## 2020-10-28 NOTE — FLOWSHEET NOTE
[facility] - Outpatient Occupational Therapy      Occupational Therapy Daily Treatment Note  Date:  10/28/2020    Patient: Alina Kirk   : 1985   MRN: [id number]  Referring Physician:  Dr. Kirsten Coronado Diagnosis Information:  Dx- physical deconditioning   Due to [facility]                                      Insurance information:  [facility]; ($15 copay, 20 v per year, no estim)  Date of Injury:  Date of Surgery:       Visit # Insurance Allowable Date Range    + 3/8 15/20  8/11 to 10/22       Date of Patient follow up with Physician:     Progress Note: []  Yes  [x]  No  Next due by: Visit #20  Or 20    Latex Allergy:  [x]No      []Yes    Pacemaker:  [x] No       [] Yes     Preferred Language for Healthcare:   [x]English       []other:    Pain level: chronic abdominal discomfort    SUBJECTIVE:   Pt reports she returns to work 2 days/wk starting next Monday, . Pt reports she continues to have neck tightness and fatigue. Pt reports she must rest her head against pillows towards late afternoon/evening due to weakness in holding head up. Pt reports she did not have a shower chair during vacation and was able to bathe without overexertion. Functional Disability Index: Quick DASH: raw score = ; dysfunction = 43%    OBJECTIVE:      Hand Assessment Left  Right  Left 10/22  right    Strength (lbs) 21  : 29 26  : 31 45  10/28: 32 45  10/28: 34   Lateral Pinch Strength (lbs) 6  : 11 8  : 11     3 Jaw Nicholas Pinch Strength (lbs) 6  : 8 9  : 11 9 9   Tip Pinch Strength (lbs) 5  : 6.5 7  : 9     Opposition (Y/N) Y Y         RESTRICTIONS/PRECAUTIONS: lifting restriction of 8#, frail, J peg tubing      Exercises/Interventions: Session initiated with myofascial release and STM of mid and upper traps with pt in supine and decreased tightness following.  Pt then completed intervals of 1 minute on/off x3 of thoracic and neck extension with pt reporting no pain in abdomen, but \"big challenge\" to hold head and chest up from mat. Pt transferred to prone position to complete intervals of 30 seconds on/off x3 for neck and thoracic flexion for upper abdominals with verbal cues to prevent valsalva maneuver. Pt transferred to sit edge of mat to weight bear through UEs, lift hips from mat, and hover for UE strengthening and trunk control. Pt assumed modified plank position on elevated mat on pillow to complete forward slide with physical cue at lower back to maintain hip extension, followed by step forward to pull pillow back with mod difficulty, completed 5 reps. Session concluded with deep squat x5 to lowered mat with improved thoracic extension, hip flexion without forward momentum, and eccentric control noted. Therapeutic Exercises  Resistance / level Sets/sec Reps Notes   Bicep curl2#10            Pronation/supination with full grasp Red flexbar 2 10                                           Manual Intervention                                                                                        Patient education:  EVAL, POC, HEP, Role of OT, Goals- pt verbalized understanding, pt would benefit from visual aid of HEP in future session      Home Exercise Program:  Pt educated to complete the following bodyweight exercises to increase strength and endurance:  1) shoulder flexion  2) shoulder extension  3) elbow flexion   4) elbow extension  5) shoulder abduction  6) shoulder adduction  8/26/20: scapular retraction/protraction x10, 3-5x/day to increase scapular stability and improve posture  8/28/20: theraputty TE; 10 minutes max 2-4x/day  9/1/20: cat/cow and alternating UE reach with LE lift in quadruped  9/29 sit to 1/2 stand, anterior/ posterior pelvic tilt and external shoulder rotation to be completed 3-10 reps pending fatigue.        Therapeutic Exercise and NMR:  [x] (78895) Provided verbal/tactile cueing for activities related to strengthening, flexibility, endurance, ROM  for improvements in scapular, scapulothoracic and UE control with self care, reaching, carrying, lifting, house/yardwork, driving/computer work. [x] (01329) Provided verbal/tactile cueing for activities related to improving balance, coordination, kinesthetic sense, posture, motor skill, proprioception  to assist with  scapular, scapulothoracic and UE control with self care, reaching, carrying, lifting, house/yardwork, driving/computer work.   [] Comments:    Therapeutic Activities:    [x] (41330 or 81899) Provided verbal/tactile cueing for activities related to improving balance, coordination, kinesthetic sense, posture, motor skill, proprioception and motor activation to allow for proper function of scapular, scapulothoracic and UE control with self care, carrying, lifting, driving/computer work  [] Comments:    Home Exercise Program:    [] (90986) Reviewed/Progressed HEP activities related to strengthening, flexibility, endurance, ROM of scapular, scapulothoracic and UE control with self care, reaching, carrying, lifting, house/yardwork, driving/computer work  [] (31825) Reviewed/Progressed HEP activities related to improving balance, coordination, kinesthetic sense, posture, motor skill, proprioception of scapular, scapulothoracic and UE control with self care, reaching, carrying, lifting, house/yardwork, driving/computer work    [] Comments:    Manual Treatments:  PROM / STM / Oscillations-Mobs:  G-I, II, III, IV (PA's, Inf., Post.)  [x] (08504) Provided manual therapy to mobilize soft tissue/joints of cervical/CT, scapular GHJ and UE for the purpose of modulating pain, promoting relaxation,  increasing ROM, reducing/eliminating soft tissue swelling/inflammation/restriction, improving soft tissue extensibility and allowing for proper ROM for normal function with self care, reaching, carrying, lifting, house/yardwork, driving/computer work  [] Comments:    ADL Training:  [] (74571) Provided self-care/home management training related to activities of daily living and compensatory training, and/or use of adaptive equipment   [] Comments:     Splinting:  [] Fabrication of:   [] (14786) Orthotic/Prosthetic Management, subsequent encounter  [] (57154) Orthotic management and training (fitting and assessment)  [] Comments:    Modalities:     Charges:  Timed Code Treatment Minutes: 45   Total Treatment Minutes: 45     [] EVAL (LOW) 95566   [] OT Re-eval (42263)  [] EVAL (MOD) 40342   [] EVAL (HIGH) 13284       [x] Mariam (18467) x    [] HCXNP(71443)  [] NMR (45311) x     [] Estim (attended) (29646)   [x] Manual ([id number]) x  1   [] US (36329)  [x] TA (84711) x  1   [] Paraffin (20340)  [] ADL  ([phone]) x    [] Splint/L code:    [] Estim (unattended) (22 395383)  [] Fluidotherapy (39955)  [] Other:     GOALS:  1) Patient stated goal: To gain strength and activity tolerance needed to return back home independently. []? Progressing: []? Met: []? Not Met: [x]? Adjusted   9/21/20: Pt has sold her home to live with her parents indefinitely due to continued medical issues and nutrition- goal ceased at this time. 2) Patient stated goal: To complete bathing in stance with no use of shower chair  [x]? Progressing: []? Met: []? Not Met: []? Adjusted   9/21/20: near complete; pt required shower chair for final few minutes of most recent shower     Therapist goals for Patient:   Short Term Goals: To be achieved in: 30 days  1. Pt will increase  strength in B hands by 10 lbs by 9/14 to increase functional capacity for ADL/IADL tasks  [x]? Progressing: []? Met: []? Not Met: []? Adjusted  2. Pt will demo increased activity tolerance and safety needed to complete cooking task at independent level by 9/14  [x]? Progressing: []? Met: []? Not Met: []? Adjusted  3. Pt will demo increase shoulder strength as evidence by ability to complete cleaning task with use of broom or vacuum by 9/14  [x]? Progressing: []? Met: []?  Not Met: []? Adjusted     Long Term Goals: To be achieved by polly  1. Pt will will report a QuickDASH Symptom Severity Scale score of 30% or less indicating increased safety and functional independence in desired occupational pursuits by discharge. [x]? Progressing: []? Met: []? Not Met: []? Adjusted    Progression Towards Functional goals:  [x] Patient is progressing as expected towards functional goals listed. [] Progression is slowed due to complexities listed. [] Progression has been slowed due to co-morbidities. [] Plan just implemented, too soon to assess goals progression  [] All goals are met  [] Other:     ASSESSMENT:  See eval    Treatment/Activity Tolerance:  [x] Patient tolerated treatment well [] Patient limited by fatique  [] Patient limited by pain  [] Patient limited by other medical complications  [] Other:     Prognosis: [x] Good [] Fair  [] Poor    Patient Requires Follow-up: [x] Yes  [] No    PLAN: See eval  [x] Continue per plan of care [] Alter current plan (see comments)  [] Plan of care initiated [] Hold pending MD visit [] Discharge    Electronically signed by:    Kelly Alvarez OTR/L [id number]        Note: If patient does not return for scheduled/ recommended follow up visits, this note will serve as a discharge from care along with most recent update on progress.

## 2020-10-28 NOTE — FLOWSHEET NOTE
[address] [facility] Physical Therapy  Phone: [phone]   Fax: [phone]    Physical Therapy Daily Treatment Note  Date:  10/28/2020    Patient Name:  Yasmine Gibbons    :  1985  MRN: [id number]  Medical/Treatment Diagnosis Information:  · Diagnosis: physical deconditioning  Treatment Diagnosis: decreased B UE and LE stretch, poor endurance, impaired posture, increased muscle tension through R cervicothoracic region, spinal malalignment  Insurance/Certification information:  PT Insurance Information: [facility] ($15 copay, 20 v per year no estim)  Physician Information:  Referring Practitioner:  - Pt provided information for surgeon who is overseeing all care now:  Dr. Samantha Flores  [address] [facility]  Phone: [phone]  Fax: [phone]  Plan of care signed (Y/N): []  Yes [x]  No     Date of Patient follow up with Physician: ?     Progress Report: []  Yes  [x]  No     Date Range for reporting period:  Beginnin2020  Endin/16    Progress report due (10 Rx/or 30 days whichever is less): visit #61      Recertification due (POC duration/ or 90 days whichever is less): 2020     Visit # Insurance Allowable Auth required? Date Range     +  20 []  Yes  [x]  No n/a     Latex Allergy:  [x]NO      []YES  Preferred Language for Healthcare:   [x]English       []other:    Functional Scale:        Date assessed:  30 sec STS: raw score 5 reps                                           2020    30 sec STS: raw score 9 reps                                           2020    Pain level:  5-6/10     SUBJECTIVE: Pt reports just her normal pain today. She is still waking up in the middle night because she is so tight. She is down to 3 bottles through her J tube every other night. She is getting nutrition bloodwork every ~3 weeks.       OBJECTIVE:   :  Strength (0-5) Left Right   Shoulder flexion 4 4   Shoulder abd 4- 4*   Shoulder ER 4 4+   Shoulder IR 4 4        Seated hip flexion 4- 4-   Seated hip ER 4- 4-   Seated hip IR 4- 4-   Knee flexion 4 4   Knee ext 4 4   Ankle DF 4+ 4+            * = painful         Orthopaedic Special Tests  Positive  Negative  NT Comments    30 sec STS        9 reps   sahrmann level 0.5       Able to complete    sahrmann level 1a       Unable to keep back on table    6 MWT    1166 ft / 36 m       RESTRICTIONS/PRECAUTIONS: lifting restriction of 8#, frail, J peg tubing     Exercises/Interventions:    Therapeutic Exercises (03327) Resistance / level Sets/sec Reps Notes   Nustep  Step one   Bike    TM forward     1.4-1.8 MPH X 5 min     IB  HR/TR     LAQ  Seated march     sarhamnn lower ab strength          deadbug position iso holds  SB lift with knees flexed    Prone press up (mini due to tube)  Leg ext in prone  Prone HS curls  Prone ext with knee bent      3     2      1      1  1  1      10 B      x10      x10 B  x10 B  X 10 B   Mat therex:  · Supine SLR  · SAQ  · Hip Add squeezes  · Hooklying clamshells   · Supine shoulder ER stretch         Supine with 2 pillows          Done after CT   Seated therex:  · Rows with TB loop  · B ER with TB loop    Seated on green SB:  Mid rows  LPD  High rows  Ant/post tilts  Lateral tilts  cw and ccw circles   OH lifts  Trunk twists  Chops Small Green medi ball  \"   First set with Edwards TB   Standing therex:  · Hip 3 way  · FWD step ups  · Lateral step ups  · Ham curl  · Squats  · High knee march  · Hip abd   · Lateral band steps   May need orange TB at home   No UE assist, done in // bars   No UE assist, done in // bars  B UE support  B UE support  B UE support   B UE support, small distance to stay close to bag   Gliders - retro     Gliders - lateral     Free weights:  Bicep curl  Hammer curl  Palm down curl   Lat raises   OH press   Tricep kick back   3#  3#  3#  3#  3#  3# 9/9:  Done Standing    SB rolls on wall   SB rainbows on wall  Red  Red Small lean fwd for incr stretch   Cables:  Mid rows   LPD  High to low chop  High rows  Hip ext   Hip abd   Hip flexion   3 pl  3 pl  3 pl  3 pl   22  2  [id number]  x10 B  X 10 B    Sidebending stretch with swiss ball  Discontinued - caused pressure in side of abdomen    Swiss ball extension stretch     TRX:  · Rows  · Squats  · Y's  · Lunges                    Therapeutic Activities (62677)       Posture with 1/2 foam roll  · CT with scap squeeze  · B ER     Head position                            Neuromuscular Re-ed (85067)       Chin tucks in supine     Weighted walkouts with waist strap Fwd, retro, R, L   Tiltboard taps - A/P, M/L    Tiltboard DLS - A/P, M/L    Bosu lunges on blue side Light fingertips for balance    Bosu - black side  · Mini squats  · Moguls    CC paloff press    Lat stretch at ballet barre   ER stretch on wall   UT/LS stretch on ballet barre   Thread the needle at ballet barre  Push ups at ballet barre  30 sec    30 sec  1  1 x3    X2 B  X 10 B  X 10     VCs to side bend and rot away from barre   Foam roll along scaps    Vertically and horizontal           Manual Intervention (50030)       cervical stretching into L SBing and L rotation, rotational mobs grade 1 to correct L upper t spine rotations, gentle cervical traction in neutral, DTM to L cervical paraspinals       Gentle cervical traction in neutral, STM to B UTs, cervical paraspinals and LS, cervical stretching into L SBing and L rotation, SOR,         Gentle cervical traction in neutral, STM to B UTs, manual stretching of UTs, LS, and full flexion with rotation, SOR       PROM into SBing and rot, grade 2 PA mobs through c spine, DTM to R UT and scalenes, SOR, manual traction in neutral gently, side glides B grade 3    8/31: Increased tightness when SBing and rot to R   DTM to B UT, SOR, STM to cervical paraspinals, manual traction, PROM S + rot, OA nods, grade 2 PA mobs through mid to low C-spine        PROM into SBing and rot in neutral and [email or web address]/   Date: 08/19/2020   Prepared by: Rosalba Kim with Resistance - 10 reps - 1 sets - 2x daily - 7x weekly   Shoulder External Rotation with Resistance - 10 reps - 1 sets - 2x daily - 7x weekly     Access Code: [email or web address]   URL: [employer]. [employer]/   Date: 08/14/2020   Prepared by: Luz Calles     Exercises   Seated Long Arc Quad - 10 reps - 3 sets - 1x daily - 7x weekly   Seated March - 10 reps - 3 sets - 1x daily - 7x weekly   Supine Active Straight Leg Raise - 10 reps - 3 sets - 1x daily - 7x weekly     Therapeutic Exercise and NMR EXR  [x] (58803) Provided verbal/tactile cueing for activities related to strengthening, flexibility, endurance, ROM for improvements in  [x] LE / Lumbar: LE, proximal hip, and core control with self care, mobility, lifting, ambulation. [x] UE / Cervical: cervical, postural, scapular, scapulothoracic and UE control with self care, reaching, carrying, lifting, house/yardwork, driving, computer work.  [] (99303) Provided verbal/tactile cueing for activities related to improving balance, coordination, kinesthetic sense, posture, motor skill, proprioception to assist with   [] LE / lumbar: LE, proximal hip, and core control in self care, mobility, lifting, ambulation and eccentric single leg control. [] UE / cervical: cervical, scapular, scapulothoracic and UE control with self care, reaching, carrying, lifting, house/yardwork, driving, computer work.   [] (19090) Therapist is in constant attendance of 2 or more patients providing skilled therapy interventions, but not providing any significant amount of measurable one-on-one time to either patient, for improvements in  [] LE / lumbar: LE, proximal hip, and core control in self care, mobility, lifting, ambulation and eccentric single leg control.    [] UE / cervical: cervical, scapular, scapulothoracic and UE control with self care, reaching, carrying, lifting, house/yardwork, driving, computer work. NMR and Therapeutic Activities:    [x] (88944 or 18696) Provided verbal/tactile cueing for activities related to improving balance, coordination, kinesthetic sense, posture, motor skill, proprioception and motor activation to allow for proper function of   [x] LE: / Lumbar core, proximal hip and LE with self care and ADLs  [] UE / Cervical: cervical, postural, scapular, scapulothoracic and UE control with self care, carrying, lifting, driving, computer work.   [] (51869) Gait Re-education- Provided training and instruction to the patient for proper LE, core and proximal hip recruitment and positioning and eccentric body weight control with ambulation re-education including up and down stairs     Home Management Training / Self Care:  [] (58784) Provided self-care/home management training related to activities of daily living and compensatory training, and/or use of adaptive equipment for improvement with: ADLs and compensatory training, meal preparation, safety procedures and instruction in use of adaptive equipment, including bathing, grooming, dressing, personal hygiene, basic household cleaning and chores.      Home Exercise Program:    [] (51763) Reviewed/Progressed HEP activities related to strengthening, flexibility, endurance, ROM of   [] LE / Lumbar: core, proximal hip and LE for functional self-care, mobility, lifting and ambulation/stair navigation   [] UE / Cervical: cervical, postural, scapular, scapulothoracic and UE control with self care, reaching, carrying, lifting, house/yardwork, driving, computer work  [] (04093)Reviewed/Progressed HEP activities related to improving balance, coordination, kinesthetic sense, posture, motor skill, proprioception of   [] LE: core, proximal hip and LE for self care, mobility, lifting, and ambulation/stair navigation    [] UE / Cervical: cervical, postural,  scapular, scapulothoracic and UE control with self care, reaching, carrying, lifting, house/yardwork, driving, computer work    Manual Treatments:  PROM / STM / Oscillations-Mobs:  G-I, II, III, IV (PA's, Inf., Post.)  [] (75574) Provided manual therapy to mobilize LE, proximal hip and/or LS spine soft tissue/joints for the purpose of modulating pain, promoting relaxation,  increasing ROM, reducing/eliminating soft tissue swelling/inflammation/restriction, improving soft tissue extensibility and allowing for proper ROM for normal function with   [] LE / lumbar: self care, mobility, lifting and ambulation. [] UE / Cervical: self care, reaching, carrying, lifting, house/yardwork, driving, computer work. Modalities:  [] (82917) Vasopneumatic compression: Utilized vasopneumatic compression to decrease edema / swelling for the purpose of improving mobility and quad tone / recruitment which will allow for increased overall function including but not limited to self-care, transfers, ambulation, and ascending / descending stairs. Modalities:    9/3, 8/28, 9/1: MHP to cerv spine x 10 min in supine   9/23: MHP to B shoulders and cervical spine in supine x 15 min (between PT and OT sessions)    Charges:  Timed Code Treatment Minutes: 42   Total Treatment Minutes: 42     [] EVAL - LOW (42765) x   [] EVAL - MOD (09369)  [] EVAL - HIGH (07582)  [] RE-EVAL (57422)  [x] WS(68873) x 2       [] Ionto  [x] NMR (21356) x 1      [] Vaso  [] Manual (18409) x        [] Ultrasound  [] TA x        [] Mech Traction (64181)  [] Aquatic Therapy x     [] ES (un) (97075):   [] Home Management Training x  [] ES(attended) (31713)   [] Dry Needling 1-2 muscles (18947):  [] Dry Needling 3+ muscles (662001)  [] Group:      [] Other:     GOALS:  Patient stated goal: gain strength and endurance     Therapist goals for Patient:   Short Term Goals: To be achieved in: 2 weeks  1. Independent in HEP and progression per patient tolerance, in order to prevent re-injury.    2. Patient will have a decrease in pain to facilitate improvement in movement, function, and ADLs as indicated by Functional Deficits. Long Term Goals: To be achieved in: 6 weeks  1. Pt will increase 30 sec STS reps to at least 8 to demo improvement in endurance. GOAL MET 9/16  2. Patient will increase strength in B UEs to at least 4+/5 and B LEs to at least 4/5 so patient can stand, walk, and complete ADLs independently. Progressing   3. Patient will demonstrate an increase in postural awareness and control to allow for proper functional mobility as indicated by patients Functional Deficits. Progressing   4. Patient will be able to amb for 300 feet or greater without sitting rest break to progress towards PLOF. GOAL MET 9/16  5. Patient will return to functional activities including cooking without increased symptoms or restriction. Not Assessed - Has not tried cooking  6. Patient will improve 6MWT distance to at least 430 m (1400 ft) to demo improved endurance for community ambulation. NEW GOAL Added 9/16     Overall Progression Towards Functional goals/ Treatment Progress Update:  [x] Patient is progressing as expected towards functional goals listed. [] Progression is slowed due to complexities/Impairments listed. [] Progression has been slowed due to co-morbidities. [] Plan just implemented, too soon to assess goals progression <30days   [] Goals require adjustment due to lack of progress  [] Patient is not progressing as expected and requires additional follow up with physician  [] Other    Persisting Functional Limitations/Impairments:  []Sleeping []Sitting               [x]Standing []Transfers        [x]Walking [x]Kneeling               [x]Stairs [x]Squatting / bending   [x]ADLs [x]Reaching  [x]Lifting  [x]Housework  [x]Driving [x]Job related tasks  []Sports/Recreation []Other:        ASSESSMENT:  Pt overall making improvements.  Her endurance with abdominal strengthening is improved and she is able to complete a set of 10 for exercises without taking a break while maintaining spinal neutral. Will continue to progress strengthening as tolerated. Treatment/Activity Tolerance:  [] Patient able to complete tx  [x] Patient limited by fatigue  [] Patient limited by pain  [x] Patient limited by other medical complications  [] Other:     Prognosis: [] Good [x] Fair  [] Poor    Patient Requires Follow-up: [x] Yes  [] No    Plan for next treatment session:  Overall strength - UEs, LEs, abdominals as able, endurance training, posture training     PLAN: See eval. PT 2x / week for 6 weeks. [x] Continue per plan of care [] Alter current plan (see comments)  [] Plan of care initiated [] Hold pending MD visit [] Discharge    Electronically signed by: Renata Rausch PT, DPT   Note: If patient does not return for scheduled/ recommended follow up visits, this note will serve as a discharge from care along with most recent update on progress.

## 2020-11-04 ENCOUNTER — APPOINTMENT (OUTPATIENT)
Dept: OCCUPATIONAL THERAPY | Age: 35
End: 2020-11-04
Payer: COMMERCIAL

## 2020-11-04 ENCOUNTER — APPOINTMENT (OUTPATIENT)
Dept: PHYSICAL THERAPY | Age: 35
End: 2020-11-04
Payer: COMMERCIAL

## 2020-11-05 ENCOUNTER — HOSPITAL ENCOUNTER (OUTPATIENT)
Dept: PHYSICAL THERAPY | Age: 35
Setting detail: THERAPIES SERIES
Discharge: HOME | End: 2020-11-05
Payer: COMMERCIAL

## 2020-11-05 ENCOUNTER — HOSPITAL ENCOUNTER (OUTPATIENT)
Dept: OCCUPATIONAL THERAPY | Age: 35
Setting detail: THERAPIES SERIES
Discharge: HOME | End: 2020-11-05
Payer: COMMERCIAL

## 2020-11-05 PROCEDURE — 97112 NEUROMUSCULAR REEDUCATION: CPT

## 2020-11-05 PROCEDURE — 97110 THERAPEUTIC EXERCISES: CPT

## 2020-11-05 NOTE — PLAN OF CARE
[facility] Occupational Therapy      Outpatient Occupational Therapy  Phone: [phone] Fax: [phone]     To:   Dr. Jeannie Barnes       Patient: Pedro Sheikh  : 1985  MRN: [id number]  Evaluation Date: 2020      Diagnosis Information:            Occupational Therapy Certification/Re-Certification Form  Dear Dr. Michael Sneed  The following patient has been evaluated for occupational therapy services and for therapy to continue, Medicare requires monthly physician review of the treatment plan. Please review the attached evaluation and/or summary of the patient's plan of care, and verify that you agree therapy should continue by signing the attached document and sending it back to our office. Plan of Care/Treatment to date:  [x] Therapeutic Exercise   [] Modalities:  [x] Therapeutic Activity    [] Ultrasound  [] Electrical Stimulation   [x] Activities of Daily Living    [] Fluidotherapy [] Kinesiotaping  [] Neuromuscular Re-education   [] Iontophoresis [] Coldpack/hotpack   [x] Instruction in HEP     [] Contrast Bath  [] Manual Therapy     Other:  [] Aquatic Therapy      []       Frequency/Duration:  # Days per week: [x] 1 day # Weeks: [] 1 week [] 5 weeks      [] 2 days   [] 2 weeks [] 6 weeks     [] 3 days   [x] 3 weeks [] 7 weeks     [] 4 days   [] 4 weeks [] 8 weeks    Rehab Potential: [] excellent [x] good [] fair  [] poor       Electronically signed by:  Sumaya Alvarado OT        If you have any questions or concerns, please don't hesitate to call.   Thank you for your referral.      Physician Signature:________________________________Date:__________________  By signing above, therapists plan is approved by physician      Occupational Therapy Daily Treatment Note  Date:  2020    Patient: Pedro Sheikh   : 1985   MRN: [id number]  Referring Physician:  Dr. Abida Pinto Diagnosis Information:  Dx- physical deconditioning   Due to CF                                      Insurance information:  [facility]; ($15 copay, 20 v per year, no estim)  Date of Injury:  Date of Surgery:       Visit # Insurance Allowable Date Range   12/12 + 5/8 17/20 8/11 to 10/22       Date of Patient follow up with Physician:     Progress Note: []  Yes  [x]  No  Next due by: Visit #20  Or 11/22/20    Latex Allergy:  [x]No      []Yes    Pacemaker:  [x] No       [] Yes     Preferred Language for Healthcare:   [x]English       []other:    Pain level: chronic abdominal discomfort    SUBJECTIVE:   Pt reports she returns to work 2 days/wk starting next Monday, 11/2. Pt reports she continues to have neck tightness and fatigue. Pt reports she must rest her head against pillows towards late afternoon/evening due to weakness in holding head up. Pt reports she did not have a shower chair during vacation and was able to bathe without overexertion. Functional Disability Index: Quick DASH: raw score = ; dysfunction = 43%    OBJECTIVE:      Hand Assessment Left  Right  Left 10/22  right    Strength (lbs) 21  9/21: 29 26  9/21: 31 45  10/28: 32 45  10/28: 34   Lateral Pinch Strength (lbs) 6  9/21: 11 8  9/21: 11     3 Jaw Nicholas Pinch Strength (lbs) 6  9/21: 8 9  9/21: 11 9 9   Tip Pinch Strength (lbs) 5  9/21: 6.5 7  9/21: 9     Opposition (Y/N) Y Y         RESTRICTIONS/PRECAUTIONS: lifting restriction of 8#, frail, J peg tubing      Exercises/Interventions: Session initiated with 4 minutes on UBE followed by myofascial release and STM of mid and upper traps with pt in supine and decreased tightness. Patient then assumed supine on 1/2 pool noodle for facilitation of trunk stabilizers. Patient with ues in neutral position slightly abducted at side. Patient worked on stabilizing on noodle with legs and hip in bridge position.   Patient then bridged times 10 reps followed by peeling one leg up from mat times 10 reps while stabilizing on noodle and then reciprical pattern picking on right ue and lifting left le and visa versa times 10 reps each. Patient then worked on big reaches to opposing sides in short sit while maintaining good trunk alignment and lengthening times 10 reps each direction. Patient then completed modified plank hold on wall on elbows with difficulty needing physical cues. [facility] and worked on eccentric control to modified plank on forearms transitioning back to quadriped times 7 reps. Fatigued after session. Therapeutic Exercises  Resistance / level Sets/sec Reps Notes   Bicep curl2#10            Pronation/supination with full grasp Red flexbar 2 10                                           Manual Intervention                                                                                        Patient education:  EVAL, POC, HEP, Role of OT, Goals- pt verbalized understanding, pt would benefit from visual aid of HEP in future session      Home Exercise Program:  Pt educated to complete the following bodyweight exercises to increase strength and endurance:  1) shoulder flexion  2) shoulder extension  3) elbow flexion   4) elbow extension  5) shoulder abduction  6) shoulder adduction  8/26/20: scapular retraction/protraction x10, 3-5x/day to increase scapular stability and improve posture  8/28/20: theraputty TE; 10 minutes max 2-4x/day  9/1/20: cat/cow and alternating UE reach with LE lift in quadruped  9/29 sit to 1/2 stand, anterior/ posterior pelvic tilt and external shoulder rotation to be completed 3-10 reps pending fatigue. Therapeutic Exercise and NMR:  [x] (17406) Provided verbal/tactile cueing for activities related to strengthening, flexibility, endurance, ROM  for improvements in scapular, scapulothoracic and UE control with self care, reaching, carrying, lifting, house/yardwork, driving/computer work.     [x] (12852) Provided verbal/tactile cueing for activities related to improving balance, coordination, kinesthetic sense, posture, motor skill, proprioception  to assist with  scapular, scapulothoracic and UE control with self care, reaching, carrying, lifting, house/yardwork, driving/computer work.   [] Comments:    Therapeutic Activities:    [x] (86075 or 67340) Provided verbal/tactile cueing for activities related to improving balance, coordination, kinesthetic sense, posture, motor skill, proprioception and motor activation to allow for proper function of scapular, scapulothoracic and UE control with self care, carrying, lifting, driving/computer work  [] Comments:    Home Exercise Program:    [] (00149) Reviewed/Progressed HEP activities related to strengthening, flexibility, endurance, ROM of scapular, scapulothoracic and UE control with self care, reaching, carrying, lifting, house/yardwork, driving/computer work  [] (33846) Reviewed/Progressed HEP activities related to improving balance, coordination, kinesthetic sense, posture, motor skill, proprioception of scapular, scapulothoracic and UE control with self care, reaching, carrying, lifting, house/yardwork, driving/computer work    [] Comments:    Manual Treatments:  PROM / STM / Oscillations-Mobs:  G-I, II, III, IV (PA's, Inf., Post.)  [x] (34952) Provided manual therapy to mobilize soft tissue/joints of cervical/CT, scapular GHJ and UE for the purpose of modulating pain, promoting relaxation,  increasing ROM, reducing/eliminating soft tissue swelling/inflammation/restriction, improving soft tissue extensibility and allowing for proper ROM for normal function with self care, reaching, carrying, lifting, house/yardwork, driving/computer work  [] Comments:    ADL Training:  [] (49398) Provided self-care/home management training related to activities of daily living and compensatory training, and/or use of adaptive equipment   [] Comments:     Splinting:  [] Fabrication of:   [] (81433) Orthotic/Prosthetic Management, subsequent encounter  [] (34160) Orthotic management and training (fitting and assessment)  [] Comments:    Modalities:     Charges:  Timed Code Treatment Minutes: 45   Total Treatment Minutes: 45     [] EVAL (LOW) 01337   [] OT Re-eval (12960)  [] EVAL (MOD) 03019   [] EVAL (HIGH) 56081       [x] Mariam (12138) x    [] CNHQG(44489)  [] NMR (41315) x     [] Estim (attended) (65059)   [x] Manual ([id number]) x  1   [] US (02066)  [x] TA (88756) x  1   [] Paraffin (48678)  [] ADL  (53241) x    [] Splint/L code:    [] Estim (unattended) ([id number])  [] Fluidotherapy (67374)  [] Other:     GOALS:  1) Patient stated goal: To gain strength and activity tolerance needed to return back home independently. []? Progressing: []? Met: []? Not Met: [x]? Adjusted   9/21/20: Pt has sold her home to live with her parents indefinitely due to continued medical issues and nutrition- goal ceased at this time. 2) Patient stated goal: To complete bathing in stance with no use of shower chair  []? Progressing: [x]? Met: []? Not Met: []? Adjusted   9/21/20: near complete; pt required shower chair for final few minutes of most recent shower     Therapist goals for Patient:   Short Term Goals: To be achieved in: 30 days  1. Pt will increase  strength in B hands by 10 lbs by 9/14 to increase functional capacity for ADL/IADL tasks  []? Progressing: [x]? Met: []? Not Met: []? Adjusted  2. Pt will demo increased activity tolerance and safety needed to complete cooking task at independent level by 9/14  []? Progressing: [x]? Met: []? Not Met: []? Adjusted  3. Pt will demo increase shoulder strength as evidence by ability to complete cleaning task with use of broom or vacuum by 9/14  [x]? Progressing: []? Met: []? Not Met: []? Adjusted     Long Term Goals: To be achieved by polly  1. Pt will will report a QuickDASH Symptom Severity Scale score of 30% or less indicating increased safety and functional independence in desired occupational pursuits by discharge. [x]? Progressing: []?  Met: []? Not Met: []? Adjusted    Progression Towards Functional goals:  [x] Patient is progressing as expected towards functional goals listed. [] Progression is slowed due to complexities listed. [] Progression has been slowed due to co-morbidities. [] Plan just implemented, too soon to assess goals progression  [] All goals are met  [] Other:     ASSESSMENT:  See eval    Treatment/Activity Tolerance:  [x] Patient tolerated treatment well [] Patient limited by fatique  [] Patient limited by pain  [] Patient limited by other medical complications  [] Other:     Prognosis: [x] Good [] Fair  [] Poor    Patient Requires Follow-up: [x] Yes  [] No    PLAN: See eval  [x] Continue per plan of care [] Alter current plan (see comments)  [] Plan of care initiated [] Hold pending MD visit [] Discharge    Electronically signed by:              Note: If patient does not return for scheduled/ recommended follow up visits, this note will serve as a discharge from care along with most recent update on progress.

## 2020-11-05 NOTE — FLOWSHEET NOTE
flexion 4 4   Shoulder abd 4- 4*   Shoulder ER 4 4+   Shoulder IR 4 4        Seated hip flexion 4- 4-   Seated hip ER 4- 4-   Seated hip IR 4- 4-   Knee flexion 4 4   Knee ext 4 4   Ankle DF 4+ 4+            * = painful         Orthopaedic Special Tests  Positive  Negative  NT Comments    30 sec STS        9 reps   sahrmann level 0.5       Able to complete    sahrmann level 1a       Unable to keep back on table    6 MWT    1166 ft / 36 m       RESTRICTIONS/PRECAUTIONS: lifting restriction of 8#, frail, J peg tubing     Exercises/Interventions:    Therapeutic Exercises (45961) Resistance / level Sets/sec Reps Notes   Nustep  Step one   Bike    TM forward    IB  HR/TR    LAQ  Seated march    sarhamnn lower ab strength          deadbug position iso holds  SB lift with knees flexed    Prone press up (mini due to tube)  Leg ext in prone  Prone HS curls  Prone ext with knee bent       Mat therex:  · Supine SLR  · SAQ  · Hip Add squeezes  · Hooklying clamshells   · Supine shoulder ER stretch         Supine with 2 pillows          Done after CT   Seated therex:  · Rows with TB loop  · B ER with TB loop    Seated on green SB:  Mid rows  LPD  High rows  Ant/post tilts  Lateral tilts  cw and ccw circles   OH lifts  Trunk twists  Chops Small Green medi ball  \"   First set with [place] TB   Standing therex:  · Hip 3 way  · FWD step ups  · Lateral step ups  · Ham curl  · Squats  · High knee march  · Hip abd   · Lateral band steps   May need orange TB at home   No UE assist, done in // bars   No UE assist, done in // bars  B UE support  B UE support  B UE support   B UE support, small distance to stay close to bag   Gliders - retro     Gliders - lateral     Free weights:  Bicep curl  Hammer curl  Palm down curl   Lat raises   OH press   Tricep kick back 9/9:  Done Standing    SB rolls on wall   SB rainbows on wall  Small lean fwd for incr stretch   Cables:  Mid rows   LPD  High to low chop  High rows  Hip ext   Hip abd   Hip flexion  Low to high chop   abdominal rot      2 pl  1 pl 2  2 x10 B  x10 B    Sidebending stretch with swiss ball  Discontinued - caused pressure in side of abdomen    Swiss ball extension stretch     TRX:  · Rows  · Squats  · Y's  · Lunges      [facility]  Chest press  LPD  Shoulder press  TRX rows  TRX Ys  Free weight bicep curl  Free weight tricep kick back  8# forward lunge walk   6# slam ball OH to floor     elliptical      10#  20#  10#      3#  3#        Level 1      1  1  1  1  1  1  1  1  1     x10  x10  x10  x10  x10  x10  x10  x10  x10    X  5 min            Therapeutic Activities (18221)       Posture with 1/2 foam roll  · CT with scap squeeze  · B ER     Head position                            Neuromuscular Re-ed (28346)       Chin tucks in supine     Weighted walkouts with waist strap Fwd, retro, R, L   Tiltboard taps - A/P, M/L    Tiltboard DLS - A/P, M/L    Bosu lunges on blue side Light fingertips for balance    Bosu - black side  · Mini squats  · Moguls    CC paloff press    Lat stretch at ballet barre   ER stretch on wall   UT/LS stretch on ballet barre   Thread the needle at ballet barre  Push ups at ballet barre      VCs to side bend and rot away from barre   Foam roll along scaps    Vertically and horizontal    theracane to R UT    X 3 min    lumb stretch into flexion on ballet barre   X 2 min    UT stretch  LS stretch   1 min  1 min  X 1 B  X 1 B                  Manual Intervention (06290)       cervical stretching into L SBing and L rotation, rotational mobs grade 1 to correct L upper t spine rotations, gentle cervical traction in neutral, DTM to L cervical paraspinals       Gentle cervical traction in neutral, STM to B UTs, cervical paraspinals and LS, cervical stretching into L SBing and L rotation, SOR,         Gentle cervical traction in neutral, STM to B UTs, manual stretching of UTs, LS, and full flexion with rotation, SOR       PROM into SBing and rot, grade 2 PA mobs through c spine, DTM to R UT and scalenes, SOR, manual traction in neutral gently, side glides B grade 3    8/31: Increased tightness when SBing and rot to R   DTM to B UT, SOR, STM to cervical paraspinals, manual traction, PROM S + rot, OA nods, grade 2 PA mobs through mid to low C-spine        PROM into SBing and rot in neutral and flexion,   manual traction in neutral gently, DTM to B UT and LS             Pt. Education:  11/5: issued HO of [facility] page for theracane     8/14: patient educated on diagnosis, prognosis and expectations for rehab, all patient questions were answered; stressed importance of taking breaks when needed and completing HEP each day in short bouts; encouraged pt to find head neutral every time she passes a mirror to avoid holding head towards the right     Home Exercise Program:  Access Code: [email or web address]   URL: [email or web address]   Date: 09/23/2020   Prepared by: Gustavo Morrow     Exercises   Supine 90/90 Alternating Toe Touch - 10 reps - 3 sets - 1x daily - 7x weekly   Modified Supine 90/90 Leg Extensions - 10 reps - 3 sets - 1x daily - 7x weekly     Access Code: [email or web address]   URL: [employer]/   Date: 09/14/2020   Prepared by: Gustavo Morrow   + lime TB  Exercises   Standing Bicep Curls Supinated with Dumbbells - 10 reps - 2 sets - 1x daily - 7x weekly   Standing Bicep Curls Neutral with Dumbbells - 10 reps - 2 sets - 1x daily - 7x weekly   Standing Pronated Elbow Flexion with Dumbbell - 10 reps - 2 sets - 1x daily - 7x weekly   Shoulder Abduction with Dumbbells - Palms Down - 10 reps - 2 sets - 1x daily - 7x weekly   Shoulder Overhead Press in Abduction with Dumbbells - 10 reps - 2 sets - 1x daily - 7x weekly   Standing Bent Over Triceps Extension - 10 reps - 2 sets - 1x daily - 7x weekly     Access Code: [email or web address]   URL: [employer]/   Date: 08/26/2020   Prepared by: Gustavo Morrow     Exercises   Hooklying Clamshell with Resistance - 10 reps - 1 sets - 2x daily - interventions, but not providing any significant amount of measurable one-on-one time to either patient, for improvements in  [] LE / lumbar: LE, proximal hip, and core control in self care, mobility, lifting, ambulation and eccentric single leg control. [] UE / cervical: cervical, scapular, scapulothoracic and UE control with self care, reaching, carrying, lifting, house/yardwork, driving, computer work. NMR and Therapeutic Activities:    [x] (41587 or 28976) Provided verbal/tactile cueing for activities related to improving balance, coordination, kinesthetic sense, posture, motor skill, proprioception and motor activation to allow for proper function of   [x] LE: / Lumbar core, proximal hip and LE with self care and ADLs  [] UE / Cervical: cervical, postural, scapular, scapulothoracic and UE control with self care, carrying, lifting, driving, computer work.   [] (04552) Gait Re-education- Provided training and instruction to the patient for proper LE, core and proximal hip recruitment and positioning and eccentric body weight control with ambulation re-education including up and down stairs     Home Management Training / Self Care:  [] (66225) Provided self-care/home management training related to activities of daily living and compensatory training, and/or use of adaptive equipment for improvement with: ADLs and compensatory training, meal preparation, safety procedures and instruction in use of adaptive equipment, including bathing, grooming, dressing, personal hygiene, basic household cleaning and chores.      Home Exercise Program:    [] (61176) Reviewed/Progressed HEP activities related to strengthening, flexibility, endurance, ROM of   [] LE / Lumbar: core, proximal hip and LE for functional self-care, mobility, lifting and ambulation/stair navigation   [] UE / Cervical: cervical, postural, scapular, scapulothoracic and UE control with self care, reaching, carrying, lifting, house/yardwork, driving, computer work  [] (63421)Reviewed/Progressed HEP activities related to improving balance, coordination, kinesthetic sense, posture, motor skill, proprioception of   [] LE: core, proximal hip and LE for self care, mobility, lifting, and ambulation/stair navigation    [] UE / Cervical: cervical, postural,  scapular, scapulothoracic and UE control with self care, reaching, carrying, lifting, house/yardwork, driving, computer work    Manual Treatments:  PROM / STM / Oscillations-Mobs:  G-I, II, III, IV (PA's, Inf., Post.)  [] ([address]) Provided manual therapy to mobilize LE, proximal hip and/or LS spine soft tissue/joints for the purpose of modulating pain, promoting relaxation,  increasing ROM, reducing/eliminating soft tissue swelling/inflammation/restriction, improving soft tissue extensibility and allowing for proper ROM for normal function with   [] LE / lumbar: self care, mobility, lifting and ambulation. [] UE / Cervical: self care, reaching, carrying, lifting, house/yardwork, driving, computer work. Modalities:  [] (12979) Vasopneumatic compression: Utilized vasopneumatic compression to decrease edema / swelling for the purpose of improving mobility and quad tone / recruitment which will allow for increased overall function including but not limited to self-care, transfers, ambulation, and ascending / descending stairs.        Modalities:    9/3, 8/28, 9/1: MHP to cerv spine x 10 min in supine   9/23: MHP to B shoulders and cervical spine in supine x 15 min (between PT and OT sessions)    Charges:  Timed Code Treatment Minutes: 40   Total Treatment Minutes: 40     [] EVAL - LOW (02143) x   [] EVAL - MOD (78107)  [] EVAL - HIGH (28815)  [] RE-EVAL (83043)  [x] SX(62039) x 2       [] Ionto  [x] NMR (37487) x 1      [] Vaso  [] Manual (02895) x        [] Ultrasound  [] TA x        [] Mech Traction (38344)  [] Aquatic Therapy x     [] ES (un) (18914):   [] Home Management Training x  [] ES(attended) (97121)   [] Limitations/Impairments:  []Sleeping []Sitting               [x]Standing []Transfers        [x]Walking [x]Kneeling               [x]Stairs [x]Squatting / bending   [x]ADLs [x]Reaching  [x]Lifting  [x]Housework  [x]Driving [x]Job related tasks  []Sports/Recreation []Other:        ASSESSMENT: Able to preform all therex in [facility] without pain, but was limited with resistance due to remainig weakness. Pt requires cues for form to avoid scap elevation and to engage correct shoulder/back mm. Cervicothoracic mm tight but do respond to stretching. Advised pt to purchase theracane to help reduce trigger points and adhesions at home. Treatment/Activity Tolerance:  [] Patient able to complete tx  [x] Patient limited by fatigue  [] Patient limited by pain  [x] Patient limited by other medical complications  [] Other:     Prognosis: [] Good [x] Fair  [] Poor    Patient Requires Follow-up: [x] Yes  [] No    Plan for next treatment session:  Overall strength - UEs, LEs, abdominals as able, endurance training, posture training     PLAN: See eval. PT 2x / week for 6 weeks. [x] Continue per plan of care [] Alter current plan (see comments)  [] Plan of care initiated [] Hold pending MD visit [] Discharge    Electronically signed by: Ashley Ramírez PT, DPT     Note: If patient does not return for scheduled/ recommended follow up visits, this note will serve as a discharge from care along with most recent update on progress.

## 2020-11-11 ENCOUNTER — HOSPITAL ENCOUNTER (OUTPATIENT)
Dept: OCCUPATIONAL THERAPY | Age: 35
Setting detail: THERAPIES SERIES
Discharge: HOME | End: 2020-11-11
Payer: COMMERCIAL

## 2020-11-11 ENCOUNTER — HOSPITAL ENCOUNTER (OUTPATIENT)
Dept: PHYSICAL THERAPY | Age: 35
Setting detail: THERAPIES SERIES
Discharge: HOME | End: 2020-11-11
Payer: COMMERCIAL

## 2020-11-11 PROCEDURE — 97530 THERAPEUTIC ACTIVITIES: CPT

## 2020-11-11 PROCEDURE — 97110 THERAPEUTIC EXERCISES: CPT

## 2020-11-11 PROCEDURE — 97140 MANUAL THERAPY 1/> REGIONS: CPT

## 2020-11-11 NOTE — FLOWSHEET NOTE
[facility] - Outpatient Occupational Therapy      Occupational Therapy Daily Treatment Note  Date:  2020    Patient: Levar Painter   : 1985   MRN: [id number]  Referring Physician:  Dr. Jaxon Lowe Diagnosis Information:  Dx- physical deconditioning   Due to [facility]                                      Insurance information:  [facility]; ($15 copay, 20 v per year, no estim)  Date of Injury:  Date of Surgery:       Visit # Insurance Allowable Date Range    +  to        Date of Patient follow up with Physician:     Progress Note: []  Yes  [x]  No  Next due by: Visit #20  Or 20    Latex Allergy:  [x]No      []Yes    Pacemaker:  [x] No       [] Yes     Preferred Language for Healthcare:   [x]English       []other:    Pain level: chronic abdominal discomfort    SUBJECTIVE:   Pt reports her return to work 2 days/wk has gone well, but she had to leave early a couple days. Pt also reporting increased fatigue from sitting in desk chair. Functional Disability Index: Quick DASH: raw score = ; dysfunction = 43%    OBJECTIVE:      Hand Assessment Left  Right  Left 10/22  right    Strength (lbs) 21  : 29 26  : 31 45  10/28: 32 45  10/28: 34   Lateral Pinch Strength (lbs) 6  : 11 8  : 11     3 Jaw Nicholas Pinch Strength (lbs) 6  : 8 9  : 11 9 9   Tip Pinch Strength (lbs) 5  : 6.5 7  : 9     Opposition (Y/N) Y Y         RESTRICTIONS/PRECAUTIONS: lifting restriction of 8#, frail, J peg tubing      Exercises/Interventions: Session initiated with 4 minutes UBE for strengthening and cardiovascular benefits with improved rpm noted and no SOB following. Pt assumed supine position on mat to receive myofascial release and STM of mid and upper traps with decreased tightness following. Pt then completed interval of 1 minute on/30 seconds off x3 of thoracic and neck extension for strengthening and control.  Pt transferred to prone position to attempt neck and thoracic flexion for upper abdominals with difficulty engaging abdominals rather than relying on neck flexion to compensate. Position adjusted with pt assuming modified boat pose for 30 seconds x2 and 45 seconds x1 with 30-second rest break intervals with improved trunk engagement. Pt then sat edge of mat to complete half sit-ups x8 with therapist holding knees with good activation of quads and trunk. Next, pt extended BUEs in 90 degrees abduction to complete 1 minute each of forward/back pulses, up/down pulses, \"ballerina arms\" with horizontal ab/adduction followed by shoulder flexion/extension, and large arm circles for UE endurance, posture, and scapular stabilization with emphasis on preventing shoulder elevation and over-contraction of traps with verbal and physical cues required. Session concluded with discussion regarding lumbar and neck support for desk chair to promote improved posture due to weakness and pt's desk chair too large due to thin body habitus; pt found appropriate option on [employer] and will trial next week.      Therapeutic Exercises  Resistance / level Sets/sec Reps Notes                 Manual Intervention                                                                                        Patient education:  Eval, POC, HEP, Role of OT, Goals- pt verbalized understanding, pt would benefit from visual aid of HEP in future session      Home Exercise Program:  Pt educated to complete the following bodyweight exercises to increase strength and endurance:  1) shoulder flexion  2) shoulder extension  3) elbow flexion   4) elbow extension  5) shoulder abduction  6) shoulder adduction  8/26/20: scapular retraction/protraction x10, 3-5x/day to increase scapular stability and improve posture  8/28/20: theraputty TE; 10 minutes max 2-4x/day  9/1/20: cat/cow and alternating UE reach with LE lift in quadruped  9/29 sit to 1/2 stand, anterior/ posterior pelvic tilt and external shoulder rotation to be completed 3-10 reps pending fatigue. Therapeutic Exercise and NMR:  [x] (96379) Provided verbal/tactile cueing for activities related to strengthening, flexibility, endurance, ROM  for improvements in scapular, scapulothoracic and UE control with self care, reaching, carrying, lifting, house/yardwork, driving/computer work. [x] (35809) Provided verbal/tactile cueing for activities related to improving balance, coordination, kinesthetic sense, posture, motor skill, proprioception  to assist with  scapular, scapulothoracic and UE control with self care, reaching, carrying, lifting, house/yardwork, driving/computer work.   [] Comments:    Therapeutic Activities:    [x] (01417 or 63449) Provided verbal/tactile cueing for activities related to improving balance, coordination, kinesthetic sense, posture, motor skill, proprioception and motor activation to allow for proper function of scapular, scapulothoracic and UE control with self care, carrying, lifting, driving/computer work  [] Comments:    Home Exercise Program:    [] (31293) Reviewed/Progressed HEP activities related to strengthening, flexibility, endurance, ROM of scapular, scapulothoracic and UE control with self care, reaching, carrying, lifting, house/yardwork, driving/computer work  [] (57000) Reviewed/Progressed HEP activities related to improving balance, coordination, kinesthetic sense, posture, motor skill, proprioception of scapular, scapulothoracic and UE control with self care, reaching, carrying, lifting, house/yardwork, driving/computer work    [] Comments:    Manual Treatments:  PROM / STM / Oscillations-Mobs:  G-I, II, III, IV (PA's, Inf., Post.)  [x] (07222) Provided manual therapy to mobilize soft tissue/joints of cervical/CT, scapular GHJ and UE for the purpose of modulating pain, promoting relaxation,  increasing ROM, reducing/eliminating soft tissue swelling/inflammation/restriction, improving soft tissue extensibility and allowing for proper ROM for normal function with self care, reaching, carrying, lifting, house/yardwork, driving/computer work  [] Comments:    ADL Training:  [] (15770) Provided self-care/home management training related to activities of daily living and compensatory training, and/or use of adaptive equipment   [] Comments:     Splinting:  [] Fabrication of:   [] (47440) Orthotic/Prosthetic Management, subsequent encounter  [] (38568) Orthotic management and training (fitting and assessment)  [] Comments:    Modalities:     Charges:  Timed Code Treatment Minutes: 45   Total Treatment Minutes: 45     [] EVAL (LOW) 61577   [] OT Re-eval (23100)  [] EVAL (MOD) 08271   [] EVAL (HIGH) 16178       [x] Mariam (27560) x   1 [] GBPMK(08004)  [] NMR (17941) x     [] Estim (attended) (67069)   [x] Manual ([id number]) x  1   [] US (58518)  [x] TA (20026) x  1   [] Paraffin (55198)  [] ADL  ([id number]) x    [] Splint/L code:    [] Estim (unattended) ([id number])  [] Fluidotherapy (97306)  [] Other:     GOALS:  1) Patient stated goal: To gain strength and activity tolerance needed to return back home independently. []? Progressing: []? Met: []? Not Met: [x]? Adjusted   9/21/20: Pt has sold her home to live with her parents indefinitely due to continued medical issues and nutrition- goal ceased at this time. 2) Patient stated goal: To complete bathing in stance with no use of shower chair  []? Progressing: [x]? Met: []? Not Met: []? Adjusted   9/21/20: near complete; pt required shower chair for final few minutes of most recent shower     Therapist goals for Patient:   Short Term Goals: To be achieved in: 30 days  1. Pt will increase  strength in B hands by 10 lbs by 9/14 to increase functional capacity for ADL/IADL tasks  []? Progressing: [x]? Met: []? Not Met: []? Adjusted  2. Pt will demo increased activity tolerance and safety needed to complete cooking task at independent level by 9/14  []? Progressing: [x]?  Met: []? Not Met: []? Adjusted  3. Pt will demo increase shoulder strength as evidence by ability to complete cleaning task with use of broom or vacuum by 9/14  [x]? Progressing: []? Met: []? Not Met: []? Adjusted     Long Term Goals: To be achieved by discharge  1. Pt will report a QuickDASH Symptom Severity Scale score of 30% or less indicating increased safety and functional independence in desired occupational pursuits by discharge. [x]? Progressing: []? Met: []? Not Met: []? Adjusted    Progression Towards Functional goals:  [x] Patient is progressing as expected towards functional goals listed. [] Progression is slowed due to complexities listed. [] Progression has been slowed due to co-morbidities. [] Plan just implemented, too soon to assess goals progression  [] All goals are met  [] Other:     ASSESSMENT:  See eval    Treatment/Activity Tolerance:  [x] Patient tolerated treatment well [] Patient limited by fatigue  [] Patient limited by pain  [] Patient limited by other medical complications  [] Other:     Prognosis: [x] Good [] Fair  [] Poor    Patient Requires Follow-up: [x] Yes  [] No    PLAN: See eval  [x] Continue per plan of care [] Alter current plan (see comments)  [] Plan of care initiated [] Hold pending MD visit [] Discharge    Electronically signed by:      RACQUEL Dugan/L [id number]    Note: If patient does not return for scheduled/ recommended follow up visits, this note will serve as a discharge from care along with most recent update on progress.

## 2020-11-11 NOTE — FLOWSHEET NOTE
[address] [facility] Physical Therapy  Phone: [phone]   Fax: [phone]    Physical Therapy Daily Treatment Note  Date:  2020    Patient Name:  Wendy Martinez    :  1985  MRN: [id number]  Medical/Treatment Diagnosis Information:  · Diagnosis: physical deconditioning  Treatment Diagnosis: decreased B UE and LE stretch, poor endurance, impaired posture, increased muscle tension through R cervicothoracic region, spinal malalignment  Insurance/Certification information:  PT Insurance Information: [facility] ($15 copay, 20 v per year no estim)  Physician Information:  Referring Practitioner:  - Pt provided information for surgeon who is overseeing all care now:  Dr. Margaret Lewis  [address] [facility]  Phone: [phone]  Fax: [phone]  Plan of care signed (Y/N): []  Yes [x]  No     Date of Patient follow up with Physician: ?     Progress Report: []  Yes  [x]  No     Date Range for reporting period:  Beginnin2020  Endin/16    Progress report due (10 Rx/or 30 days whichever is less): visit #44      Recertification due (POC duration/ or 90 days whichever is less): 2020     Visit # Insurance Allowable Auth required? Date Range     +  20 []  Yes  [x]  No n/a     Latex Allergy:  [x]NO      []YES  Preferred Language for Healthcare:   [x]English       []other:    Functional Scale:        Date assessed:  30 sec STS: raw score 5 reps                                           2020    30 sec STS: raw score 9 reps                                           2020    Pain level:  4/10 abdominal and neck pain     SUBJECTIVE: Pt reports she is doing Monday and Thursday in the office at work and doing a [employer] on . Pt reports she has been pretty exhausted on the days between when she is working.       OBJECTIVE:   :  Strength (0-5) Left Right   Shoulder flexion 4 4   Shoulder abd 4- 4* Shoulder ER 4 4+   Shoulder IR 4 4        Seated hip flexion 4- 4-   Seated hip ER 4- 4-   Seated hip IR 4- 4-   Knee flexion 4 4   Knee ext 4 4   Ankle DF 4+ 4+            * = painful         Orthopaedic Special Tests  Positive  Negative  NT Comments    30 sec STS        9 reps   sahrmann level 0.5       Able to complete    sahrmann level 1a       Unable to keep back on table    6 MWT    1166 ft / 355 m       RESTRICTIONS/PRECAUTIONS: lifting restriction of 8#, frail, J peg tubing     Exercises/Interventions:    Therapeutic Exercises (72599) Resistance / level Sets/sec Reps Notes   Nustep  Step one   Bike    TM forward 1. 5mphX 5 min   IB  HR/TR    LAQ  Seated march    sarhamnn lower ab strength          deadbug position iso holds  SB lift with knees flexed    Prone press up (mini due to tube)  Leg ext in prone  Prone HS curls  Prone ext with knee bent       Mat therex:  · Supine SLR  · SAQ  · Hip Add squeezes  · Hooklying clamshells   · Supine shoulder ER stretch         Supine with 2 pillows          Done after CT   Seated therex:  · Rows with TB loop  · B ER with TB loop    Seated on green SB:  Mid rows  LPD  High rows  Ant/post tilts  Lateral tilts  cw and ccw circles   OH lifts  Trunk twists  Chops Red medi ball    Red med ball  1  1x10  x10 B  First set with [place] TB   Standing therex:  · Hip 3 way  · FWD step ups  · Lateral step ups  · Ham curl  · Squats  · High knee march  · Hip abd   · Lateral band steps   May need orange TB at home   No UE assist, done in // bars   No UE assist, done in // bars  B UE support  B UE support  B UE support   B UE support, small distance to stay close to bag   Gliders - retro     Gliders - lateral     Free weights:  Bicep curl  Hammer curl  Palm down curl   Lat raises   OH press   Tricep kick back 9/9:  Done Standing    SB rolls on wall   SB rainbows on wall  Small lean fwd for incr stretch   Cables:  Mid rows   LPD  High to low chop  High rows  Hip ext   Hip abd   Hip flexion  High to low chop   abdominal rot      2 pl  1 pl 2  2 x10 B  x10 B    Sidebending stretch with swiss ball  Discontinued - caused pressure in side of abdomen    Swiss ball extension stretch     TRX:  · Rows  · Squats  · Y's  · Lunges      [facility]  Chest press  LPD  Shoulder press  TRX rows  TRX Ys  Free weight bicep curl  Free weight tricep kick back  8# forward lunge walk   6# slam ball OH to floor   Lateral Band walk holding kettle bell      Leg press  Hip add  Hip abd   elliptical      10#  20#  10#            Blue band + KB    70#  40#  40#        1  1  1  1  1    2      1  1  1   x10  x10  x10  x10  x10      Laps on gray floor    x10  x10  x10       Squat to stand with OH press Red medi ball 1 x10 Bariatric chair          Therapeutic Activities (64341)       Posture with 1/2 foam roll  · CT with scap squeeze  · B ER     Head position                            Neuromuscular Re-ed (21044)       Chin tucks in supine     Weighted walkouts with waist strap Fwd, retro, R, L   Tiltboard taps - A/P, M/L    Tiltboard DLS - A/P, M/L    Bosu lunges on blue side Light fingertips for balance    Bosu - black side  · Mini squats  · Moguls    CC paloff press    Lat stretch at ballet barre   ER stretch on wall   UT/LS stretch on ballet barre   Thread the needle at ballet barre  Push ups at ballet barre      VCs to side bend and rot away from barre   Foam roll along scaps    Vertically and horizontal    theracane to R UT      lumb stretch into flexion on ballet barre     UT stretch  LS stretch                    Manual Intervention (10789)       cervical stretching into L SBing and L rotation, rotational mobs grade 1 to correct L upper t spine rotations, gentle cervical traction in neutral, DTM to L cervical paraspinals       Gentle cervical traction in neutral, STM to B UTs, cervical paraspinals and LS, cervical stretching into L SBing and L rotation, SOR,         Gentle cervical traction in neutral, STM to B UTs, manual stretching of UTs, LS, and full flexion with rotation, SOR       PROM into SBing and rot, grade 2 PA mobs through c spine, DTM to R UT and scalenes, SOR, manual traction in neutral gently, side glides B grade 3    8/31: Increased tightness when SBing and rot to R   DTM to B UT, SOR, STM to cervical paraspinals, manual traction, PROM S + rot, OA nods, grade 2 PA mobs through mid to low C-spine        PROM into SBing and rot in neutral and flexion,   manual traction in neutral gently, DTM to B UT and LS             Pt. Education:  11/5: issued HO of [facility] page for theracane     8/14: patient educated on diagnosis, prognosis and expectations for rehab, all patient questions were answered; stressed importance of taking breaks when needed and completing HEP each day in short bouts; encouraged pt to find head neutral every time she passes a mirror to avoid holding head towards the right     Home Exercise Program:  Access Code: [email or web address]   URL: [employer]/   Date: 09/23/2020   Prepared by: Gabe Ayala     Exercises   Supine 90/90 Alternating Toe Touch - 10 reps - 3 sets - 1x daily - 7x weekly   Modified Supine 90/90 Leg Extensions - 10 reps - 3 sets - 1x daily - 7x weekly     Access Code: [email or web address]   URL: [employer]/   Date: 09/14/2020   Prepared by: Gabe Ayala   + lime TB  Exercises   Standing Bicep Curls Supinated with Dumbbells - 10 reps - 2 sets - 1x daily - 7x weekly   Standing Bicep Curls Neutral with Dumbbells - 10 reps - 2 sets - 1x daily - 7x weekly   Standing Pronated Elbow Flexion with Dumbbell - 10 reps - 2 sets - 1x daily - 7x weekly   Shoulder Abduction with Dumbbells - Palms Down - 10 reps - 2 sets - 1x daily - 7x weekly   Shoulder Overhead Press in Abduction with Dumbbells - 10 reps - 2 sets - 1x daily - 7x weekly   Standing Bent Over Triceps Extension - 10 reps - 2 sets - 1x daily - 7x weekly     Access Code: [email or web address]   URL: [employer]/   Date: 08/26/2020   Prepared by: Blanchie Mcburney with Resistance - 10 reps - 1 sets - 2x daily - 7x weekly   Shoulder External Rotation with Resistance - 10 reps - 1 sets - 2x daily - 7x weekly   Standing Hip Flexion with Resistance Loop - 10 reps - 2 sets - 1x daily - 7x weekly   Hip Abduction with Resistance Loop - 10 reps - 2 sets - 1x daily - 7x weekly   Hip Extension with Resistance Loop - 10 reps - 2 sets - 1x daily - 7x weekly     Access Code: [email or web address]   URL: [employer]/   Date: 08/19/2020   Prepared by: Blanchie Mcburney with Resistance - 10 reps - 1 sets - 2x daily - 7x weekly   Shoulder External Rotation with Resistance - 10 reps - 1 sets - 2x daily - 7x weekly     Access Code: [email or web address]   URL: [employer]/   Date: 08/14/2020   Prepared by: Jaqueline Albrecht     Exercises   Seated Long Arc Quad - 10 reps - 3 sets - 1x daily - 7x weekly   Seated March - 10 reps - 3 sets - 1x daily - 7x weekly   Supine Active Straight Leg Raise - 10 reps - 3 sets - 1x daily - 7x weekly     Therapeutic Exercise and NMR EXR  [x] (69258) Provided verbal/tactile cueing for activities related to strengthening, flexibility, endurance, ROM for improvements in  [x] LE / Lumbar: LE, proximal hip, and core control with self care, mobility, lifting, ambulation. [x] UE / Cervical: cervical, postural, scapular, scapulothoracic and UE control with self care, reaching, carrying, lifting, house/yardwork, driving, computer work.  [] (92787) Provided verbal/tactile cueing for activities related to improving balance, coordination, kinesthetic sense, posture, motor skill, proprioception to assist with   [] LE / lumbar: LE, proximal hip, and core control in self care, mobility, lifting, ambulation and eccentric single leg control.    [] UE / cervical: cervical, scapular, scapulothoracic and UE control with self care, reaching, carrying, lifting, house/yardwork, driving, computer work.   [] (43881) Therapist is in constant attendance of 2 or more patients providing skilled therapy interventions, but not providing any significant amount of measurable one-on-one time to either patient, for improvements in  [] LE / lumbar: LE, proximal hip, and core control in self care, mobility, lifting, ambulation and eccentric single leg control. [] UE / cervical: cervical, scapular, scapulothoracic and UE control with self care, reaching, carrying, lifting, house/yardwork, driving, computer work. NMR and Therapeutic Activities:    [x] (17482 or 42696) Provided verbal/tactile cueing for activities related to improving balance, coordination, kinesthetic sense, posture, motor skill, proprioception and motor activation to allow for proper function of   [x] LE: / Lumbar core, proximal hip and LE with self care and ADLs  [] UE / Cervical: cervical, postural, scapular, scapulothoracic and UE control with self care, carrying, lifting, driving, computer work.   [] (93177) Gait Re-education- Provided training and instruction to the patient for proper LE, core and proximal hip recruitment and positioning and eccentric body weight control with ambulation re-education including up and down stairs     Home Management Training / Self Care:  [] (78971) Provided self-care/home management training related to activities of daily living and compensatory training, and/or use of adaptive equipment for improvement with: ADLs and compensatory training, meal preparation, safety procedures and instruction in use of adaptive equipment, including bathing, grooming, dressing, personal hygiene, basic household cleaning and chores.      Home Exercise Program:    [] (73751) Reviewed/Progressed HEP activities related to strengthening, flexibility, endurance, ROM of   [] LE / Lumbar: core, proximal hip and LE for functional self-care, mobility, lifting and ambulation/stair navigation   [] UE / Cervical: cervical, postural, scapular, scapulothoracic and UE control with self care, reaching, carrying, lifting, house/yardwork, driving, computer work  [] (68336)Reviewed/Progressed HEP activities related to improving balance, coordination, kinesthetic sense, posture, motor skill, proprioception of   [] LE: core, proximal hip and LE for self care, mobility, lifting, and ambulation/stair navigation    [] UE / Cervical: cervical, postural,  scapular, scapulothoracic and UE control with self care, reaching, carrying, lifting, house/yardwork, driving, computer work    Manual Treatments:  PROM / STM / Oscillations-Mobs:  G-I, II, III, IV (PA's, Inf., Post.)  [] (84081) Provided manual therapy to mobilize LE, proximal hip and/or LS spine soft tissue/joints for the purpose of modulating pain, promoting relaxation,  increasing ROM, reducing/eliminating soft tissue swelling/inflammation/restriction, improving soft tissue extensibility and allowing for proper ROM for normal function with   [] LE / lumbar: self care, mobility, lifting and ambulation. [] UE / Cervical: self care, reaching, carrying, lifting, house/yardwork, driving, computer work. Modalities:  [] (17590) Vasopneumatic compression: Utilized vasopneumatic compression to decrease edema / swelling for the purpose of improving mobility and quad tone / recruitment which will allow for increased overall function including but not limited to self-care, transfers, ambulation, and ascending / descending stairs.        Modalities:    9/3, 8/28, 9/1: MHP to cerv spine x 10 min in supine   9/23: MHP to B shoulders and cervical spine in supine x 15 min (between PT and OT sessions)    Charges:  Timed Code Treatment Minutes: 42   Total Treatment Minutes: 42     [] EVAL - LOW (89621) x   [] EVAL - MOD (49603)  [] EVAL - HIGH (03200)  [] RE-EVAL (72363)  [x] XE(98712) x 3       [] Ionto  [] NMR (36684) x       [] Vaso  [] Manual (18188) x        [] Ultrasound  [] TA x [] [facility] Traction (24286)  [] Aquatic Therapy x     [] ES (un) (21344):   [] Home Management Training x  [] ES(attended) (63915)   [] Dry Needling 1-2 muscles (74688):  [] Dry Needling 3+ muscles (451944)  [] Group:      [] Other:     GOALS:  Patient stated goal: gain strength and endurance     Therapist goals for Patient:   Short Term Goals: To be achieved in: 2 weeks  1. Independent in HEP and progression per patient tolerance, in order to prevent re-injury. 2. Patient will have a decrease in pain to facilitate improvement in movement, function, and ADLs as indicated by Functional Deficits. Long Term Goals: To be achieved in: 6 weeks  1. Pt will increase 30 sec STS reps to at least 8 to demo improvement in endurance. GOAL MET 9/16  2. Patient will increase strength in B UEs to at least 4+/5 and B LEs to at least 4/5 so patient can stand, walk, and complete ADLs independently. Progressing   3. Patient will demonstrate an increase in postural awareness and control to allow for proper functional mobility as indicated by patients Functional Deficits. Progressing   4. Patient will be able to amb for 300 feet or greater without sitting rest break to progress towards PLOF. GOAL MET 9/16  5. Patient will return to functional activities including cooking without increased symptoms or restriction. Not Assessed - Has not tried cooking  6. Patient will improve 6MWT distance to at least 430 m (1400 ft) to demo improved endurance for community ambulation. NEW GOAL Added 9/16     Overall Progression Towards Functional goals/ Treatment Progress Update:  [x] Patient is progressing as expected towards functional goals listed. [] Progression is slowed due to complexities/Impairments listed. [] Progression has been slowed due to co-morbidities.   [] Plan just implemented, too soon to assess goals progression <30days   [] Goals require adjustment due to lack of progress  [] Patient is not progressing as expected and requires additional follow up with physician  [] Other    Persisting Functional Limitations/Impairments:  []Sleeping []Sitting               [x]Standing []Transfers        [x]Walking [x]Kneeling               [x]Stairs [x]Squatting / bending   [x]ADLs [x]Reaching  [x]Lifting  [x]Housework  [x]Driving [x]Job related tasks  []Sports/Recreation []Other:        ASSESSMENT: Session focused on whole body strengthening/endurance this session, as well as included new machines if pt is interested in using HP upon DC. Pt continues to demonstrate weakness in UEs, amador with OH machines. Will use remaining sessions to build gym routine/HEP for pt to use if she feels comfortable going to the gym upon DC. Treatment/Activity Tolerance:  [] Patient able to complete tx  [x] Patient limited by fatigue  [] Patient limited by pain  [x] Patient limited by other medical complications  [] Other:     Prognosis: [] Good [x] Fair  [] Poor    Patient Requires Follow-up: [x] Yes  [] No    Plan for next treatment session:  Overall strength - UEs, LEs, abdominals as able, endurance training, posture training     PLAN: See eval. PT 2x / week for 6 weeks. [x] Continue per plan of care [] Alter current plan (see comments)  [] Plan of care initiated [] Hold pending MD visit [] Discharge    Electronically signed by: Thony Goldstein PT, DPT     Note: If patient does not return for scheduled/ recommended follow up visits, this note will serve as a discharge from care along with most recent update on progress.

## 2020-11-18 ENCOUNTER — HOSPITAL ENCOUNTER (OUTPATIENT)
Dept: OCCUPATIONAL THERAPY | Age: 35
Setting detail: THERAPIES SERIES
Discharge: HOME | End: 2020-11-18
Payer: COMMERCIAL

## 2020-11-18 ENCOUNTER — HOSPITAL ENCOUNTER (OUTPATIENT)
Dept: PHYSICAL THERAPY | Age: 35
Setting detail: THERAPIES SERIES
Discharge: HOME | End: 2020-11-18
Payer: COMMERCIAL

## 2020-11-18 PROCEDURE — 97110 THERAPEUTIC EXERCISES: CPT

## 2020-11-18 PROCEDURE — 97530 THERAPEUTIC ACTIVITIES: CPT

## 2020-11-18 PROCEDURE — 97112 NEUROMUSCULAR REEDUCATION: CPT

## 2020-11-18 NOTE — FLOWSHEET NOTE
[address] [facility] Physical Therapy  Phone: [phone]   Fax: [phone]    Physical Therapy Daily Treatment Note  Date:  2020    Patient Name:  Yasmine Gibbons    :  1985  MRN: [id number]  Medical/Treatment Diagnosis Information:  · Diagnosis: physical deconditioning  Treatment Diagnosis: decreased B UE and LE stretch, poor endurance, impaired posture, increased muscle tension through R cervicothoracic region, spinal malalignment  Insurance/Certification information:  PT Insurance Information: [facility] ($15 copay, 20 v per year no estim)  Physician Information:  Referring Practitioner:  - Pt provided information for surgeon who is overseeing all care now:  Dr. Samantha Flores  [address] [facility]  Phone: [phone]  Fax: [phone]  Plan of care signed (Y/N): []  Yes [x]  No     Date of Patient follow up with Physician: ?     Progress Report: []  Yes  [x]  No     Date Range for reporting period:  Beginnin2020  Endin/16    Progress report due (10 Rx/or 30 days whichever is less): visit #51      Recertification due (POC duration/ or 90 days whichever is less): 2020     Visit # Insurance Allowable Auth required? Date Range     +  20 []  Yes  [x]  No n/a     Latex Allergy:  [x]NO      []YES  Preferred Language for Healthcare:   [x]English       []other:    Functional Scale:        Date assessed:  30 sec STS: raw score 5 reps                                           2020    30 sec STS: raw score 9 reps                                           2020    Pain level:  3-4/10 abdominal and neck pain     SUBJECTIVE: Pt reports that she did buy lumbar support pillows for work. Pt states that when she gets home from working she is absolutely exhausted.       OBJECTIVE:   :  Strength (0-5) Left Right   Shoulder flexion 4 4   Shoulder abd 4- 4*   Shoulder ER 4 4+   Shoulder IR 4 4        Seated hip flexion 4- 4-   Seated hip ER 4- 4-   Seated hip IR 4- 4-   Knee flexion 4 4   Knee ext 4 4   Ankle DF 4+ 4+            * = painful         Orthopaedic Special Tests  Positive  Negative  NT Comments    30 sec STS        9 reps   sahrmann level 0.5       Able to complete    sahrmann level 1a       Unable to keep back on table    6 MWT    1166 ft / 36 m       RESTRICTIONS/PRECAUTIONS: lifting restriction of 8#, frail, J peg tubing     Exercises/Interventions:    Therapeutic Exercises (26429) Resistance / level Sets/sec Reps Notes   Nustep  Step one   Bike    TM forward 2mphX 6 min   IB  HR/TR    LAQ  Seated march    [facility] lower ab strength          deadbug position iso holds  SB lift with knees flexed    Prone press up (mini due to tube)  Leg ext in prone  Prone HS curls  Prone ext with knee bent       Mat therex:  · Supine SLR  · SAQ  · Hip Add squeezes  · Hooklying clamshells   · Supine shoulder ER stretch         Supine with 2 pillows          Done after CT   Seated therex:  · Rows with TB loop  · B ER with TB loop    Seated on green SB:  Mid rows  LPD  High rows  Ant/post tilts  Lateral tilts  cw and ccw circles   OH lifts  Trunk twists  Chops      marching Red medi ball  \"  Red med ball  11  1    [id number]  x10 B    20 B  First set with Bosque TB   Standing therex:  · Hip 3 way  · FWD step ups  · Lateral step ups  · Ham curl  · Squats  · High knee march  · Hip abd   · Lateral band steps   May need orange TB at home   No UE assist, done in // bars   No UE assist, done in // bars  B UE support  B UE support  B UE support   B UE support, small distance to stay close to bag   Gliders - retro     Gliders - lateral     Free weights:  Bicep curl  Hammer curl  Palm down curl   Lat raises   OH press   Tricep kick back 9/9:  Done Standing    SB rolls on wall   SB rainbows on wall  Small lean fwd for incr stretch   Cables:  Mid rows   LPD  High to low chop  High rows  Hip ext   Hip abd   Hip flexion  High to low chop abdominal rot         2  2  2  2  2  2  2  2  2   x10  x10  x10 B  X 10 B   x10 B  x10 B  x10 B  x10 B  x10 B     Sidebending stretch with swiss ball  Discontinued - caused pressure in side of abdomen    Swiss ball extension stretch     TRX:  · Rows  · Squats  · Y's  · Lunges      [facility]  Chest press  LPD  Shoulder press  TRX rows  TRX Ys  Free weight bicep curl  Free weight tricep kick back  8# forward lunge walk   6# slam ball OH to floor   Lateral Band walk holding kettle bell      Leg press  Hip add  Hip abd   elliptical     Squats to plyo box with ball     10#  20#  10#            Blue band + KB    70#  40#  40#       8#     1  1  1      2      1  1  1      1   x10  x10  x10        Laps on gray floor    x10  x10  x10       10    Squat to stand with [place] press Bariatric chair          Therapeutic Activities (34708)       Posture with 1/2 foam roll  · CT with scap squeeze  · B ER     Head position                            Neuromuscular Re-ed (55483)       Chin tucks in supine     Weighted walkouts with waist strap Fwd, retro, R, L   Tiltboard taps - A/P, M/L    Tiltboard DLS - A/P, M/L    Bosu lunges on blue side Light fingertips for balance    Bosu - black side  · Mini squats  · Moguls    CC paloff press    Lat stretch at ballet barre   ER stretch on wall   UT/LS stretch on ballet barre   Thread the needle at ballet barre  Push ups at ballet barre      VCs to side bend and rot away from barre   Foam roll along scaps    Vertically and horizontal    theracane to R UT      lumb stretch into flexion on ballet barre     UT stretch  LS stretch      Swiss ball core/hip strength on mat:  · Bridge  · Heel dig with HS curl  · Cook bridge    2  2  1   10  10 B  10 B           Manual Intervention (66080)       cervical stretching into L SBing and L rotation, rotational mobs grade 1 to correct L upper t spine rotations, gentle cervical traction in neutral, DTM to L cervical paraspinals       Gentle cervical traction in neutral, STM to B UTs, cervical paraspinals and LS, cervical stretching into L SBing and L rotation, SOR,         Gentle cervical traction in neutral, STM to B UTs, manual stretching of UTs, LS, and full flexion with rotation, SOR       PROM into SBing and rot, grade 2 PA mobs through c spine, DTM to R UT and scalenes, SOR, manual traction in neutral gently, side glides B grade 3    8/31: Increased tightness when SBing and rot to R   DTM to B UT, SOR, STM to cervical paraspinals, manual traction, PROM S + rot, OA nods, grade 2 PA mobs through mid to low C-spine        PROM into SBing and rot in neutral and flexion,   manual traction in neutral gently, DTM to B UT and LS             Pt. Education:  11/5: issued HO of [facility] page for theracane     8/14: patient educated on diagnosis, prognosis and expectations for rehab, all patient questions were answered; stressed importance of taking breaks when needed and completing HEP each day in short bouts; encouraged pt to find head neutral every time she passes a mirror to avoid holding head towards the right     Home Exercise Program:  Access Code: [email or web address]   URL: [email or web address]   Date: 09/23/2020   Prepared by: David Fernándezwall     Exercises   Supine 90/90 Alternating Toe Touch - 10 reps - 3 sets - 1x daily - 7x weekly   Modified Supine 90/90 Leg Extensions - 10 reps - 3 sets - 1x daily - 7x weekly     Access Code: [email or web address]   URL: [employer]/   Date: 09/14/2020   Prepared by: David Fernándezwall   + lime TB  Exercises   Standing Bicep Curls Supinated with Dumbbells - 10 reps - 2 sets - 1x daily - 7x weekly   Standing Bicep Curls Neutral with Dumbbells - 10 reps - 2 sets - 1x daily - 7x weekly   Standing Pronated Elbow Flexion with Dumbbell - 10 reps - 2 sets - 1x daily - 7x weekly   Shoulder Abduction with Dumbbells - Palms Down - 10 reps - 2 sets - 1x daily - 7x weekly   Shoulder Overhead Press in Abduction with Dumbbells - 10 reps - 2 sets - 1x daily - 7x weekly   Standing Bent Over Triceps Extension - 10 reps - 2 sets - 1x daily - 7x weekly     Access Code: [email or web address]   URL: [email or web address]/   Date: 08/26/2020   Prepared by: Emily  with Resistance - 10 reps - 1 sets - 2x daily - 7x weekly   Shoulder External Rotation with Resistance - 10 reps - 1 sets - 2x daily - 7x weekly   Standing Hip Flexion with Resistance Loop - 10 reps - 2 sets - 1x daily - 7x weekly   Hip Abduction with Resistance Loop - 10 reps - 2 sets - 1x daily - 7x weekly   Hip Extension with Resistance Loop - 10 reps - 2 sets - 1x daily - 7x weekly     Access Code: [email or web address]   URL: [employer]/   Date: 08/19/2020   Prepared by: Emily  with Resistance - 10 reps - 1 sets - 2x daily - 7x weekly   Shoulder External Rotation with Resistance - 10 reps - 1 sets - 2x daily - 7x weekly     Access Code: [email or web address]   URL: [employer]/   Date: 08/14/2020   Prepared by: Raman Pond     Exercises   Seated Long Arc Quad - 10 reps - 3 sets - 1x daily - 7x weekly   Seated March - 10 reps - 3 sets - 1x daily - 7x weekly   Supine Active Straight Leg Raise - 10 reps - 3 sets - 1x daily - 7x weekly     Therapeutic Exercise and NMR EXR  [x] (98531) Provided verbal/tactile cueing for activities related to strengthening, flexibility, endurance, ROM for improvements in  [x] LE / Lumbar: LE, proximal hip, and core control with self care, mobility, lifting, ambulation.   [x] UE / Cervical: cervical, postural, scapular, scapulothoracic and UE control with self care, reaching, carrying, lifting, house/yardwork, driving, computer work.  [] (66173) Provided verbal/tactile cueing for activities related to improving balance, coordination, kinesthetic sense, posture, motor skill, proprioception to assist with   [] LE / lumbar: LE, proximal hip, and core control in self care, mobility, lifting, ambulation and eccentric single leg control. [] UE / cervical: cervical, scapular, scapulothoracic and UE control with self care, reaching, carrying, lifting, house/yardwork, driving, computer work.   [] (54134) Therapist is in constant attendance of 2 or more patients providing skilled therapy interventions, but not providing any significant amount of measurable one-on-one time to either patient, for improvements in  [] LE / lumbar: LE, proximal hip, and core control in self care, mobility, lifting, ambulation and eccentric single leg control. [] UE / cervical: cervical, scapular, scapulothoracic and UE control with self care, reaching, carrying, lifting, house/yardwork, driving, computer work. NMR and Therapeutic Activities:    [x] (92155 or 49574) Provided verbal/tactile cueing for activities related to improving balance, coordination, kinesthetic sense, posture, motor skill, proprioception and motor activation to allow for proper function of   [x] LE: / Lumbar core, proximal hip and LE with self care and ADLs  [] UE / Cervical: cervical, postural, scapular, scapulothoracic and UE control with self care, carrying, lifting, driving, computer work.   [] (52731) Gait Re-education- Provided training and instruction to the patient for proper LE, core and proximal hip recruitment and positioning and eccentric body weight control with ambulation re-education including up and down stairs     Home Management Training / Self Care:  [] (93627) Provided self-care/home management training related to activities of daily living and compensatory training, and/or use of adaptive equipment for improvement with: ADLs and compensatory training, meal preparation, safety procedures and instruction in use of adaptive equipment, including bathing, grooming, dressing, personal hygiene, basic household cleaning and chores.      Home Exercise Program:    [] (38504) Reviewed/Progressed HEP activities related to strengthening, flexibility, endurance, ROM of   [] LE / Lumbar: core, proximal hip and LE for functional self-care, mobility, lifting and ambulation/stair navigation   [] UE / Cervical: cervical, postural, scapular, scapulothoracic and UE control with self care, reaching, carrying, lifting, house/yardwork, driving, computer work  [] (30361)Reviewed/Progressed HEP activities related to improving balance, coordination, kinesthetic sense, posture, motor skill, proprioception of   [] LE: core, proximal hip and LE for self care, mobility, lifting, and ambulation/stair navigation    [] UE / Cervical: cervical, postural,  scapular, scapulothoracic and UE control with self care, reaching, carrying, lifting, house/yardwork, driving, computer work    Manual Treatments:  PROM / STM / Oscillations-Mobs:  G-I, II, III, IV (PA's, Inf., Post.)  [] (65154) Provided manual therapy to mobilize LE, proximal hip and/or LS spine soft tissue/joints for the purpose of modulating pain, promoting relaxation,  increasing ROM, reducing/eliminating soft tissue swelling/inflammation/restriction, improving soft tissue extensibility and allowing for proper ROM for normal function with   [] LE / lumbar: self care, mobility, lifting and ambulation. [] UE / Cervical: self care, reaching, carrying, lifting, house/yardwork, driving, computer work. Modalities:  [] (85296) Vasopneumatic compression: Utilized vasopneumatic compression to decrease edema / swelling for the purpose of improving mobility and quad tone / recruitment which will allow for increased overall function including but not limited to self-care, transfers, ambulation, and ascending / descending stairs.        Modalities:    9/3, 8/28, 9/1: MHP to cerv spine x 10 min in supine   9/23: MHP to B shoulders and cervical spine in supine x 15 min (between PT and OT sessions)    Charges:  Timed Code Treatment Minutes: 50   Total Treatment Minutes: 50     [] EVAL - LOW (09167) x   [] EVAL - MOD Plan just implemented, too soon to assess goals progression <30days   [] Goals require adjustment due to lack of progress  [] Patient is not progressing as expected and requires additional follow up with physician  [] Other    Persisting Functional Limitations/Impairments:  []Sleeping []Sitting               [x]Standing []Transfers        [x]Walking [x]Kneeling               [x]Stairs [x]Squatting / bending   [x]ADLs [x]Reaching  [x]Lifting  [x]Housework  [x]Driving [x]Job related tasks  []Sports/Recreation []Other:        ASSESSMENT: Session focused on whole body strengthening/endurance this session as pt continues to get very fatigued at work. Will likely be discharging next session as she has used all insurance visits for the year. Will need to update HEP and issue HP pass if pt is interested. Treatment/Activity Tolerance:  [] Patient able to complete tx  [x] Patient limited by fatigue  [] Patient limited by pain  [x] Patient limited by other medical complications  [] Other:     Prognosis: [] Good [x] Fair  [] Poor    Patient Requires Follow-up: [x] Yes  [] No    Plan for next treatment session:  Overall strength - UEs, LEs, abdominals as able, endurance training, posture training     PLAN: See eval. PT 2x / week for 6 weeks. [x] Continue per plan of care [] Alter current plan (see comments)  [] Plan of care initiated [] Hold pending MD visit [] Discharge    Electronically signed by: Emili Lantigua PT, DPT     Note: If patient does not return for scheduled/ recommended follow up visits, this note will serve as a discharge from care along with most recent update on progress.

## 2020-11-18 NOTE — FLOWSHEET NOTE
[facility] - Outpatient Occupational Therapy      Occupational Therapy Daily Treatment Note  Date:  2020    Patient: Solis Carter   : 1985   MRN: [id number]  Referring Physician:  Dr. Abida Rosenthal Diagnosis Information:  Dx- physical deconditioning   Due to [facility]                                      Insurance information:  [facility]; ($15 copay, 20 v per year, no estim)  Date of Injury:  Date of Surgery:       Visit # Insurance Allowable Date Range    +  to        Date of Patient follow up with Physician:     Progress Note: []  Yes  [x]  No  Next due by: Visit #20  Or 20    Latex Allergy:  [x]No      []Yes    Pacemaker:  [x] No       [] Yes     Preferred Language for Healthcare:   [x]English       []other:    Pain level: chronic abdominal discomfort    SUBJECTIVE:   Pt reports the lumbar support for her desk chair has helped her feel more comfortable and to sit up straight more, but that she continues to have tightness in upper trunk and neck, as well as fatigue following sitting for long periods of time. Pt reports she has been completing trunk strengthening exercises at home with success. Functional Disability Index: Quick DASH: raw score = ; dysfunction = 43%    OBJECTIVE:      Hand Assessment Left  Right  Left 10/22  right    Strength (lbs) 21  : 29 26  : 31 45  10/28: 32 45  10/28: 34   Lateral Pinch Strength (lbs) 6  : 11 8  : 11     3 Jaw Nicholas Pinch Strength (lbs) 6  : 8 9  : 11 9 9   Tip Pinch Strength (lbs) 5  : 6.5 7  : 9     Opposition (Y/N) Y Y         RESTRICTIONS/PRECAUTIONS: lifting restriction of 8#, frail, J peg tubing      Exercises/Interventions: Session initiated with pt education on continued use of lumbar support when sitting in desk chair as long-term benefits may address continued concerns with tightness and weakness with pt verbalizing understanding.  Pt completed 4 minutes UBE for strengthening and cardiovascular benefits with improved RPM noted and no SOB following. Pt ascended 12 steps x2 after ambulation to [facility] to complete trunk extension TE on machine with 50# x8 with verbal cues for eccentric control during hip flexion. At TRX bands, pt completed mid row facing towards bands and facing away x8 each with focus on trunk alignment, followed by squats with shoulder flexion x8 with pt reporting stretch through back and shoulders. Pt then stood on balance board to complete dynamic balance tasks with SBA for trunk engagement and LE stability with pt reporting improved balance. Session concluded with pt descending 12 steps x2 [facility]. Plan to review HEP at next session to continue and maintain progress after POC ends. Therapeutic Exercises  Resistance / level Sets/sec Reps Notes                 Manual Intervention                                                                                        Patient education:  Eval, POC, HEP, Role of OT, Goals- pt verbalized understanding, pt would benefit from visual aid of HEP in future session      Home Exercise Program:  Pt educated to complete the following bodyweight exercises to increase strength and endurance:  1) shoulder flexion  2) shoulder extension  3) elbow flexion   4) elbow extension  5) shoulder abduction  6) shoulder adduction  8/26/20: scapular retraction/protraction x10, 3-5x/day to increase scapular stability and improve posture  8/28/20: theraputty TE; 10 minutes max 2-4x/day  9/1/20: cat/cow and alternating UE reach with LE lift in quadruped  9/29 sit to 1/2 stand, anterior/ posterior pelvic tilt and external shoulder rotation to be completed 3-10 reps pending fatigue.        Therapeutic Exercise and NMR:  [x] (89421) Provided verbal/tactile cueing for activities related to strengthening, flexibility, endurance, ROM  for improvements in scapular, scapulothoracic and UE control with self care, reaching, carrying, lifting, house/yardwork, driving/computer work. [x] (56764) Provided verbal/tactile cueing for activities related to improving balance, coordination, kinesthetic sense, posture, motor skill, proprioception  to assist with  scapular, scapulothoracic and UE control with self care, reaching, carrying, lifting, house/yardwork, driving/computer work.   [] Comments:    Therapeutic Activities:    [x] (64975 or 67180) Provided verbal/tactile cueing for activities related to improving balance, coordination, kinesthetic sense, posture, motor skill, proprioception and motor activation to allow for proper function of scapular, scapulothoracic and UE control with self care, carrying, lifting, driving/computer work  [] Comments:    Home Exercise Program:    [] (61070) Reviewed/Progressed HEP activities related to strengthening, flexibility, endurance, ROM of scapular, scapulothoracic and UE control with self care, reaching, carrying, lifting, house/yardwork, driving/computer work  [] (58959) Reviewed/Progressed HEP activities related to improving balance, coordination, kinesthetic sense, posture, motor skill, proprioception of scapular, scapulothoracic and UE control with self care, reaching, carrying, lifting, house/yardwork, driving/computer work    [] Comments:    Manual Treatments:  PROM / STM / Oscillations-Mobs:  G-I, II, III, IV (PA's, Inf., Post.)  [] (88919) Provided manual therapy to mobilize soft tissue/joints of cervical/CT, scapular GHJ and UE for the purpose of modulating pain, promoting relaxation,  increasing ROM, reducing/eliminating soft tissue swelling/inflammation/restriction, improving soft tissue extensibility and allowing for proper ROM for normal function with self care, reaching, carrying, lifting, house/yardwork, driving/computer work  [] Comments:    ADL Training:  [] (60197) Provided self-care/home management training related to activities of daily living and compensatory training, and/or use of adaptive equipment   [] Comments:     Splinting:  [] Fabrication of:   [] (31011) Orthotic/Prosthetic Management, subsequent encounter  [] (99478) Orthotic management and training (fitting and assessment)  [] Comments:    Modalities:     Charges:  Timed Code Treatment Minutes: 45   Total Treatment Minutes: 45     [] EVAL (LOW) 69392   [] OT Re-eval (75162)  [] EVAL (MOD) 14246   [] EVAL (HIGH) 21896       [x] Mariam (43141) x   2 [] CQBMR(42053)  [] NMR (63728) x     [] Estim (attended) (44186)   [] Manual ([id number]) x     [] US (02322)  [x] TA (53419) x  1   [] Paraffin (46861)  [] ADL  (88 [id number] 24 60) x    [] Splint/L code:    [] Estim (unattended) (22 [id number])  [] Fluidotherapy (43782)  [] Other:     GOALS:  1) Patient stated goal: To gain strength and activity tolerance needed to return back home independently. []? Progressing: []? Met: []? Not Met: [x]? Adjusted   9/21/20: Pt has sold her home to live with her parents indefinitely due to continued medical issues and nutrition- goal ceased at this time. 2) Patient stated goal: To complete bathing in stance with no use of shower chair  []? Progressing: [x]? Met: []? Not Met: []? Adjusted   9/21/20: near complete; pt required shower chair for final few minutes of most recent shower     Therapist goals for Patient:   Short Term Goals: To be achieved in: 30 days  1. Pt will increase  strength in B hands by 10 lbs by 9/14 to increase functional capacity for ADL/IADL tasks  []? Progressing: [x]? Met: []? Not Met: []? Adjusted  2. Pt will demo increased activity tolerance and safety needed to complete cooking task at independent level by 9/14  []? Progressing: [x]? Met: []? Not Met: []? Adjusted  3. Pt will demo increase shoulder strength as evidence by ability to complete cleaning task with use of broom or vacuum by 9/14  [x]? Progressing: []? Met: []? Not Met: []? Adjusted     Long Term Goals: To be achieved by discharge  1.  Pt will report a QuickDASH Symptom Severity Scale score of 30% or less indicating increased safety and functional independence in desired occupational pursuits by discharge. [x]? Progressing: []? Met: []? Not Met: []? Adjusted    Progression Towards Functional goals:  [x] Patient is progressing as expected towards functional goals listed. [] Progression is slowed due to complexities listed. [] Progression has been slowed due to co-morbidities. [] Plan just implemented, too soon to assess goals progression  [] All goals are met  [] Other:     ASSESSMENT:  See eval    Treatment/Activity Tolerance:  [x] Patient tolerated treatment well [] Patient limited by fatigue  [] Patient limited by pain  [] Patient limited by other medical complications  [] Other:     Prognosis: [x] Good [] Fair  [] Poor    Patient Requires Follow-up: [x] Yes  [] No    PLAN: See eval  [x] Continue per plan of care [] Alter current plan (see comments)  [] Plan of care initiated [] Hold pending MD visit [] Discharge    Electronically signed by:      Kashmir Mills OTR/L [id number]    Note: If patient does not return for scheduled/ recommended follow up visits, this note will serve as a discharge from care along with most recent update on progress.

## 2020-11-25 ENCOUNTER — APPOINTMENT (OUTPATIENT)
Dept: PHYSICAL THERAPY | Age: 35
End: 2020-11-25
Payer: COMMERCIAL

## 2020-11-25 ENCOUNTER — APPOINTMENT (OUTPATIENT)
Dept: OCCUPATIONAL THERAPY | Age: 35
End: 2020-11-25
Payer: COMMERCIAL

## 2020-12-02 ENCOUNTER — HOSPITAL ENCOUNTER (OUTPATIENT)
Dept: PHYSICAL THERAPY | Age: 35
Setting detail: THERAPIES SERIES
Discharge: HOME | End: 2020-12-02
Payer: COMMERCIAL

## 2020-12-02 ENCOUNTER — HOSPITAL ENCOUNTER (OUTPATIENT)
Dept: OCCUPATIONAL THERAPY | Age: 35
Setting detail: THERAPIES SERIES
Discharge: HOME | End: 2020-12-02
Payer: COMMERCIAL

## 2020-12-02 PROCEDURE — 97110 THERAPEUTIC EXERCISES: CPT

## 2020-12-02 PROCEDURE — 97530 THERAPEUTIC ACTIVITIES: CPT

## 2020-12-02 NOTE — DISCHARGE SUMMARY
[facility] - Outpatient Occupational Therapy      Occupational Therapy Daily Treatment Note/Discharge Summary  Date:  2020    Patient: Pedro Sheikh   : 1985   MRN: [id number]  Referring Physician:  Dr. Abida Pinto Diagnosis Information:  Dx- physical deconditioning   Due to [facility]                                      Insurance information:  [facility]; ($15 copay, 20 v per year, no estim)  Date of Injury:  Date of Surgery:       Visit # Insurance Allowable Date Range    +  to        Date of Patient follow up with Physician:     Progress Note: [x]  Yes  []  No  Next due by: Visit #20      Latex Allergy:  [x]No      []Yes    Pacemaker:  [x] No       [] Yes     Preferred Language for Healthcare:   [x]English       []other:    Pain level: chronic abdominal discomfort    SUBJECTIVE:   Pt reports she has made a lot of improvements in activity tolerance during ADLs and IADLs. Pt reports she still has fatigue at the end of the day and would like to continue strengthening as she is not yet at [facility], but feels confident to continue progress with HEP. Functional Disability Index: Quick DASH: raw score = 30; dysfunction = 43%    20: Quick DASH: raw score = 23; dysfunction = 27.27%      OBJECTIVE:      Hand Assessment Left  Right  Left   right    Strength (lbs) 21  : 29 26  : 31 10: 32  12: 35 10: 34  12: 37   Lateral Pinch Strength (lbs) 6  : 11 8  : 11 12: 11.5 : 13.5   3 Jaw Nicholas Pinch Strength (lbs) 6  : 8 9  : 11 9  12: 9 9  12: 11   Tip Pinch Strength (lbs) 5  : 6.5 7  : 9     Opposition (Y/N) Y Y         RESTRICTIONS/PRECAUTIONS: lifting restriction of 8#, frail, J peg tubing      Exercises/Interventions: Session initiated with assessment of pt progress with subjective and objective measures noted above, and progress with goals noted below with all goals met at this time.  Reviewed pt's HEP Exercise and NMR:  [x] (83492) Provided verbal/tactile cueing for activities related to strengthening, flexibility, endurance, ROM  for improvements in scapular, scapulothoracic and UE control with self care, reaching, carrying, lifting, house/yardwork, driving/computer work. [x] (93817) Provided verbal/tactile cueing for activities related to improving balance, coordination, kinesthetic sense, posture, motor skill, proprioception  to assist with  scapular, scapulothoracic and UE control with self care, reaching, carrying, lifting, house/yardwork, driving/computer work.   [] Comments:    Therapeutic Activities:    [x] (86837 or 46256) Provided verbal/tactile cueing for activities related to improving balance, coordination, kinesthetic sense, posture, motor skill, proprioception and motor activation to allow for proper function of scapular, scapulothoracic and UE control with self care, carrying, lifting, driving/computer work  [] Comments:    Home Exercise Program:    [x] (80265) Reviewed/Progressed HEP activities related to strengthening, flexibility, endurance, ROM of scapular, scapulothoracic and UE control with self care, reaching, carrying, lifting, house/yardwork, driving/computer work  [] (26724) Reviewed/Progressed HEP activities related to improving balance, coordination, kinesthetic sense, posture, motor skill, proprioception of scapular, scapulothoracic and UE control with self care, reaching, carrying, lifting, house/yardwork, driving/computer work    [] Comments:    Manual Treatments:  PROM / STM / Oscillations-Mobs:  G-I, II, III, IV (PA's, Inf., Post.)  [] (70352) Provided manual therapy to mobilize soft tissue/joints of cervical/CT, scapular GHJ and UE for the purpose of modulating pain, promoting relaxation,  increasing ROM, reducing/eliminating soft tissue swelling/inflammation/restriction, improving soft tissue extensibility and allowing for proper ROM for normal function with self care, reaching, carrying, lifting, house/yardwork, driving/computer work  [] Comments:    ADL Training:  [] (11701) Provided self-care/home management training related to activities of daily living and compensatory training, and/or use of adaptive equipment   [] Comments:     Splinting:  [] Fabrication of:   [] (63085) Orthotic/Prosthetic Management, subsequent encounter  [] (20285) Orthotic management and training (fitting and assessment)  [] Comments:    Modalities:     Charges:  Timed Code Treatment Minutes: 45   Total Treatment Minutes: 45     [] EVAL (LOW) 09168   [] OT Re-eval (72798)  [] EVAL (MOD) 67569   [] EVAL (HIGH) 80824       [x] Mariam (95635) x   2 [] BWFZF(92667)  [] NMR (14729) x     [] Estim (attended) (66378)   [] Manual ([id number]) x     [] US (67771)  [x] TA (56162) x  1   [] Paraffin (89655)  [] ADL  (88 649 24 60) x    [] Splint/L code:    [] Estim (unattended) (22 014118)  [] Fluidotherapy (77952)  [] Other:     GOALS:  1) Patient stated goal: To gain strength and activity tolerance needed to return back home independently. []? Progressing: []? Met: []? Not Met: [x]? Adjusted   9/21/20: Pt has sold her home to live with her parents indefinitely due to continued medical issues and nutrition- goal ceased at this time. 2) Patient stated goal: To complete bathing in stance with no use of shower chair  []? Progressing: [x]? Met: []? Not Met: []? Adjusted   9/21/20: near complete; pt required shower chair for final few minutes of most recent shower     Therapist goals for Patient:   Short Term Goals: To be achieved in: 30 days  1. Pt will increase  strength in B hands by 10 lbs by 9/14 to increase functional capacity for ADL/IADL tasks  []? Progressing: [x]? Met: []? Not Met: []? Adjusted  2. Pt will demo increased activity tolerance and safety needed to complete cooking task at independent level by 9/14  []? Progressing: [x]? Met: []? Not Met: []? Adjusted  3.  Pt will demo increase shoulder strength as evidence by ability to complete cleaning task with use of broom or vacuum by 9/14  []? Progressing: [x]? Met: []? Not Met: []? Adjusted     Long Term Goals: To be achieved by discharge  1. Pt will report a QuickDASH Symptom Severity Scale score of 30% or less indicating increased safety and functional independence in desired occupational pursuits by discharge. []? Progressing: [x]? Met: []? Not Met: []? Adjusted    Progression Towards Functional goals:  [] Patient is progressing as expected towards functional goals listed. [] Progression is slowed due to complexities listed. [] Progression has been slowed due to co-morbidities.   [] Plan just implemented, too soon to assess goals progression  [x] All goals are met  [] Other:     ASSESSMENT:  See eval    Treatment/Activity Tolerance:  [x] Patient tolerated treatment well [] Patient limited by fatigue  [] Patient limited by pain  [] Patient limited by other medical complications  [] Other:     Prognosis: [x] Good [] Fair  [] Poor    Patient Requires Follow-up: [] Yes  [x] No    PLAN: See eval  [] Continue per plan of care [] Alter current plan (see comments)  [] Plan of care initiated [] Hold pending MD visit [x] Discharge    Electronically signed by:      Rios Marie OTR/L [id number]

## 2020-12-02 NOTE — DISCHARGE SUMMARY
[facility]  Phone: [phone]   Fax: [phone]   Physical Therapy Discharge Summary    Dear   Dr. Kelly Garza,     We had the pleasure of treating the following patient for physical therapy services at [facility]. A summary of our findings can be found in the discharge summary below. If you have any questions or concerns regarding these findings, please do not hesitate to contact me at the office phone number checked above. Thank you for the referral.     Physician Signature:________________________________Date:__________________  By signing above (or electronic signature), therapists plan is approved by physician      Functional Outcome: 6 MWT improved to 1540 ft    Overall Response to Treatment:   []Patient is responding well to treatment and improvement is noted with regards  to goals   []Patient should continue to improve in reasonable time if they continue HEP   []Patient has plateaued and is no longer responding to skilled PT intervention    []Patient is getting worse and would benefit from return to referring MD   []Patient unable to adhere to initial POC   [x]Other: Pt with considerable overall improvement since beginning therapy with strength and stamina. Pt has used all insurance visits for the year, but will plan on keeping up with HEP as well as using 30 day health plex pass. Date range of Visits: 20 - 20  Total Visits: 20    Recommendation:    [x] Discharge to HEP. Follow up with PT PRN.      Physical Therapy Daily Treatment Note  Date:  2020    Patient Name:  Mahendra Shafer    :  1985  MRN: [id number]  Medical/Treatment Diagnosis Information:  · Diagnosis: physical deconditioning  Treatment Diagnosis: decreased B UE and LE stretch, poor endurance, impaired posture, increased muscle tension through R cervicothoracic region, spinal malalignment  Insurance/Certification information:  PT Insurance Information: [facility] ($15 copay, 20 v per year no estim)  Physician Information:  Referring Practitioner:  - Pt provided information for surgeon who is overseeing all care now:  Dr. Yanet Michelle  [address]  Phone: [phone]  Fax: [phone]  Plan of care signed (Y/N): []  Yes [x]  No     Date of Patient follow up with Physician: ?     Progress Report: [x]  Yes  []  No     Date Range for reporting period:  Beginnin2020  Endin20    Progress report due (10 Rx/or 30 days whichever is less): visit #92      Recertification due (POC duration/ or 90 days whichever is less): 2020     Visit # Insurance Allowable Auth required? Date Range     + 20 []  Yes  [x]  No n/a     Latex Allergy:  [x]NO      []YES  Preferred Language for Healthcare:   [x]English       []other:    Functional Scale:        Date assessed:  30 sec STS: raw score 5 reps                                           2020    30 sec STS: raw score 9 reps                                           2020    Pain level: /10 abdominal and neck pain     SUBJECTIVE: Pt reports she is ready for DC.       OBJECTIVE:   :  Strength (0-5) Left Right   Shoulder flexion 4 4   Shoulder abd 4- 4*   Shoulder ER 4 4+   Shoulder IR 4 4        Seated hip flexion 4- 4-   Seated hip ER 4- 4-   Seated hip IR 4- 4-   Knee flexion 4 4   Knee ext 4 4   Ankle DF 4+ 4+            * = painful         Orthopaedic Special Tests  Positive  Negative  NT Comments    30 sec STS        9 reps   sahrmann level 0.5       Able to complete    sahrmann level 1a       Unable to keep back on table    6 MWT    1166 ft / 355 m       :   Strength (0-5) Left Right   Shoulder flexion 4+ 4+   Shoulder abd 4+ 4+   Shoulder ER 4+ 4+   Shoulder IR 4 4        Seated hip flexion 4+ 4+   Seated hip ER 4+ 4+   Seated hip IR 4- 4-   Knee flexion 4 4 Knee ext 5 5   Ankle DF 5 5             Orthopaedic Special Tests  Comments    30 sec STS  9 reps - goal met at previous PN   6 MWT 1540 ft (469 m)       RESTRICTIONS/PRECAUTIONS: lifting restriction of 8#, frail, J peg tubing     Exercises/Interventions:    Therapeutic Exercises (69164) Resistance / level Sets/sec Reps Notes   Nustep  Step one   Bike    TM forward 2mphX 3 min   IB  HR/TR    LAQ  Seated march    sarhamnn lower ab strength          deadbug position iso holds  SB lift with knees flexed    Prone press up (mini due to tube)  Leg ext in prone  Prone HS curls  Prone ext with knee bent       Mat therex:  · Supine SLR  · SAQ  · Hip Add squeezes  · Hooklying clamshells   · Supine shoulder ER stretch         Supine with 2 pillows          Done after CT   Seated therex:  · Rows with TB loop  · B ER with TB loop    Seated on green SB:  Mid rows  LPD  High rows  Ant/post tilts  Lateral tilts  cw and ccw circles   OH lifts  Trunk twists  Chops      marching   First set with [place] TB   Standing therex:  · Hip 3 way  · FWD step ups  · Lateral step ups  · Ham curl  · Squats  · High knee march  · Hip abd   · Lateral band steps   May need orange TB at home   No UE assist, done in // bars   No UE assist, done in // bars  B UE support  B UE support  B UE support   B UE support, small distance to stay close to bag   Gliders - retro     Gliders - lateral     Free weights:  Bicep curl  Hammer curl  Palm down curl   Lat raises   OH press   Tricep kick back 9/9:  Done Standing    SB rolls on wall   SB rainbows on wall  Small lean fwd for incr stretch   Cables:  Mid rows   LPD  High to low chop  High rows  Hip ext   Hip abd   Hip flexion  High to low chop   abdominal rot          Sidebending stretch with swiss ball  Discontinued - caused pressure in side of abdomen    Swiss ball extension stretch     TRX:  · Rows  · Squats  · Y's  · Lunges      [facility]  Chest press  LPD  Shoulder press  TRX rows  TRX Ys  Free weight cervical: cervical, scapular, scapulothoracic and UE control with self care, reaching, carrying, lifting, house/yardwork, driving, computer work. NMR and Therapeutic Activities:    [x] (38974 or 83717) Provided verbal/tactile cueing for activities related to improving balance, coordination, kinesthetic sense, posture, motor skill, proprioception and motor activation to allow for proper function of   [x] LE: / Lumbar core, proximal hip and LE with self care and ADLs  [] UE / Cervical: cervical, postural, scapular, scapulothoracic and UE control with self care, carrying, lifting, driving, computer work.   [] (26751) Gait Re-education- Provided training and instruction to the patient for proper LE, core and proximal hip recruitment and positioning and eccentric body weight control with ambulation re-education including up and down stairs     Home Management Training / Self Care:  [] (52791) Provided self-care/home management training related to activities of daily living and compensatory training, and/or use of adaptive equipment for improvement with: ADLs and compensatory training, meal preparation, safety procedures and instruction in use of adaptive equipment, including bathing, grooming, dressing, personal hygiene, basic household cleaning and chores.      Home Exercise Program:    [] (35089) Reviewed/Progressed HEP activities related to strengthening, flexibility, endurance, ROM of   [] LE / Lumbar: core, proximal hip and LE for functional self-care, mobility, lifting and ambulation/stair navigation   [] UE / Cervical: cervical, postural, scapular, scapulothoracic and UE control with self care, reaching, carrying, lifting, house/yardwork, driving, computer work  [] (00966)Reviewed/Progressed HEP activities related to improving balance, coordination, kinesthetic sense, posture, motor skill, proprioception of   [] LE: core, proximal hip and LE for self care, mobility, lifting, and ambulation/stair navigation [] UE / Cervical: cervical, postural,  scapular, scapulothoracic and UE control with self care, reaching, carrying, lifting, house/yardwork, driving, computer work    Manual Treatments:  PROM / STM / Oscillations-Mobs:  G-I, II, III, IV (PA's, Inf., Post.)  [] (81761) Provided manual therapy to mobilize LE, proximal hip and/or LS spine soft tissue/joints for the purpose of modulating pain, promoting relaxation,  increasing ROM, reducing/eliminating soft tissue swelling/inflammation/restriction, improving soft tissue extensibility and allowing for proper ROM for normal function with   [] LE / lumbar: self care, mobility, lifting and ambulation. [] UE / Cervical: self care, reaching, carrying, lifting, house/yardwork, driving, computer work. Modalities:  [] (71604) Vasopneumatic compression: Utilized vasopneumatic compression to decrease edema / swelling for the purpose of improving mobility and quad tone / recruitment which will allow for increased overall function including but not limited to self-care, transfers, ambulation, and ascending / descending stairs. Modalities:    9/3, 8/28, 9/1: MHP to cerv spine x 10 min in supine   9/23: MHP to B shoulders and cervical spine in supine x 15 min (between PT and OT sessions)    Charges:  Timed Code Treatment Minutes: 25   Total Treatment Minutes: 25     [] EVAL - LOW (52676) x   [] EVAL - MOD (34541)  [] EVAL - HIGH (73094)  [] RE-EVAL (68242)  [x] XG(90429) x 2       [] Ionto  [] NMR (31615) x       [] Vaso  [] Manual (02015) x        [] Ultrasound  [] TA x        [] Mech Traction (61788)  [] Aquatic Therapy x     [] ES (un) (85291):   [] Home Management Training x  [] ES(attended) (52059)   [] Dry Needling 1-2 muscles (55937):  [] Dry Needling 3+ muscles (678878)  [] Group:      [] Other:     GOALS:  Patient stated goal: gain strength and endurance     Therapist goals for Patient:   Short Term Goals: To be achieved in: 2 weeks  1.  Independent in HEP and progression per patient tolerance, in order to prevent re-injury. 2. Patient will have a decrease in pain to facilitate improvement in movement, function, and ADLs as indicated by Functional Deficits. Long Term Goals: To be achieved in: 6 weeks  1. Pt will increase 30 sec STS reps to at least 8 to demo improvement in endurance. GOAL MET 9/16  2. Patient will increase strength in B UEs to at least 4+/5 and B LEs to at least 4/5 so patient can stand, walk, and complete ADLs independently. Partially Met   3. Patient will demonstrate an increase in postural awareness and control to allow for proper functional mobility as indicated by patients Functional Deficits. GOAL MET 12/2   4. Patient will be able to amb for 300 feet or greater without sitting rest break to progress towards PLOF. GOAL MET 9/16  5. Patient will return to functional activities including cooking without increased symptoms or restriction. GOAL MET - Pt could cook a meal if needed, but typically eats with parents   6. Patient will improve 6MWT distance to at least 430 m (1400 ft) to demo improved endurance for community ambulation. GOAL MET 12/2     Overall Progression Towards Functional goals/ Treatment Progress Update:  [x] Patient is progressing as expected towards functional goals listed. [] Progression is slowed due to complexities/Impairments listed. [] Progression has been slowed due to co-morbidities.   [] Plan just implemented, too soon to assess goals progression <30days   [] Goals require adjustment due to lack of progress  [] Patient is not progressing as expected and requires additional follow up with physician  [] Other    Persisting Functional Limitations/Impairments:  []Sleeping []Sitting               [x]Standing []Transfers        [x]Walking [x]Kneeling               [x]Stairs [x]Squatting / bending   [x]ADLs [x]Reaching  [x]Lifting  [x]Housework  [x]Driving [x]Job related tasks  []Sports/Recreation []Other:        ASSESSMENT: Pt discharged. Treatment/Activity Tolerance:  [] Patient able to complete tx  [x] Patient limited by fatigue  [] Patient limited by pain  [x] Patient limited by other medical complications  [] Other:     Prognosis: [] Good [x] Fair  [] Poor    Patient Requires Follow-up: [] Yes  [x] No    Plan for next treatment session:  Overall strength - UEs, LEs, abdominals as able, endurance training, posture training     PLAN: See rian. PT 2x / week for 6 weeks. [] Continue per plan of care [] Alter current plan (see comments)  [] Plan of care initiated [] Hold pending MD visit [x] Discharge    Electronically signed by: Rudy Lee PT, DPT     Note: If patient does not return for scheduled/ recommended follow up visits, this note will serve as a discharge from care along with most recent update on progress.

## 2020-12-08 ENCOUNTER — APPOINTMENT (OUTPATIENT)
Dept: PHYSICAL THERAPY | Age: 35
End: 2020-12-08
Payer: COMMERCIAL

## 2020-12-08 ENCOUNTER — APPOINTMENT (OUTPATIENT)
Dept: OCCUPATIONAL THERAPY | Age: 35
End: 2020-12-08
Payer: COMMERCIAL

## 2021-11-24 NOTE — PROGRESS NOTES
ENDOSCOPY PREOP INSTRUCTIONS       You are scheduled for a procedure at [facility] on 11-29 @ 1500.  You will need to arrival by: 1330 (at least an hour & a half prior to planned start time)   Report to the MAIN entrance on [address] and register at the information desk on the left-hand side of the lobby   You will need your insurance & photo ID with you. For your procedure:      PLEASE FOLLOW ALL INSTRUCTIONS & PREPS GIVEN TO YOU FROM YOUR DOCTOR'S OFFICE.  If you have not received these instructions yet, please call the office immediately. Make sure to read them as soon as received.  Bring an accurate list of any medications, including the dose/ frequency, with you on the day of the procedure. Make sure to include over the counter medications.  If you are taking blood thinners, Aspirin or diabetic medication, make sure to call your doctor as soon as possible for instructions prior to your procedure.  All patients having anesthesia or sedation are required to be Fully Vaccinated (at least 14 days) prior to procedure - OR - have a Covid PCR test within the 5-6 days prior to the procedure. The results will need to be faxed to PreAdmission Testing at [phone] or Emailed to [email or web address]      Please dress comfortably and do not wear any lotion, powders or jewelry   Arrange for someone to be with you and sign you out & drive you home after your procedure.  We allow 1 visitor with you in the hospital & both of you are required to be masked.  WOMEN ONLY OF CHILDBEARING AGE: Please make sure to be able to give a urine sample on arrival      If you have further questions, you may contact your Endoscopist's office or Pre Admission Testing staff at [phone] [address]. 11/24/2021 .1:32 PM

## 2021-11-26 ENCOUNTER — ANESTHESIA EVENT (OUTPATIENT)
Dept: ENDOSCOPY | Age: 36
End: 2021-11-26
Payer: COMMERCIAL

## 2021-11-29 ENCOUNTER — ANESTHESIA (OUTPATIENT)
Dept: ENDOSCOPY | Age: 36
End: 2021-11-29
Payer: COMMERCIAL

## 2021-11-29 ENCOUNTER — HOSPITAL ENCOUNTER (OUTPATIENT)
Age: 36
Setting detail: OUTPATIENT SURGERY
Discharge: HOME | End: 2021-11-29
Attending: INTERNAL MEDICINE | Admitting: INTERNAL MEDICINE
Payer: COMMERCIAL

## 2021-11-29 VITALS
RESPIRATION RATE: 16 BRPM | HEIGHT: 66 IN | HEART RATE: 65 BPM | DIASTOLIC BLOOD PRESSURE: 72 MMHG | WEIGHT: 130 LBS | TEMPERATURE: 97.2 F | SYSTOLIC BLOOD PRESSURE: 100 MMHG | BODY MASS INDEX: 20.89 KG/M2 | OXYGEN SATURATION: 95 %

## 2021-11-29 VITALS — DIASTOLIC BLOOD PRESSURE: 61 MMHG | SYSTOLIC BLOOD PRESSURE: 99 MMHG | OXYGEN SATURATION: 95 %

## 2021-11-29 LAB
GLUCOSE BLD-MCNC: 167 MG/DL (ref 70–99)
PERFORMED ON: ABNORMAL
PREGNANCY, URINE: NEGATIVE

## 2021-11-29 PROCEDURE — 3609012900 HC EGD FOREIGN BODY REMOVAL: Performed by: INTERNAL MEDICINE

## 2021-11-29 PROCEDURE — 2580000003 HC RX 258: Performed by: ANESTHESIOLOGY

## 2021-11-29 PROCEDURE — 6360000002 HC RX W HCPCS: Performed by: ANESTHESIOLOGY

## 2021-11-29 PROCEDURE — 2580000003 HC RX 258: Performed by: NURSE ANESTHETIST, CERTIFIED REGISTERED

## 2021-11-29 PROCEDURE — 6360000002 HC RX W HCPCS: Performed by: NURSE ANESTHETIST, CERTIFIED REGISTERED

## 2021-11-29 PROCEDURE — 2720000010 HC SURG SUPPLY STERILE: Performed by: INTERNAL MEDICINE

## 2021-11-29 PROCEDURE — 7100000010 HC PHASE II RECOVERY - FIRST 15 MIN: Performed by: INTERNAL MEDICINE

## 2021-11-29 PROCEDURE — 3700000000 HC ANESTHESIA ATTENDED CARE: Performed by: INTERNAL MEDICINE

## 2021-11-29 PROCEDURE — 7100000011 HC PHASE II RECOVERY - ADDTL 15 MIN: Performed by: INTERNAL MEDICINE

## 2021-11-29 PROCEDURE — 2709999900 HC NON-CHARGEABLE SUPPLY: Performed by: INTERNAL MEDICINE

## 2021-11-29 PROCEDURE — 3700000001 HC ADD 15 MINUTES (ANESTHESIA): Performed by: INTERNAL MEDICINE

## 2021-11-29 PROCEDURE — 84703 CHORIONIC GONADOTROPIN ASSAY: CPT

## 2021-11-29 RX ORDER — LINACLOTIDE 290 UG/1
290 CAPSULE, GELATIN COATED ORAL
COMMUNITY

## 2021-11-29 RX ORDER — SODIUM CHLORIDE 0.9 % (FLUSH) 0.9 %
5-40 SYRINGE (ML) INJECTION EVERY 12 HOURS SCHEDULED
Status: DISCONTINUED | OUTPATIENT
Start: 2021-11-29 | End: 2021-11-29 | Stop reason: HOSPADM

## 2021-11-29 RX ORDER — SODIUM CHLORIDE 0.9 % (FLUSH) 0.9 %
5-40 SYRINGE (ML) INJECTION PRN
Status: DISCONTINUED | OUTPATIENT
Start: 2021-11-29 | End: 2021-11-29 | Stop reason: HOSPADM

## 2021-11-29 RX ORDER — PROPOFOL 10 MG/ML
INJECTION, EMULSION INTRAVENOUS PRN
Status: DISCONTINUED | OUTPATIENT
Start: 2021-11-29 | End: 2021-11-29 | Stop reason: SDUPTHER

## 2021-11-29 RX ORDER — SODIUM CHLORIDE, SODIUM LACTATE, POTASSIUM CHLORIDE, CALCIUM CHLORIDE 600; 310; 30; 20 MG/100ML; MG/100ML; MG/100ML; MG/100ML
INJECTION, SOLUTION INTRAVENOUS CONTINUOUS
Status: DISCONTINUED | OUTPATIENT
Start: 2021-11-29 | End: 2021-11-29 | Stop reason: HOSPADM

## 2021-11-29 RX ORDER — MIDODRINE HYDROCHLORIDE 2.5 MG/1
2.5 TABLET ORAL 2 TIMES DAILY
COMMUNITY

## 2021-11-29 RX ORDER — HEPARIN SODIUM (PORCINE) LOCK FLUSH IV SOLN 100 UNIT/ML 100 UNIT/ML
500 SOLUTION INTRAVENOUS ONCE
Status: COMPLETED | OUTPATIENT
Start: 2021-11-29 | End: 2021-11-29

## 2021-11-29 RX ORDER — LIDOCAINE HYDROCHLORIDE 10 MG/ML
1 INJECTION, SOLUTION EPIDURAL; INFILTRATION; INTRACAUDAL; PERINEURAL
Status: DISCONTINUED | OUTPATIENT
Start: 2021-11-29 | End: 2021-11-29 | Stop reason: HOSPADM

## 2021-11-29 RX ORDER — SODIUM CHLORIDE 9 MG/ML
25 INJECTION, SOLUTION INTRAVENOUS PRN
Status: DISCONTINUED | OUTPATIENT
Start: 2021-11-29 | End: 2021-11-29 | Stop reason: HOSPADM

## 2021-11-29 RX ORDER — SODIUM CHLORIDE, SODIUM LACTATE, POTASSIUM CHLORIDE, CALCIUM CHLORIDE 600; 310; 30; 20 MG/100ML; MG/100ML; MG/100ML; MG/100ML
INJECTION, SOLUTION INTRAVENOUS CONTINUOUS PRN
Status: DISCONTINUED | OUTPATIENT
Start: 2021-11-29 | End: 2021-11-29 | Stop reason: SDUPTHER

## 2021-11-29 RX ORDER — INSULIN GLARGINE 100 [IU]/ML
12 INJECTION, SOLUTION SUBCUTANEOUS NIGHTLY
COMMUNITY

## 2021-11-29 RX ADMIN — SODIUM CHLORIDE, POTASSIUM CHLORIDE, SODIUM LACTATE AND CALCIUM CHLORIDE: 600; 310; 30; 20 INJECTION, SOLUTION INTRAVENOUS at 14:51

## 2021-11-29 RX ADMIN — PROPOFOL 40 MG: 10 INJECTION, EMULSION INTRAVENOUS at 15:44

## 2021-11-29 RX ADMIN — PROPOFOL 40 MG: 10 INJECTION, EMULSION INTRAVENOUS at 15:43

## 2021-11-29 RX ADMIN — PROPOFOL 40 MG: 10 INJECTION, EMULSION INTRAVENOUS at 15:50

## 2021-11-29 RX ADMIN — PROPOFOL 40 MG: 10 INJECTION, EMULSION INTRAVENOUS at 15:45

## 2021-11-29 RX ADMIN — HEPARIN SODIUM (PORCINE) LOCK FLUSH IV SOLN 100 UNIT/ML 500 UNITS: 100 SOLUTION at 16:46

## 2021-11-29 RX ADMIN — SODIUM CHLORIDE, SODIUM LACTATE, POTASSIUM CHLORIDE, AND CALCIUM CHLORIDE: .6; .31; .03; .02 INJECTION, SOLUTION INTRAVENOUS at 15:35

## 2021-11-29 RX ADMIN — PROPOFOL 40 MG: 10 INJECTION, EMULSION INTRAVENOUS at 15:47

## 2021-11-29 ASSESSMENT — PULMONARY FUNCTION TESTS
PIF_VALUE: 0

## 2021-11-29 ASSESSMENT — PAIN - FUNCTIONAL ASSESSMENT: PAIN_FUNCTIONAL_ASSESSMENT: 0-10

## 2021-11-29 ASSESSMENT — PAIN SCALES - GENERAL: PAINLEVEL_OUTOF10: 0

## 2021-11-29 ASSESSMENT — PAIN DESCRIPTION - DESCRIPTORS: DESCRIPTORS: SHARP;DULL;ACHING

## 2021-11-29 ASSESSMENT — LIFESTYLE VARIABLES: SMOKING_STATUS: 0

## 2021-11-29 NOTE — ANESTHESIA PRE PROCEDURE
Department of Anesthesiology  Preprocedure Note       Name:  Cassandra Nguyen   Age:  39 y.o.  :  1985                                          MRN:  [id number]         Date:  2021      Surgeon: Eloisa Meyer):  Leny Portillo MD    Procedure: Procedure(s):  ESOPHAGOGASTRODUODENOSCOPY WITH PEG TUBE REMOVAL    Medications prior to admission:   Prior to Admission medications    Medication Sig Start Date End Date Taking? Authorizing Provider   spironolactone (ALDACTONE) 25 MG tablet Take 25 mg by mouth daily    Historical Provider, MD   furosemide (LASIX) 40 MG tablet Take 40 mg by mouth daily Indications: 40-80mg daily    Historical Provider, MD   acetaminophen (TYLENOL) 500 MG tablet Take 500 mg by mouth every 6 hours as needed for Pain    Historical Provider, MD   promethazine (PHENERGAN) 25 MG tablet Take 12.5 mg by mouth every 6 hours as needed     Historical Provider, MD   polyethylene glycol (GLYCOLAX) packet Take 150 g by mouth daily Pt normally takes 6 bags of 17 g every night. Historical Provider, MD   lamoTRIgine (LAMICTAL) 25 MG tablet Take 75 mg by mouth 2 times daily Indications: 3 pills BID     Historical Provider, MD   methadone (DOLOPHINE) 10 MG tablet Take 10 mg by mouth 2 times daily . Historical Provider, MD   Pancrelipase, Lip-Prot-Amyl, (CREON) 15910 UNITS CPEP Take 1 tablet by mouth 3 times daily Takes 1 tablets with each meal    Historical Provider, MD   ondansetron (ZOFRAN) 4 MG tablet Take 4 mg by mouth every 6 hours as needed for Nausea or Vomiting    Historical Provider, MD   insulin aspart (NOVOLOG) 100 UNIT/ML injection Inject  into the skin.  1 unit for every 50 of sugar above 150 qac  Patient taking differently: Inject into the skin Sliding scale 10/25/10   Chirag Lopez MD       Current medications:    Current Facility-Administered Medications   Medication Dose Route Frequency Provider Last Rate Last Admin    lactated ringers infusion   IntraVENous Continuous Chetna Hernandez MD        sodium chloride flush 0.9 % injection 5-40 mL  5-40 mL IntraVENous 2 times per day Chetna Hernandez MD        sodium chloride flush 0.9 % injection 5-40 mL  5-40 mL IntraVENous PRN Chetna Hernandez MD        0.9 % sodium chloride infusion  25 mL IntraVENous PRN Chetna Hernandez MD        lidocaine PF 1 % injection 1 mL  1 mL IntraDERmal Once PRN Chetna Hernandez MD           Allergies:     Allergies   Allergen Reactions    Adhesive Tape Dermatitis    Iron Glycinate [Iron]      Iron infusion cause chest tightness and low back pain    Pepcid [Famotidine] Itching       Problem List:    Patient Active Problem List   Diagnosis Code    Uncontrolled secondary diabetes mellitus (HCC) E13.65    Fatigue R53.83    Generalized edema R60.1    Family history of premature CAD Z82.49    Abnormality of albumin R77.0    Abdominal pain, left upper quadrant R10.12    Ureterolithiasis N20.1    Transaminitis R74.01    Controlled diabetes mellitus type 1 without complications (HCC) U73.3    Hypoxemia R09.02    Poor venous access I87.8    Moderate protein-calorie malnutrition (HCC) E44.0    Nausea R11.0    Severe malnutrition (HCC) E43    SBO (small bowel obstruction) ([facility] K56.609    Colitis K52.9       Past Medical History:        Diagnosis Date    Abdominal pain     with nausea and weight loss    Amenorrhea     Carbapenem-resistant bacterial infection 11/08/2017    urine    Chronic pancreatitis (HCC)     R/T CFTR MUTATION    Diabetes mellitus ([facility] 2006    since pancreas removed    Digestive disorder     Gastroparesis     Hepatic steatosis     History of blood transfusion 04/11/2017    MARIANA (iron deficiency anemia)     Kidney stone     Kidney stone 11/08/2017    PICC (peripherally inserted central catheter) in place     NOT CURRENT 3-2-18    Seizure ([facility] [address]     ONE EPISODE IN 2011       Past Surgical History:        Procedure Laterality Date    CHOLECYSTECTOMY      COLECTOMY  COLONOSCOPY N/A 7/25/2019    COLONOSCOPY performed by Annemarie Tristan MD at [address] N/A 11/6/2019    [facility]NOSTIC [facility]ell [facility] [address] [facility]  10/31/2017    CYSTOSCOPY, LEFT URETERAL STENT PLACEMENT, LEFT URETEROSCOPY, LEFT RETROGRADE PYELOGRAM    CYSTOSCOPY  11/09/2017    CYSTOSCOPY, LEFT URETEROSCOPY, LEFT RETROGRADE, STENT REMOVAL    ENTEROSCOPY N/A 11/6/2019    ENTEROSCOPY PUSH DIAGNOSTIC performed by Sly Dowell MD at [address] [facility]  8/06    removal with islet cell transplant    SIGMOIDOSCOPY N/A 4/29/2020    SIGMOIDOSCOPY DIAGNOSTIC FLEXIBLE performed by Annemarie Tristan MD at [address] [facility]      8/06    STOMACH SURGERY  12/06    half of stomach removed    TUNNELED VENOUS PORT PLACEMENT Left 03/09/2018    UPPER GASTROINTESTINAL ENDOSCOPY  11/30/2010    nasojejunostomy tube placement    UPPER GASTROINTESTINAL ENDOSCOPY N/A 7/25/2019    EGD BIOPSY performed by Annemarie Tristan MD at [email or web address] History:    Social History     Tobacco Use    Smoking status: Never Smoker    Smokeless tobacco: Never Used   Substance Use Topics    Alcohol use: No                                Counseling given: Not Answered      Vital Signs (Current): There were no vitals filed for this visit.                                            BP Readings from Last 3 Encounters:   06/04/20 (!) 99/58   05/28/20 (!) 98/59   05/18/20 (!) 91/58       NPO Status: Time of last liquid consumption: 2300                        Time of last solid consumption: 2300                                                      BMI:   Wt Readings from Last 3 Encounters:   06/04/20 125 lb (56.7 kg)   04/29/20 108 lb (49 kg)   11/07/19 130 lb 4.8 oz (59.1 kg)     There is no height or weight on file to calculate BMI.    CBC:   Lab Results   Component Value Date    WBC 10.9 06/04/2020    RBC 2.85 06/04/2020    HGB 8.8 06/04/2020    HCT 26.0 06/04/2020    MCV 91.4 06/04/2020    RDW 17.4 06/04/2020     06/04/2020       CMP:   Lab Results   Component Value Date     06/08/2020    K 5.5 06/08/2020    K 3.6 05/29/2020    CL 96 06/08/2020    CO2 28 06/08/2020    BUN 21 06/08/2020    CREATININE 1.2 06/08/2020    GFRAA >60 06/08/2020    GFRAA >60 02/01/2011    AGRATIO 0.8 05/29/2020    LABGLOM 51 06/08/2020    GLUCOSE 304 06/08/2020    PROT 5.5 06/03/2020    PROT 6.7 02/01/2011    CALCIUM 8.0 06/08/2020    BILITOT 2.6 06/03/2020    ALKPHOS 74 06/03/2020    AST 20 06/03/2020    ALT 11 06/03/2020       POC Tests: No results for input(s): POCGLU, POCNA, POCK, POCCL, POCBUN, POCHEMO, POCHCT in the last 72 hours.     Coags:   Lab Results   Component Value Date    PROTIME 12.4 02/01/2011    INR 1.13 02/01/2011    APTT 33.4 02/01/2011       HCG (If Applicable):   Lab Results   Component Value Date    PREGTESTUR Negative 04/29/2020        ABGs: No results found for: PHART, PO2ART, LYE3EWE, JYA9UKN, BEART, C3XINCVJ     Type & Screen (If Applicable):  No results found for: LABABO, LABRH    Drug/Infectious Status (If Applicable):  No results found for: HIV, HEPCAB    COVID-19 Screening (If Applicable):   Lab Results   Component Value Date    COVID19 Not Detected 05/29/2020           Anesthesia Evaluation  Patient summary reviewed and Nursing notes reviewed no history of anesthetic complications:   Airway: Mallampati: II  TM distance: >3 FB   Neck ROM: full  Mouth opening: > = 3 FB Dental: normal exam         Pulmonary: breath sounds clear to auscultation      (-) not a current smoker                           Cardiovascular:  Exercise tolerance: good (>4 METS),       (-) past MI    NYHA Classification: II    Rhythm: regular  Rate: normal           Beta Blocker:  Not on Beta Blocker         Neuro/Psych:               GI/Hepatic/Renal:        (-) GERD      ROS comment: Had sbo Underwent gastrostomy tube then for removal  Eating via jujuenostomy tube . Endo/Other:    (+) Diabetes (had pancreatectomy / chronic pancreatitis   genetic mutation gene ), . Abdominal:             Vascular: Other Findings:             Anesthesia Plan      MAC     ASA 2       Induction: intravenous. MIPS: Prophylactic antiemetics administered. Anesthetic plan and risks discussed with patient. Plan discussed with CRNA.     Attending anesthesiologist reviewed and agrees with Preprocedure content              Abisai Haq DO   11/29/2021

## 2021-11-29 NOTE — PROGRESS NOTES
Ambulatory Surgery/Procedure Discharge Note    Vitals:    11/29/21 1645   BP: 100/72   Pulse: 65   Resp: 16   Temp: 97.2 °F (36.2 °C)   SpO2: 95%       In: 500 [I.V.:500]  Out: -     Restroom use offered before discharge. Yes    Pain assessment:  none  Pain Level: 0    PAC flushed with brisk blood return and de accessed. G tube dressing clean dry and intact. Patient discharged to home/self care.  Patient discharged via wheel chair by transporter to waiting family/S.O.       11/29/2021 5:07 PM

## 2021-11-29 NOTE — PROCEDURES
be an appropriate candidate for sedation as planned above. Patient was therefore sedated with the medications listed above. Patient was monitored continuously with electrocardiogram tracing, pulse oximetry, blood pressure monitoring, and direct visualization. Under continue IV sedation with close monitoring, endoscope passed in to the  Gastrojejunostomy and in to Jejunum portion  by direct visualization. POST OPERATIVE FINDINGS:   ESOPHAGUS: Diaphragmatic hiatus was 38 cm from incisors and GE junction (upper margin of gastric folds) was at 38 cm from incisors. Squamocolumnar junction was at 38 cm from incisors. Mucosa appeared normal.  No significant esophageal varices noted. Lot of thick secretions noted in esophagus from post nasal drip suctioned. STOMACH:  Partial gastrectomy anatomy with gastrojejunostomy. G tube noted in the distal part of the stomach. Deflated bulb and G tube removed. 2 clips placed at the G tube site to prevent leak. Jejunum: Examined up to 15 cm. Dilated bowel loop but normal-appearing mucosa. The patient was then decompressed. Scope was then withdrawn. Patient tolerated procedure well. DIAGNOSIS:   -  G tube removed and 2 clips placed at G tube site to prevent leak. RECOMMENDATIONS:   1) NPO for 24 hrs   2) Ok to use J tube for nutrition. 3) Call our office if persistent leak at G tube removal site     Gastric or Duodenal ulcer present: no  Biopsy done to rule out Helicobacter pylori infection:  no  JASMIN PATH SPECIMEN SENT:  no  PHOTOGRAPH TAKEN:  yes In MD Reports    COMPLICATIONS DURING PROCEDURE:   None      EBL: None  . DISPOSITION: The patient was sent to the recovery room in stable condition. CC: Primary Care/Referring Physician(s): MARTHA ELDRIDGE  Patient meets criteria for discharge/transfer:  yes  Results and plan discussed with the patient and family in detail.     Signed By:   Stef Morris MD  [id number]  4:03 PM 11/29/2021       \"Please note that some or all of this record was generated using voice recognition software. If there are any questions about the content of this document, please contact the author as some errors of transcription may have occurred. \"

## 2021-11-29 NOTE — H&P
GI Endoscopy Outpatient Procedure H&P    Patient: Noni Arce MRN: [id number]     YOB: 1985  Age: 39 y.o. Sex: female    Unit: [address] [place] [address] ENDOSCOPY Room/Bed: Endo Pool/NONE Location: [address] [place] [address]     Referring  Physician: Gordy Shira is a 39 y.o. female  here for:  EGD with removal of PEG Tube with leaking. PROCEDURE:    Procedure(s):  ESOPHAGOGASTRODUODENOSCOPY WITH PEG TUBE REMOVAL    PRE OPERATIVE DIAGNOSIS:   IRRITATION AROUND PERCUTANEOUS ENDOSCOPIC GASTROSTOMY (PEG) TUBE SITE K94.29    INDICATIONS:   Same as the preop diagnosis. Pt seen and examined. H& P reviewed. History Obtained From:  patient       Review of GI symptoms include:  Heart Burn: no  Constipation:  yes  Diarrhea:  no  Nausea:  yes  Vomiting:  no Abdominal Pain: yes  Loss of Weight: yes  Rectal Bleeding: no  Black Stool: no  Difficulty Swallowing: no     PMH, PSH , Allergies, Medications reviewed.      Past Medical History:   Diagnosis Date    Abdominal pain     with nausea and weight loss    Amenorrhea     Carbapenem-resistant bacterial infection 11/08/2017    urine    Chronic pancreatitis (HCC)     R/T CFTR MUTATION    Diabetes mellitus ([facility] 2006    since pancreas removed    Digestive disorder     Gastroparesis     Hepatic steatosis     History of blood transfusion 04/11/2017    MARIANA (iron deficiency anemia)     Kidney stone     Kidney stone 11/08/2017    PICC (peripherally inserted central catheter) in place     NOT CURRENT 3-2-18    Seiz[facility]     ONE EPISODE IN 2011       Past Surgical History:   Procedure Laterality Date    CHOLECYSTECTOMY      COLECTOMY      COLONOSCOPY N/A 7/25/2019    COLONOSCOPY performed by Geetha Pittman MD at [address] N/A 11/6/2019    COLONOSCOPY DIAGNOSTIC performed by Jackie Ponce MD at [address]  10/31/2017    CYSTOSCOPY, LEFT URETERAL STENT PLACEMENT, LEFT URETEROSCOPY, LEFT RETROGRADE PYELOGRAM    CYSTOSCOPY  11/09/2017    CYSTOSCOPY, LEFT URETEROSCOPY, LEFT RETROGRADE, STENT REMOVAL    ENTEROSCOPY N/A 11/6/2019    ENTEROSCOPY PUSH DIAGNOSTIC performed by Vincenzo Salcido MD at [address] [place] [address]  8/06    removal with islet cell transplant    SIGMOIDOSCOPY N/A 4/29/2020    SIGMOIDOSCOPY DIAGNOSTIC FLEXIBLE performed by Maria Lang MD at [address] [place] [address]      8/06    STOMACH SURGERY  12/06    half of stomach removed    TUNNELED VENOUS PORT PLACEMENT Left 03/09/2018    UPPER GASTROINTESTINAL ENDOSCOPY  11/30/2010    nasojejunostomy tube placement    UPPER GASTROINTESTINAL ENDOSCOPY N/A 7/25/2019    EGD BIOPSY performed by Maria Lang MD at [address] [facility] ENDOSCOPY       Allergies: Adhesive tape, Iron glycinate [iron], and Pepcid [famotidine]   Allergies noted: Yes     Medications reviewed. No current facility-administered medications on file prior to encounter.      Current Outpatient Medications on File Prior to Encounter   Medication Sig Dispense Refill    linaclotide (LINZESS) 290 MCG CAPS capsule Take 290 mcg by mouth every morning (before breakfast)      insulin glargine (LANTUS) 100 UNIT/ML injection vial Inject 12 Units into the skin nightly      midodrine (PROAMATINE) 2.5 MG tablet Take 2.5 mg by mouth 2 times daily      rifaximin (XIFAXAN) 550 MG tablet Take 550 mg by mouth 2 times daily      LACTULOSE PO 15 mg by Gastrostomy Tube route 2 times daily      spironolactone (ALDACTONE) 25 MG tablet Take 25 mg by mouth daily      furosemide (LASIX) 40 MG tablet Take 40 mg by mouth daily Indications: 40-80mg daily      acetaminophen (TYLENOL) 500 MG tablet Take 500 mg by mouth every 6 hours as needed for Pain      promethazine (PHENERGAN) 25 MG tablet Take 12.5 mg by mouth every 6 hours as needed       polyethylene glycol (GLYCOLAX) packet Take 150 g by mouth daily Pt normally takes 6 bags of 17 g every night.  lamoTRIgine (LAMICTAL) 25 MG tablet Take 75 mg by mouth 2 times daily Indications: 3 pills BID       methadone (DOLOPHINE) 10 MG tablet Take 10 mg by mouth 2 times daily .  Pancrelipase, Lip-Prot-Amyl, (CREON) 53502 UNITS CPEP Take 1 tablet by mouth 3 times daily Takes 1 tablets with each meal      insulin aspart (NOVOLOG) 100 UNIT/ML injection Inject  into the skin. 1 unit for every 50 of sugar above 150 qac (Patient taking differently: Inject into the skin Sliding scale) 1 vial 3    ondansetron (ZOFRAN) 4 MG tablet Take 4 mg by mouth every 6 hours as needed for Nausea or Vomiting           Social History:  Social History     Tobacco Use    Smoking status: Never Smoker    Smokeless tobacco: Never Used   Substance Use Topics    Alcohol use: No         Family History:   Family History   Problem Relation Age of Onset    Heart Disease Maternal Grandfather     High Blood Pressure Maternal Grandmother     Cancer Neg Hx     Diabetes Neg Hx     Stroke Neg Hx     Osteoporosis Neg Hx     Thyroid Disease Neg Hx         PHYSICAL EXAM:   Vital Signs:   /85   Pulse 90   Temp 99 °F (37.2 °C) (Temporal)   Resp 16   Ht 5' 6\" (1.676 m)   Wt 130 lb (59 kg)   SpO2 95%   BMI 20.98 kg/m²  I    Vital signs reviewed:Yes  see nurse documentation for details    General appearance:   No Acute distress   Lungs: Good diaphragmatic excursion. No use of accessory muscles of respiration noted. Heart:   RRR on monitor  Abdomen: irritation around PEG site. Soft, nontender, nondistended. Extremities:   Edema : no  Neuro:  No focal deficits. Alert and oriented         ASA Grade:  ASA 3 - Patient with moderate systemic disease with functional limitations   Per Anesthesia  Mallampati Score: II   Per Anesthesia      ASSESSMENT AND PLAN:    1. Patient is a 39 y.o. female with above specified procedure planned   with deep sedation  2.   Procedure options, risks and benefits and alternatives available for the procedure with possible intervention  reviewed with patient. Patient expresses understanding and informed consent obtained prior to the procedure. .    Patient in acceptable condition for procedure:  Yes    Jesu Bautista MD  3:28 PM 11/29/2021

## 2021-11-29 NOTE — PROGRESS NOTES
At 1600 Arrived to  \[facility]\"" post G-tube removal, dressing clean dry and intact. Answers questions appropriately, aware of need to do feedings through Jtube.

## 2021-11-29 NOTE — ANESTHESIA POSTPROCEDURE EVALUATION
Department of Anesthesiology  Postprocedure Note    Patient: Real Patino  MRN: [id number]  YOB: 1985  Date of evaluation: 11/29/2021  Time:  5:59 PM     Procedure Summary     Date: 11/29/21 Room / Location: [facility]    Anesthesia Start: [facility] Anesthesia Stop: 1600    Procedures:       ESOPHAGOGASTRODUODENOSCOPY WITH PEG TUBE REMOVAL (N/A )      EGD ESOPHAGOGASTRODUODENOSCOPY (N/A ) Diagnosis:       Other complications of gastrostomy ([facility]      (IRRITATION AROUND PERCUTANEOUS ENDOSCOPIC GASTROSTOMY (PEG) TUBE SITE K94.29)    Surgeons: Derrell Olmedo MD Responsible Provider: Joyce Alfonso DO    Anesthesia Type: MAC ASA Status: 2          Anesthesia Type: MAC    Barry Phase I: Barry Score: 10    Barry Phase II: Barry Score: 10    Last vitals: Reviewed and per EMR flowsheets.        Anesthesia Post Evaluation    Patient location during evaluation: PACU  Patient participation: complete - patient participated  Level of consciousness: awake and alert  Pain score: 0  Airway patency: patent  Nausea & Vomiting: no nausea and no vomiting  Complications: no  Cardiovascular status: hemodynamically stable  Respiratory status: acceptable  Hydration status: stable

## 2022-08-03 ENCOUNTER — APPOINTMENT (RX ONLY)
Dept: URBAN - METROPOLITAN AREA CLINIC 154 | Facility: CLINIC | Age: 37
Setting detail: DERMATOLOGY
End: 2022-08-03

## 2022-08-03 DIAGNOSIS — Z71.89 OTHER SPECIFIED COUNSELING: ICD-10-CM

## 2022-08-03 DIAGNOSIS — L30.0 NUMMULAR DERMATITIS: ICD-10-CM | Status: INADEQUATELY CONTROLLED

## 2022-08-03 DIAGNOSIS — L81.6 OTHER DISORDERS OF DIMINISHED MELANIN FORMATION: ICD-10-CM | Status: STABLE

## 2022-08-03 PROBLEM — L30.9 DERMATITIS, UNSPECIFIED: Status: ACTIVE | Noted: 2022-08-03

## 2022-08-03 PROCEDURE — ? ADDITIONAL NOTES

## 2022-08-03 PROCEDURE — ? PRESCRIPTION

## 2022-08-03 PROCEDURE — ? DIAGNOSIS COMMENT

## 2022-08-03 PROCEDURE — ? FULL BODY SKIN EXAM - DECLINED

## 2022-08-03 PROCEDURE — 99204 OFFICE O/P NEW MOD 45 MIN: CPT

## 2022-08-03 PROCEDURE — ? COUNSELING

## 2022-08-03 RX ORDER — HYDROCORTISONE 25 MG/G
CREAM TOPICAL
Qty: 30 | Refills: 0 | Status: ERX | COMMUNITY
Start: 2022-08-03

## 2022-08-03 RX ORDER — CLOBETASOL PROPIONATE 0.5 MG/G
CREAM TOPICAL
Qty: 30 | Refills: 0 | Status: ERX | COMMUNITY
Start: 2022-08-03

## 2022-08-03 RX ADMIN — HYDROCORTISONE: 25 CREAM TOPICAL at 00:00

## 2022-08-03 RX ADMIN — CLOBETASOL PROPIONATE: 0.5 CREAM TOPICAL at 00:00

## 2022-08-03 ASSESSMENT — LOCATION SIMPLE DESCRIPTION DERM
LOCATION SIMPLE: RIGHT FOREHEAD
LOCATION SIMPLE: LEFT PRETIBIAL REGION
LOCATION SIMPLE: RIGHT PRETIBIAL REGION
LOCATION SIMPLE: LEFT CALF

## 2022-08-03 ASSESSMENT — LOCATION DETAILED DESCRIPTION DERM
LOCATION DETAILED: LEFT LATERAL DISTAL PRETIBIAL REGION
LOCATION DETAILED: RIGHT DISTAL PRETIBIAL REGION
LOCATION DETAILED: LEFT DISTAL CALF
LOCATION DETAILED: RIGHT FOREHEAD

## 2022-08-03 ASSESSMENT — LOCATION ZONE DERM
LOCATION ZONE: FACE
LOCATION ZONE: LEG

## 2022-08-03 NOTE — PROCEDURE: ADDITIONAL NOTES
Additional Notes: Patient consent was obtained to proceed with the visit and recommended plan of care after discussion of all risks and benefits, including the risks of COVID-19 exposure.
Detail Level: Simple
Render Risk Assessment In Note?: no
Additional Notes: Provider offered to culture or biopsy area, pt deferred at this time. \\n\\nPatient’s mother has hx of PSO. \\n\\nProvider discussed patient using topical prescription first and RTC in 8-12 weeks, if no improvement consider biopsy at that time.
Detail Level: Detailed
Additional Notes: Hx of scaling patch on forehead and hairline. Hypopigmentated patch today. Will rx hydrocortisone 2.5% cream for affected area on forehead for flares.\\nPt denies rash elsewhere or in scalp, nail changes, or joint pains except for tendon pain on wrist.
Additional Notes: Elta MD SPF Face and Elta MD Body

## 2022-08-03 NOTE — PROCEDURE: COUNSELING
Detail Level: Zone
Patient Specific Counseling (Will Not Stick From Patient To Patient): Wash body with Dove for sensitive skin soap in shower, moisturize body after shower and multiple times per day with gentle moisturizing cream like CeraVe cream or Aveeno lotion. Zyrtec one tablet daily.

## 2022-08-03 NOTE — HPI: SKIN LESION (ACTINIC KERATOSES)
Is This A New Presentation, Or A Follow-Up?: Skin Lesion
Additional History: Check poss Actinic Damage vs Hypopigmentation. No Tx.

## 2022-08-03 NOTE — PROCEDURE: DIAGNOSIS COMMENT
Comment: S/p scaly patch (possibly psoriasis) per pt
Detail Level: Simple
Render Risk Assessment In Note?: no

## 2022-08-03 NOTE — HPI: SKIN LESION
Is This A New Presentation, Or A Follow-Up?: Skin Lesions
Additional History: Check poss Eczema. No tx. Patient reports going in the pool five times recently within the last month, she states that the chlorine has been helping lesions.

## 2022-10-05 ENCOUNTER — APPOINTMENT (RX ONLY)
Dept: URBAN - METROPOLITAN AREA CLINIC 154 | Facility: CLINIC | Age: 37
Setting detail: DERMATOLOGY
End: 2022-10-05

## 2022-10-05 DIAGNOSIS — L30.0 NUMMULAR DERMATITIS: ICD-10-CM | Status: IMPROVED

## 2022-10-05 DIAGNOSIS — T07XXXA ABRASION OR FRICTION BURN OF OTHER, MULTIPLE, AND UNSPECIFIED SITES, WITHOUT MENTION OF INFECTION: ICD-10-CM

## 2022-10-05 PROBLEM — L30.9 DERMATITIS, UNSPECIFIED: Status: ACTIVE | Noted: 2022-10-05

## 2022-10-05 PROBLEM — S30.92XA UNSPECIFIED SUPERFICIAL INJURY OF ABDOMINAL WALL, INITIAL ENCOUNTER: Status: ACTIVE | Noted: 2022-10-05

## 2022-10-05 PROCEDURE — ? PRESCRIPTION MEDICATION MANAGEMENT

## 2022-10-05 PROCEDURE — ? COUNSELING

## 2022-10-05 PROCEDURE — 11102 TANGNTL BX SKIN SINGLE LES: CPT

## 2022-10-05 PROCEDURE — 99214 OFFICE O/P EST MOD 30 MIN: CPT | Mod: 25

## 2022-10-05 PROCEDURE — ? PRESCRIPTION

## 2022-10-05 PROCEDURE — ? FULL BODY SKIN EXAM - DECLINED

## 2022-10-05 PROCEDURE — ? ADDITIONAL NOTES

## 2022-10-05 PROCEDURE — ? BIOPSY BY SHAVE METHOD

## 2022-10-05 RX ORDER — COLLAGENASE SANTYL 250 [ARB'U]/G
OINTMENT TOPICAL
Qty: 30 | Refills: 0 | Status: ERX | COMMUNITY
Start: 2022-10-05

## 2022-10-05 RX ADMIN — COLLAGENASE SANTYL: 250 OINTMENT TOPICAL at 00:00

## 2022-10-05 ASSESSMENT — LOCATION ZONE DERM
LOCATION ZONE: FACE
LOCATION ZONE: TRUNK
LOCATION ZONE: LEG

## 2022-10-05 ASSESSMENT — LOCATION DETAILED DESCRIPTION DERM
LOCATION DETAILED: LEFT LATERAL ABDOMEN
LOCATION DETAILED: RIGHT FOREHEAD
LOCATION DETAILED: RIGHT DISTAL PRETIBIAL REGION
LOCATION DETAILED: LEFT DISTAL CALF
LOCATION DETAILED: LEFT LATERAL DISTAL PRETIBIAL REGION

## 2022-10-05 ASSESSMENT — LOCATION SIMPLE DESCRIPTION DERM
LOCATION SIMPLE: RIGHT PRETIBIAL REGION
LOCATION SIMPLE: LEFT PRETIBIAL REGION
LOCATION SIMPLE: ABDOMEN
LOCATION SIMPLE: RIGHT FOREHEAD
LOCATION SIMPLE: LEFT CALF

## 2022-10-05 NOTE — PROCEDURE: BIOPSY BY SHAVE METHOD

## 2022-10-05 NOTE — PROCEDURE: COUNSELING
Detail Level: Zone
Patient Specific Counseling (Will Not Stick From Patient To Patient): Wash body with Dove for sensitive skin soap in shower, moisturize body after shower and multiple times per day with gentle moisturizing cream like CeraVe cream or Aveeno lotion. Zyrtec one tablet daily.
Detail Level: Detailed

## 2022-10-05 NOTE — PROCEDURE: PRESCRIPTION MEDICATION MANAGEMENT
Render In Strict Bullet Format?: No
Continue Regimen: clobetasol 0.05 % topical cream \\nQuantity: 30.0 g  Days Supply: 30\\nSig: Apply to affected plaque areas on legs once to twice daily, as needed. Please make sure that your using this two weeks on, one week off. Avoid face and groin.\\n\\nhydrocortisone 2.5 % topical cream \\nQuantity: 30.0 g  Days Supply: 30\\nSig: Apply to affected area on face once daily, as needed. Please make sure that you’re using this two weeks on, one week off. Avoid eyes.
Detail Level: Zone

## 2022-10-05 NOTE — PROCEDURE: ADDITIONAL NOTES
Render Risk Assessment In Note?: no
Detail Level: Detailed
Additional Notes: Pt denies rash elsewhere or in scalp, nail changes, or joint pains except for tendon pain on wrist.
Additional Notes: Patient consent was obtained to proceed with the visit and recommended plan of care after discussion of all risks and benefits, including the risks of COVID-19 exposure.
Detail Level: Simple
Additional Notes: Caused by rubbing from feeding tube.\\nAdvised pt to keep a barrier with gauze of telfa to protect skin from feeding tube.

## 2024-03-28 ENCOUNTER — APPOINTMENT (RX ONLY)
Dept: URBAN - METROPOLITAN AREA CLINIC 154 | Facility: CLINIC | Age: 39
Setting detail: DERMATOLOGY
End: 2024-03-28

## 2024-03-28 DIAGNOSIS — L70.0 ACNE VULGARIS: ICD-10-CM | Status: INADEQUATELY CONTROLLED

## 2024-03-28 PROCEDURE — ? TREATMENT REGIMEN

## 2024-03-28 PROCEDURE — ? COUNSELING

## 2024-03-28 PROCEDURE — 99213 OFFICE O/P EST LOW 20 MIN: CPT

## 2024-03-28 ASSESSMENT — LOCATION DETAILED DESCRIPTION DERM
LOCATION DETAILED: RIGHT INFERIOR MEDIAL FOREHEAD
LOCATION DETAILED: RIGHT INFERIOR CENTRAL MALAR CHEEK
LOCATION DETAILED: LEFT INFERIOR CENTRAL MALAR CHEEK

## 2024-03-28 ASSESSMENT — LOCATION ZONE DERM: LOCATION ZONE: FACE

## 2024-03-28 ASSESSMENT — LOCATION SIMPLE DESCRIPTION DERM
LOCATION SIMPLE: RIGHT FOREHEAD
LOCATION SIMPLE: LEFT CHEEK
LOCATION SIMPLE: RIGHT CHEEK

## 2024-03-28 NOTE — PROCEDURE: COUNSELING
High Dose Vitamin A Pregnancy And Lactation Text: High dose vitamin A therapy is contraindicated during pregnancy and breast feeding.
Tetracycline Counseling: Patient counseled regarding possible photosensitivity and increased risk for sunburn.  Patient instructed to avoid sunlight, if possible.  When exposed to sunlight, patients should wear protective clothing, sunglasses, and sunscreen.  The patient was instructed to call the office immediately if the following severe adverse effects occur:  hearing changes, easy bruising/bleeding, severe headache, or vision changes.  The patient verbalized understanding of the proper use and possible adverse effects of tetracycline.  All of the patient's questions and concerns were addressed. Patient understands to avoid pregnancy while on therapy due to potential birth defects.
Dapsone Counseling: I discussed with the patient the risks of dapsone including but not limited to hemolytic anemia, agranulocytosis, rashes, methemoglobinemia, kidney failure, peripheral neuropathy, headaches, GI upset, and liver toxicity.  Patients who start dapsone require monitoring including baseline LFTs and weekly CBCs for the first month, then every month thereafter.  The patient verbalized understanding of the proper use and possible adverse effects of dapsone.  All of the patient's questions and concerns were addressed.
Topical Retinoid Pregnancy And Lactation Text: This medication is Pregnancy Category C. It is unknown if this medication is excreted in breast milk.
Benzoyl Peroxide Pregnancy And Lactation Text: This medication is Pregnancy Category C. It is unknown if benzoyl peroxide is excreted in breast milk.
Isotretinoin Pregnancy And Lactation Text: This medication is Pregnancy Category X and is considered extremely dangerous during pregnancy. It is unknown if it is excreted in breast milk.
Spironolactone Counseling: Patient advised regarding risks of diarrhea, abdominal pain, hyperkalemia, birth defects (for female patients), liver toxicity and renal toxicity. The patient may need blood work to monitor liver and kidney function and potassium levels while on therapy. The patient verbalized understanding of the proper use and possible adverse effects of spironolactone.  All of the patient's questions and concerns were addressed.
Winlevi Counseling:  I discussed with the patient the risks of topical clascoterone including but not limited to erythema, scaling, itching, and stinging. Patient voiced their understanding.
Topical Sulfur Applications Counseling: Topical Sulfur Counseling: Patient counseled that this medication may cause skin irritation or allergic reactions.  In the event of skin irritation, the patient was advised to reduce the amount of the drug applied or use it less frequently.   The patient verbalized understanding of the proper use and possible adverse effects of topical sulfur application.  All of the patient's questions and concerns were addressed.
Birth Control Pills Counseling: Birth Control Pill Counseling: I discussed with the patient the potential side effects of OCPs including but not limited to increased risk of stroke, heart attack, thrombophlebitis, deep venous thrombosis, hepatic adenomas, breast changes, GI upset, headaches, and depression.  The patient verbalized understanding of the proper use and possible adverse effects of OCPs. All of the patient's questions and concerns were addressed.
Erythromycin Pregnancy And Lactation Text: This medication is Pregnancy Category B and is considered safe during pregnancy. It is also excreted in breast milk.
Sarecycline Counseling: Patient advised regarding possible photosensitivity and discoloration of the teeth, skin, lips, tongue and gums.  Patient instructed to avoid sunlight, if possible.  When exposed to sunlight, patients should wear protective clothing, sunglasses, and sunscreen.  The patient was instructed to call the office immediately if the following severe adverse effects occur:  hearing changes, easy bruising/bleeding, severe headache, or vision changes.  The patient verbalized understanding of the proper use and possible adverse effects of sarecycline.  All of the patient's questions and concerns were addressed.
Bactrim Counseling:  I discussed with the patient the risks of sulfa antibiotics including but not limited to GI upset, allergic reaction, drug rash, diarrhea, dizziness, photosensitivity, and yeast infections.  Rarely, more serious reactions can occur including but not limited to aplastic anemia, agranulocytosis, methemoglobinemia, blood dyscrasias, liver or kidney failure, lung infiltrates or desquamative/blistering drug rashes.
Azelaic Acid Pregnancy And Lactation Text: This medication is considered safe during pregnancy and breast feeding.
Topical Clindamycin Counseling: Patient counseled that this medication may cause skin irritation or allergic reactions.  In the event of skin irritation, the patient was advised to reduce the amount of the drug applied or use it less frequently.   The patient verbalized understanding of the proper use and possible adverse effects of clindamycin.  All of the patient's questions and concerns were addressed.
Aklief Pregnancy And Lactation Text: It is unknown if this medication is safe to use during pregnancy.  It is unknown if this medication is excreted in breast milk.  Breastfeeding women should use the topical cream on the smallest area of the skin for the shortest time needed while breastfeeding.  Do not apply to nipple and areola.
Doxycycline Pregnancy And Lactation Text: This medication is Pregnancy Category D and not consider safe during pregnancy. It is also excreted in breast milk but is considered safe for shorter treatment courses.
Minocycline Counseling: Patient advised regarding possible photosensitivity and discoloration of the teeth, skin, lips, tongue and gums.  Patient instructed to avoid sunlight, if possible.  When exposed to sunlight, patients should wear protective clothing, sunglasses, and sunscreen.  The patient was instructed to call the office immediately if the following severe adverse effects occur:  hearing changes, easy bruising/bleeding, severe headache, or vision changes.  The patient verbalized understanding of the proper use and possible adverse effects of minocycline.  All of the patient's questions and concerns were addressed.
Tetracycline Pregnancy And Lactation Text: This medication is Pregnancy Category D and not consider safe during pregnancy. It is also excreted in breast milk.
Dapsone Pregnancy And Lactation Text: This medication is Pregnancy Category C and is not considered safe during pregnancy or breast feeding.
Detail Level: Detailed
Tazorac Counseling:  Patient advised that medication is irritating and drying.  Patient may need to apply sparingly and wash off after an hour before eventually leaving it on overnight.  The patient verbalized understanding of the proper use and possible adverse effects of tazorac.  All of the patient's questions and concerns were addressed.
High Dose Vitamin A Counseling: Side effects reviewed, pt to contact office should one occur.
Azithromycin Counseling:  I discussed with the patient the risks of azithromycin including but not limited to GI upset, allergic reaction, drug rash, diarrhea, and yeast infections.
Winlevi Pregnancy And Lactation Text: This medication is considered safe during pregnancy and breastfeeding.
Spironolactone Pregnancy And Lactation Text: This medication can cause feminization of the male fetus and should be avoided during pregnancy. The active metabolite is also found in breast milk.
Topical Retinoid counseling:  Patient advised to apply a pea-sized amount only at bedtime and wait 30 minutes after washing their face before applying.  If too drying, patient may add a non-comedogenic moisturizer. The patient verbalized understanding of the proper use and possible adverse effects of retinoids.  All of the patient's questions and concerns were addressed.
Use Enhanced Medication Counseling?: No
Isotretinoin Counseling: Patient should get monthly blood tests, not donate blood, not drive at night if vision affected, not share medication, and not undergo elective surgery for 6 months after tx completed. Side effects reviewed, pt to contact office should one occur.
Birth Control Pills Pregnancy And Lactation Text: This medication should be avoided if pregnant and for the first 30 days post-partum.
Benzoyl Peroxide Counseling: Patient counseled that medicine may cause skin irritation and bleach clothing.  In the event of skin irritation, the patient was advised to reduce the amount of the drug applied or use it less frequently.   The patient verbalized understanding of the proper use and possible adverse effects of benzoyl peroxide.  All of the patient's questions and concerns were addressed.
Bactrim Pregnancy And Lactation Text: This medication is Pregnancy Category D and is known to cause fetal risk.  It is also excreted in breast milk.
Topical Sulfur Applications Pregnancy And Lactation Text: This medication is Pregnancy Category C and has an unknown safety profile during pregnancy. It is unknown if this topical medication is excreted in breast milk.
Azelaic Acid Counseling: Patient counseled that medicine may cause skin irritation and to avoid applying near the eyes.  In the event of skin irritation, the patient was advised to reduce the amount of the drug applied or use it less frequently.   The patient verbalized understanding of the proper use and possible adverse effects of azelaic acid.  All of the patient's questions and concerns were addressed.
Erythromycin Counseling:  I discussed with the patient the risks of erythromycin including but not limited to GI upset, allergic reaction, drug rash, diarrhea, increase in liver enzymes, and yeast infections.
Topical Clindamycin Pregnancy And Lactation Text: This medication is Pregnancy Category B and is considered safe during pregnancy. It is unknown if it is excreted in breast milk.
Tazorac Pregnancy And Lactation Text: This medication is not safe during pregnancy. It is unknown if this medication is excreted in breast milk.
Azithromycin Pregnancy And Lactation Text: This medication is considered safe during pregnancy and is also secreted in breast milk.
Doxycycline Counseling:  Patient counseled regarding possible photosensitivity and increased risk for sunburn.  Patient instructed to avoid sunlight, if possible.  When exposed to sunlight, patients should wear protective clothing, sunglasses, and sunscreen.  The patient was instructed to call the office immediately if the following severe adverse effects occur:  hearing changes, easy bruising/bleeding, severe headache, or vision changes.  The patient verbalized understanding of the proper use and possible adverse effects of doxycycline.  All of the patient's questions and concerns were addressed.
Aklief counseling:  Patient advised to apply a pea-sized amount only at bedtime and wait 30 minutes after washing their face before applying.  If too drying, patient may add a non-comedogenic moisturizer.  The most commonly reported side effects including irritation, redness, scaling, dryness, stinging, burning, itching, and increased risk of sunburn.  The patient verbalized understanding of the proper use and possible adverse effects of retinoids.  All of the patient's questions and concerns were addressed.

## 2024-03-28 NOTE — PROCEDURE: TREATMENT REGIMEN
Detail Level: Simple
Initiate Treatment: Am: wash with salicylic acid cleanser the ordinary and sunscreen daily prior to sun exposure \\nPm: wash with hydrating gentle cleanser (only foaming cleansers if oily) then apply the ordinary glycolic acid toner, lastly apply Differin gel \\nUse all medicated products as skin tolerates

## 2024-05-12 NOTE — FLOWSHEET NOTE
[facility] - Outpatient Occupational Therapy      Occupational Therapy Daily Treatment Note  Date:  2020    Patient: Vilma Juan   : 1985   MRN: [id number]  Referring Physician:  Dr. Tee Gutierres Diagnosis Information:  Dx- physical deconditioning                                         Insurance information:  [facility]; ($15 copay, 20 v per year, no estim)  Date of Injury:  Date of Surgery:       Visit # Insurance Allowable Date Range         Date of Patient follow up with Physician: ?    Progress Note: []  Yes  [x]  No  Next due by: Visit #10      Latex Allergy:  [x]No      []Yes    Pacemaker:  [x] No       [] Yes     Preferred Language for Healthcare:   [x]English       []other:    Pain level:  5/10 constant abdominal pain    SUBJECTIVE:  Pt reports no new information. Functional Disability Index: Quick DASH: raw score = ; dysfunction = 43%    OBJECTIVE: See eval      RESTRICTIONS/PRECAUTIONS: lifting restriction of 8#, frail, J peg tubing      Exercises/Interventions: Treatment focused on increasing strength, endurance, and fine motor strength/control to enhance activity tolerance in ADLs/IADLs. Session initiated with 4 minute warm up on rowing machine for UE strengthening, trunk control, and gross motor coordination with pt demonstrating improved thoracic extension on this date. Pt then sat on therapy ball to hold UEs in 90 degrees abduction while completing alternating knee extension x8 each into alternating hip marches x8 each with emphasis on maintaining stable trunk and min A to CGA required to maintain balance. Pt then completed alternating step ups to cushion mat for balance and stability while grasping 5.5 lbs in both hands. Pt ascended and descended 12 steps x2 during session with brief rest and pt reporting she \"never would have been able to do that a few weeks ago. \" Session concluded with FM task utilizing in-hand manipulation to retrieve and place beads in small holes with pt dropping 5/18 beads on L side and 3/18 beads on R side. Therapeutic Exercises  Resistance / level Sets/sec Reps Notes               Isolated finger flexion   Red digiflex 1 5          Neuromuscular Re-ed / Therapeutic Activities                                                 Manual Intervention                                                     Patient education:  EVAL, POC, HEP, Role of OT, Goals- pt verbalized understanding, pt would benefit from visual aid of HEP in future session      Home Exercise Program:  Pt educated to complete the following bodyweight exercises to increase strength and endurance:  1) shoulder flexion  2) shoulder extension  3) elbow flexion   4) elbow extension  5) shoulder abduction  6) shoulder adduction  8/26/20: scapular retraction/protraction x10, 3-5x/day to increase scapular stability and improve posture  8/28/20: theraputty TE; 10 minutes max 2-4x/day  9/1/20: cat/cow and alternating UE reach with LE lift in quadruped      Therapeutic Exercise and NMR:  [x] (81392) Provided verbal/tactile cueing for activities related to strengthening, flexibility, endurance, ROM  for improvements in scapular, scapulothoracic and UE control with self care, reaching, carrying, lifting, house/yardwork, driving/computer work. [x] (47822) Provided verbal/tactile cueing for activities related to improving balance, coordination, kinesthetic sense, posture, motor skill, proprioception  to assist with  scapular, scapulothoracic and UE control with self care, reaching, carrying, lifting, house/yardwork, driving/computer work.   [] Comments:    Therapeutic Activities:    [x] (70716 or 51156) Provided verbal/tactile cueing for activities related to improving balance, coordination, kinesthetic sense, posture, motor skill, proprioception and motor activation to allow for proper function of scapular, scapulothoracic and UE control with self care, carrying, lifting, pending MD visit [] Discharge    Electronically signed by: Lea Coelho OTR/L [id number]      Note: If patient does not return for scheduled/ recommended follow up visits, this note will serve as a discharge from care along with most recent update on progress. Abdomen soft,  ND, + BS. + ttp in LLQ. No upper abd ttp, no guarding.

## (undated) DEVICE — 60 ML SYRINGE,REGULAR TIP: Brand: MONOJECT

## (undated) DEVICE — BW-412T DISP COMBO CLEANING BRUSH: Brand: SINGLE USE COMBINATION CLEANING BRUSH

## (undated) DEVICE — SOLUTION IV IRRIG WATER 500ML POUR BRL ST 2F7113

## (undated) DEVICE — MOUTHPIECE ENDOSCP L CTRL OPN AND SIDE PORTS DISP

## (undated) DEVICE — GOWN AURORA NONREINF LG: Brand: MEDLINE INDUSTRIES, INC.

## (undated) DEVICE — PROCEDURE KIT ENDOSCP CUST

## (undated) DEVICE — SET VLV 3 PC AWS DISPOSABLE GRDIAN SCOPEVALET

## (undated) DEVICE — CLIPPING DEVICE: Brand: RESOLUTION CLIP

## (undated) DEVICE — FORCEPS BX L240CM WRK CHN 2.8MM STD CAP W/ NDL MIC MESH

## (undated) DEVICE — CANNULA SAMP CO2 AD GRN 7FT CO2 AND 7FT O2 TBNG UNIV CONN